# Patient Record
Sex: FEMALE | Race: WHITE | NOT HISPANIC OR LATINO | Employment: OTHER | URBAN - METROPOLITAN AREA
[De-identification: names, ages, dates, MRNs, and addresses within clinical notes are randomized per-mention and may not be internally consistent; named-entity substitution may affect disease eponyms.]

---

## 2017-10-05 ENCOUNTER — COMMUNICATION - HEALTHEAST (OUTPATIENT)
Dept: FAMILY MEDICINE | Facility: CLINIC | Age: 62
End: 2017-10-05

## 2017-10-23 ENCOUNTER — RECORDS - HEALTHEAST (OUTPATIENT)
Dept: ADMINISTRATIVE | Facility: OTHER | Age: 62
End: 2017-10-23

## 2017-11-07 ENCOUNTER — AMBULATORY - HEALTHEAST (OUTPATIENT)
Dept: FAMILY MEDICINE | Facility: CLINIC | Age: 62
End: 2017-11-07

## 2017-11-07 ENCOUNTER — COMMUNICATION - HEALTHEAST (OUTPATIENT)
Dept: FAMILY MEDICINE | Facility: CLINIC | Age: 62
End: 2017-11-07

## 2017-11-07 DIAGNOSIS — Z12.11 COLON CANCER SCREENING: ICD-10-CM

## 2017-11-11 ENCOUNTER — HOSPITAL ENCOUNTER (OUTPATIENT)
Dept: MAMMOGRAPHY | Facility: HOSPITAL | Age: 62
Discharge: HOME | End: 2017-11-11
Attending: FAMILY MEDICINE

## 2017-11-11 DIAGNOSIS — Z12.31 VISIT FOR SCREENING MAMMOGRAM: ICD-10-CM

## 2018-03-21 ENCOUNTER — COMMUNICATION - HEALTHEAST (OUTPATIENT)
Dept: FAMILY MEDICINE | Facility: CLINIC | Age: 63
End: 2018-03-21

## 2018-05-04 ENCOUNTER — OFFICE VISIT - HEALTHEAST (OUTPATIENT)
Dept: FAMILY MEDICINE | Facility: CLINIC | Age: 63
End: 2018-05-04

## 2018-05-04 DIAGNOSIS — E66.9 OBESITY (BMI 30-39.9): ICD-10-CM

## 2018-05-04 DIAGNOSIS — K21.9 GASTROESOPHAGEAL REFLUX DISEASE WITHOUT ESOPHAGITIS: ICD-10-CM

## 2018-05-04 DIAGNOSIS — Z00.00 ROUTINE GENERAL MEDICAL EXAMINATION AT A HEALTH CARE FACILITY: ICD-10-CM

## 2018-05-04 DIAGNOSIS — I10 BENIGN ESSENTIAL HYPERTENSION: ICD-10-CM

## 2018-05-04 DIAGNOSIS — E78.5 HYPERLIPIDEMIA, UNSPECIFIED HYPERLIPIDEMIA TYPE: ICD-10-CM

## 2018-05-04 DIAGNOSIS — E11.29 TYPE 2 DIABETES MELLITUS WITH MICROALBUMINURIA, WITHOUT LONG-TERM CURRENT USE OF INSULIN (H): ICD-10-CM

## 2018-05-04 DIAGNOSIS — R80.9 TYPE 2 DIABETES MELLITUS WITH MICROALBUMINURIA, WITHOUT LONG-TERM CURRENT USE OF INSULIN (H): ICD-10-CM

## 2018-05-04 DIAGNOSIS — Z12.11 COLON CANCER SCREENING: ICD-10-CM

## 2018-05-04 LAB
ALBUMIN SERPL-MCNC: 3.7 G/DL (ref 3.5–5)
ALP SERPL-CCNC: 114 U/L (ref 45–120)
ALT SERPL W P-5'-P-CCNC: 36 U/L (ref 0–45)
ANION GAP SERPL CALCULATED.3IONS-SCNC: 13 MMOL/L (ref 5–18)
AST SERPL W P-5'-P-CCNC: 30 U/L (ref 0–40)
BILIRUB SERPL-MCNC: 0.6 MG/DL (ref 0–1)
BUN SERPL-MCNC: 12 MG/DL (ref 8–22)
CALCIUM SERPL-MCNC: 9.9 MG/DL (ref 8.5–10.5)
CHLORIDE BLD-SCNC: 99 MMOL/L (ref 98–107)
CHOLEST SERPL-MCNC: 294 MG/DL
CO2 SERPL-SCNC: 27 MMOL/L (ref 22–31)
CREAT SERPL-MCNC: 0.81 MG/DL (ref 0.6–1.1)
ERYTHROCYTE [DISTWIDTH] IN BLOOD BY AUTOMATED COUNT: 10.9 % (ref 11–14.5)
FASTING STATUS PATIENT QL REPORTED: YES
GFR SERPL CREATININE-BSD FRML MDRD: >60 ML/MIN/1.73M2
GLUCOSE BLD-MCNC: 262 MG/DL (ref 70–125)
HBA1C MFR BLD: 10.8 % (ref 3.5–6)
HCT VFR BLD AUTO: 44.8 % (ref 35–47)
HDLC SERPL-MCNC: 50 MG/DL
HGB BLD-MCNC: 15.3 G/DL (ref 12–16)
LDLC SERPL CALC-MCNC: 207 MG/DL
MCH RBC QN AUTO: 32.9 PG (ref 27–34)
MCHC RBC AUTO-ENTMCNC: 34.2 G/DL (ref 32–36)
MCV RBC AUTO: 96 FL (ref 80–100)
PLATELET # BLD AUTO: 205 THOU/UL (ref 140–440)
PMV BLD AUTO: 7.6 FL (ref 7–10)
POTASSIUM BLD-SCNC: 4.5 MMOL/L (ref 3.5–5)
PROT SERPL-MCNC: 7.1 G/DL (ref 6–8)
RBC # BLD AUTO: 4.65 MILL/UL (ref 3.8–5.4)
SODIUM SERPL-SCNC: 139 MMOL/L (ref 136–145)
TRIGL SERPL-MCNC: 187 MG/DL
WBC: 7.9 THOU/UL (ref 4–11)

## 2018-05-04 ASSESSMENT — MIFFLIN-ST. JEOR: SCORE: 1454.86

## 2018-06-19 ENCOUNTER — OFFICE VISIT - HEALTHEAST (OUTPATIENT)
Dept: FAMILY MEDICINE | Facility: CLINIC | Age: 63
End: 2018-06-19

## 2018-06-19 DIAGNOSIS — H81.02 MENIERE'S DISEASE, LEFT: ICD-10-CM

## 2018-06-19 DIAGNOSIS — H66.002 ACUTE SUPPURATIVE OTITIS MEDIA OF LEFT EAR WITHOUT SPONTANEOUS RUPTURE OF TYMPANIC MEMBRANE, RECURRENCE NOT SPECIFIED: ICD-10-CM

## 2018-06-19 DIAGNOSIS — H91.92 HEARING LOSS OF LEFT EAR, UNSPECIFIED HEARING LOSS TYPE: ICD-10-CM

## 2018-06-19 ASSESSMENT — MIFFLIN-ST. JEOR: SCORE: 1430.82

## 2018-06-25 ENCOUNTER — OFFICE VISIT - HEALTHEAST (OUTPATIENT)
Dept: FAMILY MEDICINE | Facility: CLINIC | Age: 63
End: 2018-06-25

## 2018-06-25 DIAGNOSIS — I10 BENIGN ESSENTIAL HYPERTENSION: ICD-10-CM

## 2018-06-25 DIAGNOSIS — H81.09 MENIERE'S DISEASE, UNSPECIFIED LATERALITY: ICD-10-CM

## 2018-06-25 RX ORDER — MECLIZINE HYDROCHLORIDE 25 MG/1
25 TABLET ORAL 3 TIMES DAILY PRN
Qty: 30 TABLET | Refills: 1 | Status: SHIPPED | OUTPATIENT
Start: 2018-06-25 | End: 2022-01-27

## 2018-06-25 ASSESSMENT — MIFFLIN-ST. JEOR: SCORE: 1427.19

## 2018-07-09 ENCOUNTER — AMBULATORY - HEALTHEAST (OUTPATIENT)
Dept: LAB | Facility: CLINIC | Age: 63
End: 2018-07-09

## 2018-07-09 DIAGNOSIS — H81.09 MENIERE'S DISEASE, UNSPECIFIED LATERALITY: ICD-10-CM

## 2018-07-09 DIAGNOSIS — I10 BENIGN ESSENTIAL HYPERTENSION: ICD-10-CM

## 2018-07-09 LAB
ANION GAP SERPL CALCULATED.3IONS-SCNC: 14 MMOL/L (ref 5–18)
CHLORIDE BLD-SCNC: 98 MMOL/L (ref 98–107)
CO2 SERPL-SCNC: 26 MMOL/L (ref 22–31)
CREAT SERPL-MCNC: 0.92 MG/DL (ref 0.6–1.1)
GFR SERPL CREATININE-BSD FRML MDRD: >60 ML/MIN/1.73M2
POTASSIUM BLD-SCNC: 4.9 MMOL/L (ref 3.5–5)
SODIUM SERPL-SCNC: 138 MMOL/L (ref 136–145)

## 2018-07-10 ENCOUNTER — OFFICE VISIT - HEALTHEAST (OUTPATIENT)
Dept: FAMILY MEDICINE | Facility: CLINIC | Age: 63
End: 2018-07-10

## 2018-07-10 DIAGNOSIS — Z23 NEED FOR SHINGLES VACCINE: ICD-10-CM

## 2018-07-10 DIAGNOSIS — R42 VERTIGO: ICD-10-CM

## 2018-07-10 DIAGNOSIS — H81.09 MENIERE'S DISEASE, UNSPECIFIED LATERALITY: ICD-10-CM

## 2018-07-10 ASSESSMENT — MIFFLIN-ST. JEOR: SCORE: 1428.55

## 2018-07-25 ENCOUNTER — OFFICE VISIT - HEALTHEAST (OUTPATIENT)
Dept: AUDIOLOGY | Facility: CLINIC | Age: 63
End: 2018-07-25

## 2018-07-25 ENCOUNTER — OFFICE VISIT - HEALTHEAST (OUTPATIENT)
Dept: OTOLARYNGOLOGY | Facility: CLINIC | Age: 63
End: 2018-07-25

## 2018-07-25 DIAGNOSIS — R42 VERTIGO: ICD-10-CM

## 2018-07-25 DIAGNOSIS — H93.12 TINNITUS OF LEFT EAR: ICD-10-CM

## 2018-07-25 DIAGNOSIS — H91.22 SUDDEN LEFT HEARING LOSS: ICD-10-CM

## 2018-07-25 DIAGNOSIS — R42 DIZZINESS: ICD-10-CM

## 2018-07-25 DIAGNOSIS — H90.3 SENSORINEURAL HEARING LOSS, BILATERAL: ICD-10-CM

## 2018-07-25 RX ORDER — TIMOLOL MALEATE 5 MG/ML
SOLUTION/ DROPS OPHTHALMIC
Refills: 3 | Status: SHIPPED | COMMUNITY
Start: 2018-05-07

## 2018-07-31 ENCOUNTER — OFFICE VISIT - HEALTHEAST (OUTPATIENT)
Dept: AUDIOLOGY | Facility: CLINIC | Age: 63
End: 2018-07-31

## 2018-07-31 DIAGNOSIS — H90.3 SENSORINEURAL HEARING LOSS, BILATERAL: ICD-10-CM

## 2018-07-31 DIAGNOSIS — H93.12 TINNITUS OF LEFT EAR: ICD-10-CM

## 2018-08-01 ENCOUNTER — OFFICE VISIT - HEALTHEAST (OUTPATIENT)
Dept: OTOLARYNGOLOGY | Facility: CLINIC | Age: 63
End: 2018-08-01

## 2018-08-01 DIAGNOSIS — H91.22 SUDDEN HEARING LOSS, LEFT: ICD-10-CM

## 2018-08-09 ENCOUNTER — HOSPITAL ENCOUNTER (OUTPATIENT)
Dept: MRI IMAGING | Facility: HOSPITAL | Age: 63
Discharge: HOME | End: 2018-08-09
Attending: OTOLARYNGOLOGY

## 2018-08-09 DIAGNOSIS — H91.22 SUDDEN HEARING LOSS, LEFT: ICD-10-CM

## 2018-08-15 ENCOUNTER — OFFICE VISIT - HEALTHEAST (OUTPATIENT)
Dept: OTOLARYNGOLOGY | Facility: CLINIC | Age: 63
End: 2018-08-15

## 2018-08-15 DIAGNOSIS — H91.22 SUDDEN HEARING LOSS, LEFT: ICD-10-CM

## 2018-08-30 ENCOUNTER — RECORDS - HEALTHEAST (OUTPATIENT)
Dept: ADMINISTRATIVE | Facility: OTHER | Age: 63
End: 2018-08-30

## 2018-09-05 ENCOUNTER — OFFICE VISIT - HEALTHEAST (OUTPATIENT)
Dept: AUDIOLOGY | Facility: CLINIC | Age: 63
End: 2018-09-05

## 2018-09-05 ENCOUNTER — OFFICE VISIT - HEALTHEAST (OUTPATIENT)
Dept: OTOLARYNGOLOGY | Facility: CLINIC | Age: 63
End: 2018-09-05

## 2018-09-05 DIAGNOSIS — C41.0 CHORDOMA OF CLIVUS (H): ICD-10-CM

## 2018-09-05 DIAGNOSIS — H91.22 SUDDEN HEARING LOSS, LEFT: ICD-10-CM

## 2018-09-05 DIAGNOSIS — H91.22 SUDDEN LEFT HEARING LOSS: ICD-10-CM

## 2018-09-11 ENCOUNTER — COMMUNICATION - HEALTHEAST (OUTPATIENT)
Dept: FAMILY MEDICINE | Facility: CLINIC | Age: 63
End: 2018-09-11

## 2018-09-11 DIAGNOSIS — I10 BENIGN ESSENTIAL HYPERTENSION: ICD-10-CM

## 2018-09-14 ENCOUNTER — COMMUNICATION - HEALTHEAST (OUTPATIENT)
Dept: FAMILY MEDICINE | Facility: CLINIC | Age: 63
End: 2018-09-14

## 2018-09-14 DIAGNOSIS — Z12.11 SCREEN FOR COLON CANCER: ICD-10-CM

## 2018-10-04 ENCOUNTER — COMMUNICATION - HEALTHEAST (OUTPATIENT)
Dept: ADMINISTRATIVE | Facility: CLINIC | Age: 63
End: 2018-10-04

## 2018-10-09 ENCOUNTER — COMMUNICATION - HEALTHEAST (OUTPATIENT)
Dept: FAMILY MEDICINE | Facility: CLINIC | Age: 63
End: 2018-10-09

## 2018-11-02 ENCOUNTER — RECORDS - HEALTHEAST (OUTPATIENT)
Dept: ADMINISTRATIVE | Facility: OTHER | Age: 63
End: 2018-11-02

## 2018-11-06 ENCOUNTER — RECORDS - HEALTHEAST (OUTPATIENT)
Dept: ADMINISTRATIVE | Facility: OTHER | Age: 63
End: 2018-11-06

## 2018-11-16 ENCOUNTER — COMMUNICATION - HEALTHEAST (OUTPATIENT)
Dept: NURSING | Facility: CLINIC | Age: 63
End: 2018-11-16

## 2018-11-20 ENCOUNTER — AMBULATORY - HEALTHEAST (OUTPATIENT)
Dept: NURSING | Facility: CLINIC | Age: 63
End: 2018-11-20

## 2019-04-02 ENCOUNTER — RECORDS - HEALTHEAST (OUTPATIENT)
Dept: HEALTH INFORMATION MANAGEMENT | Facility: CLINIC | Age: 64
End: 2019-04-02

## 2019-05-21 ENCOUNTER — COMMUNICATION - HEALTHEAST (OUTPATIENT)
Dept: FAMILY MEDICINE | Facility: CLINIC | Age: 64
End: 2019-05-21

## 2019-05-21 DIAGNOSIS — I10 BENIGN ESSENTIAL HYPERTENSION: ICD-10-CM

## 2019-05-21 DIAGNOSIS — R80.9 TYPE 2 DIABETES MELLITUS WITH MICROALBUMINURIA, WITHOUT LONG-TERM CURRENT USE OF INSULIN (H): ICD-10-CM

## 2019-05-21 DIAGNOSIS — E11.29 TYPE 2 DIABETES MELLITUS WITH MICROALBUMINURIA, WITHOUT LONG-TERM CURRENT USE OF INSULIN (H): ICD-10-CM

## 2019-05-21 DIAGNOSIS — K21.9 GASTROESOPHAGEAL REFLUX DISEASE WITHOUT ESOPHAGITIS: ICD-10-CM

## 2019-05-23 ENCOUNTER — COMMUNICATION - HEALTHEAST (OUTPATIENT)
Dept: FAMILY MEDICINE | Facility: CLINIC | Age: 64
End: 2019-05-23

## 2019-05-23 DIAGNOSIS — I10 BENIGN ESSENTIAL HYPERTENSION: ICD-10-CM

## 2019-08-13 ENCOUNTER — COMMUNICATION - HEALTHEAST (OUTPATIENT)
Dept: NURSING | Facility: CLINIC | Age: 64
End: 2019-08-13

## 2019-08-13 DIAGNOSIS — K21.9 GASTROESOPHAGEAL REFLUX DISEASE WITHOUT ESOPHAGITIS: ICD-10-CM

## 2019-08-13 DIAGNOSIS — I10 BENIGN ESSENTIAL HYPERTENSION: ICD-10-CM

## 2019-09-09 ENCOUNTER — COMMUNICATION - HEALTHEAST (OUTPATIENT)
Dept: FAMILY MEDICINE | Facility: CLINIC | Age: 64
End: 2019-09-09

## 2019-09-09 DIAGNOSIS — I10 BENIGN ESSENTIAL HYPERTENSION: ICD-10-CM

## 2019-12-06 ENCOUNTER — RECORDS - HEALTHEAST (OUTPATIENT)
Dept: ADMINISTRATIVE | Facility: OTHER | Age: 64
End: 2019-12-06

## 2020-04-22 ENCOUNTER — COMMUNICATION - HEALTHEAST (OUTPATIENT)
Dept: FAMILY MEDICINE | Facility: CLINIC | Age: 65
End: 2020-04-22

## 2020-04-22 ENCOUNTER — COMMUNICATION - HEALTHEAST (OUTPATIENT)
Dept: NURSING | Facility: CLINIC | Age: 65
End: 2020-04-22

## 2020-04-22 DIAGNOSIS — K21.9 GASTROESOPHAGEAL REFLUX DISEASE WITHOUT ESOPHAGITIS: ICD-10-CM

## 2020-04-22 DIAGNOSIS — I10 BENIGN ESSENTIAL HYPERTENSION: ICD-10-CM

## 2020-04-24 ENCOUNTER — COMMUNICATION - HEALTHEAST (OUTPATIENT)
Dept: FAMILY MEDICINE | Facility: CLINIC | Age: 65
End: 2020-04-24

## 2020-04-27 ENCOUNTER — OFFICE VISIT - HEALTHEAST (OUTPATIENT)
Dept: FAMILY MEDICINE | Facility: CLINIC | Age: 65
End: 2020-04-27

## 2020-04-27 DIAGNOSIS — I10 BENIGN ESSENTIAL HYPERTENSION: ICD-10-CM

## 2020-04-27 DIAGNOSIS — R30.0 DYSURIA: ICD-10-CM

## 2020-04-27 DIAGNOSIS — E11.29 TYPE 2 DIABETES MELLITUS WITH MICROALBUMINURIA, WITHOUT LONG-TERM CURRENT USE OF INSULIN (H): ICD-10-CM

## 2020-04-27 DIAGNOSIS — E78.5 HYPERLIPIDEMIA, UNSPECIFIED HYPERLIPIDEMIA TYPE: ICD-10-CM

## 2020-04-27 DIAGNOSIS — R80.9 TYPE 2 DIABETES MELLITUS WITH MICROALBUMINURIA, WITHOUT LONG-TERM CURRENT USE OF INSULIN (H): ICD-10-CM

## 2020-04-27 ASSESSMENT — PATIENT HEALTH QUESTIONNAIRE - PHQ9: SUM OF ALL RESPONSES TO PHQ QUESTIONS 1-9: 0

## 2020-06-09 ENCOUNTER — AMBULATORY - HEALTHEAST (OUTPATIENT)
Dept: LAB | Facility: CLINIC | Age: 65
End: 2020-06-09

## 2020-06-09 ENCOUNTER — COMMUNICATION - HEALTHEAST (OUTPATIENT)
Dept: FAMILY MEDICINE | Facility: CLINIC | Age: 65
End: 2020-06-09

## 2020-06-09 DIAGNOSIS — R80.9 TYPE 2 DIABETES MELLITUS WITH MICROALBUMINURIA, WITHOUT LONG-TERM CURRENT USE OF INSULIN (H): ICD-10-CM

## 2020-06-09 DIAGNOSIS — E11.29 TYPE 2 DIABETES MELLITUS WITH MICROALBUMINURIA, WITHOUT LONG-TERM CURRENT USE OF INSULIN (H): ICD-10-CM

## 2020-06-09 DIAGNOSIS — I10 BENIGN ESSENTIAL HYPERTENSION: ICD-10-CM

## 2020-06-09 DIAGNOSIS — E78.5 HYPERLIPIDEMIA, UNSPECIFIED HYPERLIPIDEMIA TYPE: ICD-10-CM

## 2020-06-09 LAB
ALBUMIN SERPL-MCNC: 3.5 G/DL (ref 3.5–5)
ALP SERPL-CCNC: 91 U/L (ref 45–120)
ALT SERPL W P-5'-P-CCNC: 22 U/L (ref 0–45)
ANION GAP SERPL CALCULATED.3IONS-SCNC: 10 MMOL/L (ref 5–18)
AST SERPL W P-5'-P-CCNC: 20 U/L (ref 0–40)
BILIRUB SERPL-MCNC: 0.6 MG/DL (ref 0–1)
BUN SERPL-MCNC: 11 MG/DL (ref 8–22)
CALCIUM SERPL-MCNC: 9.4 MG/DL (ref 8.5–10.5)
CHLORIDE BLD-SCNC: 104 MMOL/L (ref 98–107)
CHOLEST SERPL-MCNC: 241 MG/DL
CO2 SERPL-SCNC: 27 MMOL/L (ref 22–31)
CREAT SERPL-MCNC: 0.84 MG/DL (ref 0.6–1.1)
CREAT UR-MCNC: 45.7 MG/DL
ERYTHROCYTE [DISTWIDTH] IN BLOOD BY AUTOMATED COUNT: 12.1 % (ref 11–14.5)
FASTING STATUS PATIENT QL REPORTED: YES
GFR SERPL CREATININE-BSD FRML MDRD: >60 ML/MIN/1.73M2
GLUCOSE BLD-MCNC: 182 MG/DL (ref 70–125)
HBA1C MFR BLD: 7 % (ref 3.5–6)
HCT VFR BLD AUTO: 47.3 % (ref 35–47)
HDLC SERPL-MCNC: 46 MG/DL
HGB BLD-MCNC: 16.4 G/DL (ref 12–16)
LDLC SERPL CALC-MCNC: 164 MG/DL
MCH RBC QN AUTO: 33.5 PG (ref 27–34)
MCHC RBC AUTO-ENTMCNC: 34.7 G/DL (ref 32–36)
MCV RBC AUTO: 96 FL (ref 80–100)
MICROALBUMIN UR-MCNC: 2.4 MG/DL (ref 0–1.99)
MICROALBUMIN/CREAT UR: 52.5 MG/G
PLATELET # BLD AUTO: 172 THOU/UL (ref 140–440)
PMV BLD AUTO: 8.7 FL (ref 7–10)
POTASSIUM BLD-SCNC: 4.2 MMOL/L (ref 3.5–5)
PROT SERPL-MCNC: 6.7 G/DL (ref 6–8)
RBC # BLD AUTO: 4.9 MILL/UL (ref 3.8–5.4)
SODIUM SERPL-SCNC: 141 MMOL/L (ref 136–145)
TRIGL SERPL-MCNC: 154 MG/DL
WBC: 6.6 THOU/UL (ref 4–11)

## 2020-06-10 ENCOUNTER — COMMUNICATION - HEALTHEAST (OUTPATIENT)
Dept: FAMILY MEDICINE | Facility: CLINIC | Age: 65
End: 2020-06-10

## 2020-06-10 DIAGNOSIS — K21.9 GASTROESOPHAGEAL REFLUX DISEASE WITHOUT ESOPHAGITIS: ICD-10-CM

## 2020-06-10 DIAGNOSIS — R80.9 TYPE 2 DIABETES MELLITUS WITH MICROALBUMINURIA, WITHOUT LONG-TERM CURRENT USE OF INSULIN (H): ICD-10-CM

## 2020-06-10 DIAGNOSIS — I10 BENIGN ESSENTIAL HYPERTENSION: ICD-10-CM

## 2020-06-10 DIAGNOSIS — E78.5 HYPERLIPIDEMIA, UNSPECIFIED HYPERLIPIDEMIA TYPE: ICD-10-CM

## 2020-06-10 DIAGNOSIS — E11.29 TYPE 2 DIABETES MELLITUS WITH MICROALBUMINURIA, WITHOUT LONG-TERM CURRENT USE OF INSULIN (H): ICD-10-CM

## 2020-08-21 ENCOUNTER — RECORDS - HEALTHEAST (OUTPATIENT)
Dept: ADMINISTRATIVE | Facility: OTHER | Age: 65
End: 2020-08-21

## 2020-08-21 LAB — RETINOPATHY: NEGATIVE

## 2020-09-01 ENCOUNTER — RECORDS - HEALTHEAST (OUTPATIENT)
Dept: HEALTH INFORMATION MANAGEMENT | Facility: CLINIC | Age: 65
End: 2020-09-01

## 2021-03-05 ENCOUNTER — RECORDS - HEALTHEAST (OUTPATIENT)
Dept: ADMINISTRATIVE | Facility: OTHER | Age: 66
End: 2021-03-05

## 2021-03-05 LAB — RETINOPATHY: NEGATIVE

## 2021-03-09 ENCOUNTER — RECORDS - HEALTHEAST (OUTPATIENT)
Dept: HEALTH INFORMATION MANAGEMENT | Facility: CLINIC | Age: 66
End: 2021-03-09

## 2021-03-26 ENCOUNTER — AMBULATORY - HEALTHEAST (OUTPATIENT)
Dept: NURSING | Facility: CLINIC | Age: 66
End: 2021-03-26

## 2021-04-16 ENCOUNTER — AMBULATORY - HEALTHEAST (OUTPATIENT)
Dept: NURSING | Facility: CLINIC | Age: 66
End: 2021-04-16

## 2021-05-24 ENCOUNTER — RECORDS - HEALTHEAST (OUTPATIENT)
Dept: ADMINISTRATIVE | Facility: CLINIC | Age: 66
End: 2021-05-24

## 2021-05-25 ENCOUNTER — RECORDS - HEALTHEAST (OUTPATIENT)
Dept: ADMINISTRATIVE | Facility: CLINIC | Age: 66
End: 2021-05-25

## 2021-05-26 ENCOUNTER — RECORDS - HEALTHEAST (OUTPATIENT)
Dept: ADMINISTRATIVE | Facility: CLINIC | Age: 66
End: 2021-05-26

## 2021-05-27 ENCOUNTER — RECORDS - HEALTHEAST (OUTPATIENT)
Dept: ADMINISTRATIVE | Facility: CLINIC | Age: 66
End: 2021-05-27

## 2021-05-27 ASSESSMENT — PATIENT HEALTH QUESTIONNAIRE - PHQ9: SUM OF ALL RESPONSES TO PHQ QUESTIONS 1-9: 0

## 2021-05-28 ENCOUNTER — RECORDS - HEALTHEAST (OUTPATIENT)
Dept: ADMINISTRATIVE | Facility: CLINIC | Age: 66
End: 2021-05-28

## 2021-05-29 NOTE — TELEPHONE ENCOUNTER
Refill Approved    Rx renewed per Medication Renewal Policy. Medication was last renewed on 5/4/18.    Sheila Young, [facility] Triage/Med Refill 5/22/2019     Requested Prescriptions   Pending Prescriptions Disp Refills     metFORMIN (GLUCOPHAGE) 500 MG tablet [Pharmacy Med Name: METFORMIN HCL TABS 500MG] 180 tablet 3     Sig: TAKE 1 TABLET TWICE A DAY WITH MEALS       Metformin Refill Protocol Failed - 5/21/2019  8:55 AM        Failed - LFT or AST or ALT in last 12 months     Albumin   Date Value Ref Range Status   05/04/2018 3.7 3.5 - 5.0 g/dL Final     Bilirubin, Total   Date Value Ref Range Status   05/04/2018 0.6 0.0 - 1.0 mg/dL Final     Alkaline Phosphatase   Date Value Ref Range Status   05/04/2018 114 45 - 120 U/L Final     AST   Date Value Ref Range Status   05/04/2018 30 0 - 40 U/L Final     ALT   Date Value Ref Range Status   05/04/2018 36 0 - 45 U/L Final     Protein, Total   Date Value Ref Range Status   05/04/2018 7.1 6.0 - 8.0 g/dL Final                Failed - Visit with PCP or prescribing provider visit in last 6 months or next 3 months     Last office visit with prescriber/PCP: Visit date not found OR same dept: 7/10/2018 Yumi Montilla MD OR same specialty: 7/10/2018 Yumi Montilla MD Last physical: Visit date not found Last MTM visit: Visit date not found         Next appt within 3 mo: Visit date not found  Next physical within 3 mo: Visit date not found  Prescriber OR PCP: Cora Cat MD  Last diagnosis associated with med order: 1. Benign essential hypertension  - lisinopril (PRINIVIL,ZESTRIL) 20 MG tablet [Pharmacy Med Name: LISINOPRIL TABS 20MG]; TAKE 1 TABLET DAILY  Dispense: 90 tablet; Refill: 3     If protocol passes may refill for 12 months if within 3 months of last provider visit (or a total of 15 months).           Failed - A1C in last 6 months     Hemoglobin A1c   Date Value Ref Range Status   05/04/2018 10.8 (H) 3.5 - 6.0 % Final               Failed -  Microalbumin in last year      Microalbumin, Random Urine   Date Value Ref Range Status   11/11/2016 22.05 (H) 0.00 - 1.99 mg/dL Final                  Passed - Blood pressure in last 12 months     BP Readings from Last 1 Encounters:   07/10/18 128/68             Passed - GFR or Serum Creatinine in last 6 months     GFR MDRD Non Af Amer   Date Value Ref Range Status   07/09/2018 >60 >60 mL/min/1.73m2 Final     GFR MDRD Af Amer   Date Value Ref Range Status   07/09/2018 >60 >60 mL/min/1.73m2 Final             lisinopril (PRINIVIL,ZESTRIL) 20 MG tablet [Pharmacy Med Name: LISINOPRIL TABS 20MG] 90 tablet 3     Sig: TAKE 1 TABLET DAILY       Ace Inhibitors Refill Protocol Passed - 5/21/2019  8:55 AM        Passed - PCP or prescribing provider visit in past 12 months       Last office visit with prescriber/PCP: 9/25/2015 Cora Cat MD OR same dept: 7/10/2018 Yumi Montilla MD OR same specialty: 7/10/2018 Yumi Montilla MD  Last physical: 5/4/2018 Last MTM visit: Visit date not found   Next visit within 3 mo: Visit date not found  Next physical within 3 mo: Visit date not found  Prescriber OR PCP: Cora Cat MD  Last diagnosis associated with med order: 1. Benign essential hypertension  - lisinopril (PRINIVIL,ZESTRIL) 20 MG tablet [Pharmacy Med Name: LISINOPRIL TABS 20MG]; TAKE 1 TABLET DAILY  Dispense: 90 tablet; Refill: 3    If protocol passes may refill for 12 months if within 3 months of last provider visit (or a total of 15 months).             Passed - Serum Potassium in past 12 months     Lab Results   Component Value Date    Potassium 4.9 07/09/2018             Passed - Blood pressure filed in past 12 months     BP Readings from Last 1 Encounters:   07/10/18 128/68             Passed - Serum Creatinine in past 12 months     Creatinine   Date Value Ref Range Status   07/09/2018 0.92 0.60 - 1.10 mg/dL Final             omeprazole (PRILOSEC) 20 MG capsule [Pharmacy Med Name: OMEPRAZOLE DR  CAPS 20MG] 90 capsule 3     Sig: TAKE 1 CAPSULE DAILY BEFORE BREAKFAST       GI Medications Refill Protocol Passed - 5/21/2019  8:55 AM        Passed - PCP or prescribing provider visit in last 12 or next 3 months.     Last office visit with prescriber/PCP: 9/25/2015 Cora Cat MD OR same dept: 7/10/2018 Yumi Montilla MD OR same specialty: 7/10/2018 Yumi Montilla MD  Last physical: 5/4/2018 Last MTM visit: Visit date not found   Next visit within 3 mo: Visit date not found  Next physical within 3 mo: Visit date not found  Prescriber OR PCP: Cora Cat MD  Last diagnosis associated with med order: 1. Benign essential hypertension  - lisinopril (PRINIVIL,ZESTRIL) 20 MG tablet [Pharmacy Med Name: LISINOPRIL TABS 20MG]; TAKE 1 TABLET DAILY  Dispense: 90 tablet; Refill: 3    If protocol passes may refill for 12 months if within 3 months of last provider visit (or a total of 15 months).

## 2021-05-29 NOTE — TELEPHONE ENCOUNTER
Patient has switched to [facility]         Refill Request  Did you contact pharmacy: Yes  Medication name:   Requested Prescriptions     Pending Prescriptions Disp Refills     hydroCHLOROthiazide (HYDRODIURIL) 25 MG tablet 90 tablet 3     Sig: Take 1 tablet (25 mg total) by mouth daily.     Who prescribed the medication: Cora Cat MD  Pharmacy Name and Location: [facility]   Is patient out of medication: unknown  Patient notified refills processed in 72 hours:  no  Okay to leave a detailed message: no

## 2021-05-29 NOTE — TELEPHONE ENCOUNTER
RN cannot approve Refill Request    RN can NOT refill this medication Protocol failed and NO refill given.         Sheila Young, [facility] Triage/Med Refill 5/22/2019    Requested Prescriptions   Pending Prescriptions Disp Refills     metFORMIN (GLUCOPHAGE) 500 MG tablet [Pharmacy Med Name: METFORMIN HCL TABS 500MG] 180 tablet 3     Sig: TAKE 1 TABLET TWICE A DAY WITH MEALS       Metformin Refill Protocol Failed - 5/21/2019  8:55 AM        Failed - LFT or AST or ALT in last 12 months     Albumin   Date Value Ref Range Status   05/04/2018 3.7 3.5 - 5.0 g/dL Final     Bilirubin, Total   Date Value Ref Range Status   05/04/2018 0.6 0.0 - 1.0 mg/dL Final     Alkaline Phosphatase   Date Value Ref Range Status   05/04/2018 114 45 - 120 U/L Final     AST   Date Value Ref Range Status   05/04/2018 30 0 - 40 U/L Final     ALT   Date Value Ref Range Status   05/04/2018 36 0 - 45 U/L Final     Protein, Total   Date Value Ref Range Status   05/04/2018 7.1 6.0 - 8.0 g/dL Final                Failed - Visit with PCP or prescribing provider visit in last 6 months or next 3 months     Last office visit with prescriber/PCP: Visit date not found OR same dept: 7/10/2018 Yumi Montilla MD OR same specialty: 7/10/2018 Yumi Montilla MD Last physical: Visit date not found Last MTM visit: Visit date not found         Next appt within 3 mo: Visit date not found  Next physical within 3 mo: Visit date not found  Prescriber OR PCP: Cora Cat MD  Last diagnosis associated with med order: 1. Benign essential hypertension  - lisinopril (PRINIVIL,ZESTRIL) 20 MG tablet; TAKE 1 TABLET DAILY  Dispense: 90 tablet; Refill: 0    2. Gastroesophageal reflux disease without esophagitis  - omeprazole (PRILOSEC) 20 MG capsule; TAKE 1 CAPSULE DAILY BEFORE BREAKFAST  Dispense: 90 capsule; Refill: 0     If protocol passes may refill for 12 months if within 3 months of last provider visit (or a total of 15 months).           Failed - A1C  in last 6 months     Hemoglobin A1c   Date Value Ref Range Status   05/04/2018 10.8 (H) 3.5 - 6.0 % Final               Failed - Microalbumin in last year      Microalbumin, Random Urine   Date Value Ref Range Status   11/11/2016 22.05 (H) 0.00 - 1.99 mg/dL Final                  Passed - Blood pressure in last 12 months     BP Readings from Last 1 Encounters:   07/10/18 128/68             Passed - GFR or Serum Creatinine in last 6 months     GFR MDRD Non Af Amer   Date Value Ref Range Status   07/09/2018 >60 >60 mL/min/1.73m2 Final     GFR MDRD Af Amer   Date Value Ref Range Status   07/09/2018 >60 >60 mL/min/1.73m2 Final           Signed Prescriptions Disp Refills    lisinopril (PRINIVIL,ZESTRIL) 20 MG tablet 90 tablet 0     Sig: TAKE 1 TABLET DAILY       Ace Inhibitors Refill Protocol Passed - 5/21/2019  8:55 AM        Passed - PCP or prescribing provider visit in past 12 months       Last office visit with prescriber/PCP: 9/25/2015 Cora Cat MD OR same dept: 7/10/2018 Yumi Montilla MD OR same specialty: 7/10/2018 Yumi Montilla MD  Last physical: 5/4/2018 Last MTM visit: Visit date not found   Next visit within 3 mo: Visit date not found  Next physical within 3 mo: Visit date not found  Prescriber OR PCP: Cora Cat MD  Last diagnosis associated with med order: 1. Benign essential hypertension  - lisinopril (PRINIVIL,ZESTRIL) 20 MG tablet; TAKE 1 TABLET DAILY  Dispense: 90 tablet; Refill: 0    2. Gastroesophageal reflux disease without esophagitis  - omeprazole (PRILOSEC) 20 MG capsule; TAKE 1 CAPSULE DAILY BEFORE BREAKFAST  Dispense: 90 capsule; Refill: 0    If protocol passes may refill for 12 months if within 3 months of last provider visit (or a total of 15 months).             Passed - Serum Potassium in past 12 months     Lab Results   Component Value Date    Potassium 4.9 07/09/2018             Passed - Blood pressure filed in past 12 months     BP Readings from Last 1  Encounters:   07/10/18 128/68             Passed - Serum Creatinine in past 12 months     Creatinine   Date Value Ref Range Status   07/09/2018 0.92 0.60 - 1.10 mg/dL Final            omeprazole (PRILOSEC) 20 MG capsule 90 capsule 0     Sig: TAKE 1 CAPSULE DAILY BEFORE BREAKFAST       GI Medications Refill Protocol Passed - 5/21/2019  8:55 AM        Passed - PCP or prescribing provider visit in last 12 or next 3 months.     Last office visit with prescriber/PCP: 9/25/2015 Cora Cat MD OR same dept: 7/10/2018 Yumi Montilla MD OR same specialty: 7/10/2018 Yumi Montilla MD  Last physical: 5/4/2018 Last MTM visit: Visit date not found   Next visit within 3 mo: Visit date not found  Next physical within 3 mo: Visit date not found  Prescriber OR PCP: Cora Cat MD  Last diagnosis associated with med order: 1. Benign essential hypertension  - lisinopril (PRINIVIL,ZESTRIL) 20 MG tablet; TAKE 1 TABLET DAILY  Dispense: 90 tablet; Refill: 0    2. Gastroesophageal reflux disease without esophagitis  - omeprazole (PRILOSEC) 20 MG capsule; TAKE 1 CAPSULE DAILY BEFORE BREAKFAST  Dispense: 90 capsule; Refill: 0    If protocol passes may refill for 12 months if within 3 months of last provider visit (or a total of 15 months).

## 2021-05-31 NOTE — TELEPHONE ENCOUNTER
RN cannot approve Refill Request    RN can NOT refill this medication Protocol failed and NO refill given.       Sheila Young, [facility] Triage/Med Refill 8/14/2019    Requested Prescriptions   Pending Prescriptions Disp Refills     omeprazole (PRILOSEC) 20 MG capsule [Pharmacy Med Name: OMEPRAZOLE DR CAPS 20MG] 90 capsule 0     Sig: TAKE 1 CAPSULE DAILY BEFORE BREAKFAST       GI Medications Refill Protocol Failed - 8/13/2019 11:29 AM        Failed - PCP or prescribing provider visit in last 12 or next 3 months.     Last office visit with prescriber/PCP: 9/25/2015 Cora Cat MD OR same dept: Visit date not found OR same specialty: Visit date not found  Last physical: 5/4/2018 Last MTM visit: Visit date not found   Next visit within 3 mo: Visit date not found  Next physical within 3 mo: Visit date not found  Prescriber OR PCP: Cora Cat MD  Last diagnosis associated with med order: 1. Benign essential hypertension  - lisinopril (PRINIVIL,ZESTRIL) 20 MG tablet [Pharmacy Med Name: LISINOPRIL TABS 20MG]; TAKE 1 TABLET DAILY  Dispense: 90 tablet; Refill: 0    If protocol passes may refill for 12 months if within 3 months of last provider visit (or a total of 15 months).             lisinopril (PRINIVIL,ZESTRIL) 20 MG tablet [Pharmacy Med Name: LISINOPRIL TABS 20MG] 90 tablet 0     Sig: TAKE 1 TABLET DAILY       Ace Inhibitors Refill Protocol Failed - 8/13/2019 11:29 AM        Failed - PCP or prescribing provider visit in past 12 months       Last office visit with prescriber/PCP: 9/25/2015 Cora Cat MD OR [facility] dept: Visit date not found OR same specialty: Visit date not found  Last physical: 5/4/2018 Last MTM visit: Visit date not found   Next visit within 3 mo: Visit date not found  Next physical within 3 mo: Visit date not found  Prescriber OR PCP: Cora Cat MD  Last diagnosis associated with med order: 1. Benign essential hypertension  - lisinopril (PRINIVIL,ZESTRIL)  20 MG tablet [Pharmacy Med Name: LISINOPRIL TABS 20MG]; TAKE 1 TABLET DAILY  Dispense: 90 tablet; Refill: 0    If protocol passes may refill for 12 months if within 3 months of last provider visit (or a total of 15 months).             Failed - Serum Potassium in past 12 months     No results found for: LN-POTASSIUM          Failed - Blood pressure filed in past 12 months     BP Readings from Last 1 Encounters:   07/10/18 128/68             Failed - Serum Creatinine in past 12 months     Creatinine   Date Value Ref Range Status   07/09/2018 0.92 0.60 - 1.10 mg/dL Final

## 2021-06-01 VITALS — WEIGHT: 206.1 LBS | HEIGHT: 64 IN | BODY MASS INDEX: 35.19 KG/M2

## 2021-06-01 VITALS — HEIGHT: 64 IN | WEIGHT: 200.8 LBS | BODY MASS INDEX: 34.28 KG/M2

## 2021-06-01 VITALS — HEIGHT: 64 IN | WEIGHT: 200.3 LBS | BODY MASS INDEX: 34.19 KG/M2

## 2021-06-01 VITALS — WEIGHT: 200 LBS | HEIGHT: 64 IN | BODY MASS INDEX: 34.15 KG/M2

## 2021-06-01 NOTE — TELEPHONE ENCOUNTER
Please contact this patient and help her schedule a follow-up visit. I sent a refill when for the blood pressure medication, but she is overdue to be seen.  Thank you, DE

## 2021-06-01 NOTE — TELEPHONE ENCOUNTER
Left message to call back for: Pt to call back on mainline to schedule an appointment  Information to relay to patient:  LVM for Pt stating that Rx was refilled for 90 tablets but also she is overdue for a Follow-up visit with PCP.  Please help the Pt schedule an appointment when she calls back.

## 2021-06-01 NOTE — TELEPHONE ENCOUNTER
RN cannot approve Refill Request    RN can NOT refill this medication Protocol failed and NO refill given.     Sheila Young, [facility] Triage/Med Refill 9/10/2019    Requested Prescriptions   Pending Prescriptions Disp Refills     hydroCHLOROthiazide (HYDRODIURIL) 25 MG tablet [Pharmacy Med Name: HYDROCHLOROTHIAZIDE TAB 25MG] 90 tablet 4     Sig: TAKE 1 TABLET DAILY       Diuretics/Combination Diuretics Refill Protocol  Failed - 9/9/2019  2:42 PM        Failed - Visit with PCP or prescribing provider visit in past 12 months     Last office visit with prescriber/PCP: 9/25/2015 Cora Cat MD OR same dept: Visit date not found OR same specialty: 7/10/2018 Yumi Montilla MD  Last physical: 5/4/2018 Last MTM visit: Visit date not found   Next visit within 3 mo: Visit date not found  Next physical within 3 mo: Visit date not found  Prescriber OR PCP: Cora Cat MD  Last diagnosis associated with med order: 1. Benign essential hypertension  - hydroCHLOROthiazide (HYDRODIURIL) 25 MG tablet [Pharmacy Med Name: HYDROCHLOROTHIAZIDE TAB 25MG]; TAKE 1 TABLET DAILY  Dispense: 90 tablet; Refill: 4    If protocol passes may refill for 12 months if within 3 months of last provider visit (or a total of 15 months).             Failed - Serum Potassium in past 12 months      No results found for: LN-POTASSIUM          Failed - Serum Sodium in past 12 months      No results found for: LN-SODIUM          Failed - Blood pressure on file in past 12 months     BP Readings from Last 1 Encounters:   07/10/18 128/68             Failed - Serum Creatinine in past 12 months      Creatinine   Date Value Ref Range Status   07/09/2018 0.92 0.60 - 1.10 mg/dL Final

## 2021-06-07 NOTE — TELEPHONE ENCOUNTER
Left message to call back for: Gabi  Information to relay to patient:  [id number]. Please see note below:    Per Dr. Cat: patient has not had a physical for 2 years, she should have a virtual visit for a med check. Please assist patient in scheduling a telephone visit for a medication check

## 2021-06-07 NOTE — TELEPHONE ENCOUNTER
There is no pharmacy chosen.  Also, since patient has not had a physical for 2 years, she should have a virtual visit for a med check.

## 2021-06-07 NOTE — TELEPHONE ENCOUNTER
Who is calling:  patient  Reason for Call:  Scheduled telephone appointment  Date of last appointment with primary care: n/a  Okay to leave a detailed message: No call back needed

## 2021-06-07 NOTE — PROGRESS NOTES
"Gabi Sandhu is a 65 y.o. female who is being evaluated via a billable telephone visit.      The patient has been notified of following:     \"This telephone visit will be conducted via a call between you and your physician/provider. We have found that certain health care needs can be provided without the need for a physical exam.  This service lets us provide the care you need with a short phone conversation.  If a prescription is necessary we can send it directly to your pharmacy.  If lab work is needed we can place an order for that and you can then stop by our lab to have the test done at a later time.    Telephone visits are billed at different rates depending on your insurance coverage. During this emergency period, for some insurers they may be billed the same as an in-person visit.  Please reach out to your insurance provider with any questions.    If during the course of the call the physician/provider feels a telephone visit is not appropriate, you will not be charged for this service.\"    Patient has given verbal consent to a Telephone visit? Yes    Patient would like to receive their AVS by AVS Preference: Cm.    Additional provider notes:   Called patient to conduct a virtual visit for primarily follow-up of type 2 diabetes mellitus and hypertension.  This is being conducted via telephone due to coronavirus pandemic.  Patient has not had labs done for the past 2 years.  She states that this was related to an insurance issue.  She has insurance again now.  She has been working from home.  Patient's only current complaint is that 4 days ago she started to notice some burning with urination and a pink tinge on a pad that she wears.  She has a slight urinary incontinence, hence the reason for the pad.  She denies frequency or urgency of urination.  Approximately 1 week ago, she had a temp of 100.1  F.  She had no other symptoms at that time, and is unsure of the cause of the elevated temp.  These " have since resolved and her temps have been normal.  She has been on a computer much of the time, sitting most of the time, and not drinking as much as usual.  She does not have a history of recurrent urinary tract infections.  She denies any back pain or history of kidney stone.  In terms of her blood pressure, she has no ability to check this outside the clinic.  She is no longer taking hydrochlorothiazide, only lisinopril.  She denies chest pain or headaches.  She has no other questions or concerns today.    Labs Reviewed:  Lab Results   Component Value Date    WBC 7.9 05/04/2018    HGB 15.3 05/04/2018    HCT 44.8 05/04/2018     05/04/2018    CHOL 294 (H) 05/04/2018    TRIG 187 (H) 05/04/2018    HDL 50 05/04/2018    ALT 36 05/04/2018    AST 30 05/04/2018     07/09/2018    K 4.9 07/09/2018    CL 98 07/09/2018    CREATININE 0.92 07/09/2018    BUN 12 05/04/2018    CO2 26 07/09/2018    TSH 2.31 11/11/2016    HGBA1C 10.8 (H) 05/04/2018    MICROALBUR 22.05 (H) 11/11/2016       Objective:  Psychiatric: Speech is fluent and thought process is linear, good historian    Assessment/Plan:  1. Type 2 diabetes mellitus with microalbuminuria, without long-term current use of insulin (H)  Labs will be updated as below.  Patient has not been checking her blood sugars.  She continues on metformin as prescribed.  - Microalbumin, Random Urine; Future  - Comprehensive Metabolic Panel; Future  - Glycosylated Hemoglobin A1c; Future    2. Benign essential hypertension  Patient does not have the ability to check her blood pressure outside the clinic.  Will check labs as below.  She feels good in this regard.  - Comprehensive Metabolic Panel; Future  - HM2(CBC w/o Differential); Future    3. Hyperlipidemia, unspecified hyperlipidemia type  Patient has been tolerating simvastatin.  Lipid profile will be updated.  - Comprehensive Metabolic Panel; Future  - Lipid Cascade RANDOM; Future    4. Dysuria  Recommended UA.  Based upon  this result, we can start empiric antibiotic therapy if needed.  She has no history of recurrent UTIs.  - Urinalysis-UC if Indicated; Future        Phone call duration:  14 minutes    Cora Cat MD

## 2021-06-08 NOTE — TELEPHONE ENCOUNTER
Left message to call back for: Gabi  Information to relay to patient:  Please see Dr. Cat's message below. Thank you.

## 2021-06-08 NOTE — TELEPHONE ENCOUNTER
I had initially ordered the labs on  and they actually  yesterday before she came in to have them done.  We tried to reach her yesterday to see if I should add the urine test again, but she did not answer.  She will need to have a separate sample done if she feels that she still has UTI symptoms, or she can be seen if she feels the symptoms could be vaginal.

## 2021-06-08 NOTE — TELEPHONE ENCOUNTER
RN cannot approve Refill Request    RN can NOT refill this medication Protocol failed and NO refill given      Sheila Young, [facility] Connection Triage/Med Refill 6/11/2020    Requested Prescriptions   Pending Prescriptions Disp Refills     lisinopriL (PRINIVIL,ZESTRIL) 20 MG tablet 90 tablet 3     Sig: Take 1 tablet (20 mg total) by mouth daily.       Ace Inhibitors Refill Protocol Failed - 6/10/2020  8:55 AM        Failed - PCP or prescribing provider visit in past 12 months       Last office visit with prescriber/PCP: Visit date not found OR same dept: Visit date not found OR same specialty: 7/10/2018 Yumi Montilla MD  Last physical: 5/4/2018 Last MTM visit: Visit date not found   Next visit within 3 mo: Visit date not found  Next physical within 3 mo: Visit date not found  Prescriber OR PCP: Cora Cat MD  Last diagnosis associated with med order: 1. Benign essential hypertension  - lisinopriL (PRINIVIL,ZESTRIL) 20 MG tablet; Take 1 tablet (20 mg total) by mouth daily.  Dispense: 90 tablet; Refill: 0    2. Type 2 diabetes mellitus with microalbuminuria, without long-term current use of insulin (H)  - metFORMIN (GLUCOPHAGE) 500 MG tablet; Take 1 tablet (500 mg total) by mouth 2 (two) times a day with meals.  Dispense: 180 tablet; Refill: 0    3. Gastroesophageal reflux disease without esophagitis  - omeprazole (PRILOSEC) 20 MG capsule; Take 1 capsule (20 mg total) by mouth daily before breakfast.  Dispense: 90 capsule; Refill: 0    If protocol passes may refill for 12 months if within 3 months of last provider visit (or a total of 15 months).             Failed - Blood pressure filed in past 12 months     BP Readings from Last 1 Encounters:   07/10/18 128/68             Passed - Serum Potassium in past 12 months     Lab Results   Component Value Date    Potassium 4.2 06/09/2020             Passed - Serum Creatinine in past 12 months     Creatinine   Date Value Ref Range Status   06/09/2020 0.84 0.60 -  1.10 mg/dL Final                metFORMIN (GLUCOPHAGE) 500 MG tablet 180 tablet 3     Sig: Take 1 tablet (500 mg total) by mouth 2 (two) times a day with meals.       Metformin Refill Protocol Failed - 6/10/2020  8:55 AM        Failed - Blood pressure in last 12 months     BP Readings from Last 1 Encounters:   07/10/18 128/68             Failed - Visit with PCP or prescribing provider visit in last 6 months or next 3 months     Last office visit with prescriber/PCP: Visit date not found OR same dept: Visit date not found OR same specialty: 7/10/2018 Yumi Montilla MD Last physical: Visit date not found Last MTM visit: Visit date not found         Next appt within 3 mo: Visit date not found  Next physical within 3 mo: Visit date not found  Prescriber OR PCP: Cora Cat MD  Last diagnosis associated with med order: 1. Benign essential hypertension  - lisinopriL (PRINIVIL,ZESTRIL) 20 MG tablet; Take 1 tablet (20 mg total) by mouth daily.  Dispense: 90 tablet; Refill: 0    2. Type 2 diabetes mellitus with microalbuminuria, without long-term current use of insulin (H)  - metFORMIN (GLUCOPHAGE) 500 MG tablet; Take 1 tablet (500 mg total) by mouth 2 (two) times a day with meals.  Dispense: 180 tablet; Refill: 0    3. Gastroesophageal reflux disease without esophagitis  - omeprazole (PRILOSEC) 20 MG capsule; Take 1 capsule (20 mg total) by mouth daily before breakfast.  Dispense: 90 capsule; Refill: 0     If protocol passes may refill for 12 months if within 3 months of last provider visit (or a total of 15 months).           Passed - LFT or AST or ALT in last 12 months     Albumin   Date Value Ref Range Status   06/09/2020 3.5 3.5 - 5.0 g/dL Final     Bilirubin, Total   Date Value Ref Range Status   06/09/2020 0.6 0.0 - 1.0 mg/dL Final     Alkaline Phosphatase   Date Value Ref Range Status   06/09/2020 91 45 - 120 U/L Final     AST   Date Value Ref Range Status   06/09/2020 20 0 - 40 U/L Final     ALT   Date  Value Ref Range Status   06/09/2020 22 0 - 45 U/L Final     Protein, Total   Date Value Ref Range Status   06/09/2020 6.7 6.0 - 8.0 g/dL Final                Passed - GFR or Serum Creatinine in last 6 months     GFR MDRD Non Af Amer   Date Value Ref Range Status   06/09/2020 >60 >60 mL/min/1.73m2 Final     GFR MDRD Af Amer   Date Value Ref Range Status   06/09/2020 >60 >60 mL/min/1.73m2 Final             Passed - A1C in last 6 months     Hemoglobin A1c   Date Value Ref Range Status   06/09/2020 7.0 (H) 3.5 - 6.0 % Final               Passed - Microalbumin in last year      Microalbumin, Random Urine   Date Value Ref Range Status   06/09/2020 2.40 (H) 0.00 - 1.99 mg/dL Final                     omeprazole (PRILOSEC) 20 MG capsule 90 capsule 3     Sig: Take 1 capsule (20 mg total) by mouth daily before breakfast.       GI Medications Refill Protocol Failed - 6/10/2020  8:55 AM        Failed - PCP or prescribing provider visit in last 12 or next 3 months.     Last office visit with prescriber/PCP: Visit date not found OR same dept: Visit date not found OR same specialty: 7/10/2018 Yumi Montilla MD  Last physical: 5/4/2018 Last MTM visit: Visit date not found   Next visit within 3 mo: Visit date not found  Next physical within 3 mo: Visit date not found  Prescriber OR PCP: Cora Cat MD  Last diagnosis associated with med order: 1. Benign essential hypertension  - lisinopriL (PRINIVIL,ZESTRIL) 20 MG tablet; Take 1 tablet (20 mg total) by mouth daily.  Dispense: 90 tablet; Refill: 0    2. Type 2 diabetes mellitus with microalbuminuria, without long-term current use of insulin (H)  - metFORMIN (GLUCOPHAGE) 500 MG tablet; Take 1 tablet (500 mg total) by mouth 2 (two) times a day with meals.  Dispense: 180 tablet; Refill: 0    3. Gastroesophageal reflux disease without esophagitis  - omeprazole (PRILOSEC) 20 MG capsule; Take 1 capsule (20 mg total) by mouth daily before breakfast.  Dispense: 90 capsule; Refill:  0    If protocol passes may refill for 12 months if within 3 months of last provider visit (or a total of 15 months).                simvastatin (ZOCOR) 40 MG tablet 90 tablet 3     Sig: Take 1 tablet (40 mg total) by mouth at bedtime.       Statins Refill Protocol (Hmg CoA Reductase Inhibitors) Failed - 6/10/2020  8:55 AM        Failed - PCP or prescribing provider visit in past 12 months      Last office visit with prescriber/PCP: Visit date not found OR same dept: Visit date not found OR same specialty: 7/10/2018 Yumi Montilla MD  Last physical: 5/4/2018 Last MTM visit: Visit date not found   Next visit within 3 mo: Visit date not found  Next physical within 3 mo: Visit date not found  Prescriber OR PCP: Cora Cat MD  Last diagnosis associated with med order: 1. Benign essential hypertension  - lisinopriL (PRINIVIL,ZESTRIL) 20 MG tablet; Take 1 tablet (20 mg total) by mouth daily.  Dispense: 90 tablet; Refill: 0    2. Type 2 diabetes mellitus with microalbuminuria, without long-term current use of insulin (H)  - metFORMIN (GLUCOPHAGE) 500 MG tablet; Take 1 tablet (500 mg total) by mouth 2 (two) times a day with meals.  Dispense: 180 tablet; Refill: 0    3. Gastroesophageal reflux disease without esophagitis  - omeprazole (PRILOSEC) 20 MG capsule; Take 1 capsule (20 mg total) by mouth daily before breakfast.  Dispense: 90 capsule; Refill: 0    If protocol passes may refill for 12 months if within 3 months of last provider visit (or a total of 15 months).

## 2021-06-08 NOTE — TELEPHONE ENCOUNTER
Spoke with patient and she thought she did the urine yesterday for UTI sx. Patient states she is still having some burning sensation with urination, some pressure. But sx are not as bad. What to do? Sensation has weakened but has not gone away.

## 2021-06-08 NOTE — TELEPHONE ENCOUNTER
Patient Returning Call  Reason for call:  Return call   Information relayed to patient:  Caller was informed of message below.   Patient has additional questions:  Yes  If YES, what are your questions/concerns:  Caller stated yes she is still having symptoms but she already had the test done yesterday. Caller is just now waiting to hear back from the doctor on regards to medication.   Okay to leave a detailed message?: Yes

## 2021-06-08 NOTE — TELEPHONE ENCOUNTER
Left message to call back for: Gabi  Information to relay to patient:  LMTCB. Please see message below from Dr. Cat

## 2021-06-08 NOTE — TELEPHONE ENCOUNTER
Left message to call back for: Gabi  Information to relay to patient:  LMTCB. Dr. Cat received a call today from lab. UA order was placed over a month ago. Is pt still having symptoms?

## 2021-06-09 ENCOUNTER — COMMUNICATION - HEALTHEAST (OUTPATIENT)
Dept: FAMILY MEDICINE | Facility: CLINIC | Age: 66
End: 2021-06-09

## 2021-06-09 DIAGNOSIS — E11.29 TYPE 2 DIABETES MELLITUS WITH MICROALBUMINURIA, WITHOUT LONG-TERM CURRENT USE OF INSULIN (H): ICD-10-CM

## 2021-06-09 DIAGNOSIS — R80.9 TYPE 2 DIABETES MELLITUS WITH MICROALBUMINURIA, WITHOUT LONG-TERM CURRENT USE OF INSULIN (H): ICD-10-CM

## 2021-06-12 ENCOUNTER — RECORDS - HEALTHEAST (OUTPATIENT)
Dept: ADMINISTRATIVE | Facility: OTHER | Age: 66
End: 2021-06-12

## 2021-06-12 DIAGNOSIS — K21.9 GASTROESOPHAGEAL REFLUX DISEASE WITHOUT ESOPHAGITIS: ICD-10-CM

## 2021-06-12 DIAGNOSIS — E78.5 HYPERLIPIDEMIA, UNSPECIFIED HYPERLIPIDEMIA TYPE: ICD-10-CM

## 2021-06-12 DIAGNOSIS — I10 BENIGN ESSENTIAL HYPERTENSION: ICD-10-CM

## 2021-06-12 RX ORDER — LISINOPRIL 20 MG/1
TABLET ORAL
Qty: 90 TABLET | Refills: 0 | Status: SHIPPED | OUTPATIENT
Start: 2021-06-12 | End: 2022-01-27

## 2021-06-12 RX ORDER — SIMVASTATIN 40 MG
TABLET ORAL
Qty: 90 TABLET | Refills: 0 | Status: SHIPPED | OUTPATIENT
Start: 2021-06-12 | End: 2021-09-24

## 2021-06-16 PROBLEM — H91.22 SUDDEN HEARING LOSS, LEFT: Status: ACTIVE | Noted: 2018-08-01

## 2021-06-17 NOTE — TELEPHONE ENCOUNTER
Telephone Encounter by Coco Melgoza LPN at 6/10/2020  1:45 PM     Author: Coco Melgoza LPN Service: -- Author Type: Licensed Nurse    Filed: 6/10/2020  1:45 PM Encounter Date: 2020 Status: Signed    : Coco Melgoza LPN (Licensed Nurse)       Patient Returning Call  Reason for call:  Patient returning call   Information relayed to patient:  Cora Cat MD   to Elizabeth Ronquillo CMA         9:21 AM   Note      I had initially ordered the labs on  and they actually  yesterday before she came in to have them done.  We tried to reach her yesterday to see if I should add the urine test again, but she did not answer.  She will need to have a separate sample done if she feels that she still has UTI symptoms, or she can be seen if she feels the symptoms could be vaginal.      Patient has additional questions:  No  If YES, what are your questions/concerns:  Patient verbalized understanding above message .  Okay to leave a detailed message?: No

## 2021-06-17 NOTE — PROGRESS NOTES
Assessment/Plan:     1. Routine general medical examination at a health care facility  Routine history and physical, updated in EMR.  Fasting labs updated as below.  Patient wishes to try Cologuard for colon cancer screening.  She will check with her insurance about shingles vaccine.  Immunizations are otherwise up-to-date.  Offered vaginal exam, patient declines.  Plan repeat physical in 1 year.    2. Type 2 diabetes mellitus with microalbuminuria, without long-term current use of insulin  Very poorly controlled, patient states she is now interested in treating this.  Recommended Metformin and she will start this daily for 1 week, then increase to twice daily.  She will start checking her blood sugars again and trying to pursue a low carb diet along with some regular exercise.  Advised follow-up in 1 month, patient agrees to follow-up in 3 months.  She also agrees to take her simvastatin more regularly along with a baby aspirin.  - Glycosylated Hemoglobin A1c  - Lipid [place] FASTING  - Comprehensive Metabolic Panel    3. Benign essential hypertension  Not ideally controlled on current dose of lisinopril.  Patient is resistant to any changes in this medication.  When I see her back, we will discuss possibly adding some HCTZ to this.  - lisinopril (PRINIVIL,ZESTRIL) 20 MG tablet; TAKE 1 TABLET DAILY  Dispense: 90 tablet; Refill: 3  - Lipid [place] FASTING  - Comprehensive Metabolic Panel  - HM2(CBC w/o Differential)    4. Hyperlipidemia, unspecified hyperlipidemia type  Not well controlled on current dose of simvastatin, but patient admits she does not take this every day.  Advised better compliance with this medication with recheck in 3 months when I see her back.  - Lipid Cascade FASTING    5. Gastroesophageal reflux disease without esophagitis  Symptoms well controlled on omeprazole daily.    6. Obesity (BMI 30-39.9)  Discussed a low carb, low sugar diet and regular cardiovascular exercise.  Patient declines a  visit with dietitian or diabetic educator to discuss nutrition.  The following high BMI interventions were performed this visit: encouragement to exercise, weight monitoring, special diet education and lifestyle education regarding diet  - Lipid [place] FASTING  - Comprehensive Metabolic Panel    7. Colon cancer screening  - Cologuard      Subjective:      Gabi Sandhu is a 63 y.o. female who presents for an annual exam. The patient is not sexually active. The patient participates in regular exercise: no. The patient reports that there is not domestic violence in her life.  Patient reports she is now feeling motivated to better control her type 2 diabetes mellitus.  She has been very busy with some social responsibilities, and has not been taking good care of herself.  She now wishes to get back into a regular exercise routine, start eating healthy, and take medication in relation to her diabetes.  She denies any excessive thirst or frequent urination, has been feeling well.    Healthy Habits:   Regular Exercise: No  Sunscreen Use: Yes  Healthy Diet: No  Dental Visits Regularly: Yes  Seat Belt: Yes  Sexually active: No  Self Breast Exam Monthly:Yes  Colonoscopy: Yes and thinks she had this in the past, not on file, unsure where done  Lipid Profile: Yes  Glucose Screen: Yes  Prevention of Osteoporosis: Yes  Last Dexa: No      Immunization History   Administered Date(s) Administered     Influenza, seasonal,quad inj 36+ mos 2015, 2016     Td,adult,historic,unspecified 2004     Tdap 2016     Immunization status: missing doses of Shingrix, patient to check with insurance.    No exam data present    Gynecologic History  No LMP recorded. Patient has had a hysterectomy.  Contraception: status post hysterectomy  Last Pap: unknown. Results were: normal  Last mammogram: 17. Results were: normal      OB History    Para Term  AB Living     0      SAB TAB Ectopic Multiple Live  Births                    Current Outpatient Prescriptions   Medication Sig Dispense Refill     aspirin 81 MG EC tablet Take 81 mg by mouth once daily.       blood sugar diagnostic (ONETOUCH VERIO) Strp Apply 1 each topically 2 (two) times a day.       cholecalciferol, vitamin D3, 2,000 unit Tab Take 1 tablet by mouth once daily.       LATANOPROST OPHT Apply to eye.       lisinopril (PRINIVIL,ZESTRIL) 20 MG tablet TAKE 1 TABLET DAILY 90 tablet 3     omega-3 fatty acids-vitamin E (FISH OIL) 1,000 mg cap Take 1 tablet by mouth once daily.       omeprazole 20 mg TbEC Take 20 mg by mouth once daily.       simvastatin (ZOCOR) 40 MG tablet Take 1 tablet (40 mg total) by mouth bedtime. 90 tablet 3     TIMOLOL MALEATE, PF, OPHT Apply to eye.       No current facility-administered medications for this visit.      No past medical history on file.  Past Surgical History:   Procedure Laterality Date     CHOLECYSTECTOMY       HYSTERECTOMY  1998    for dysfunctional uterine bleeding, no cancer     OOPHORECTOMY Bilateral 1998     OVARIAN CYST SURGERY       TONSILLECTOMY       Erythromycin  Family History   Problem Relation Age of Onset     Diabetes Mother      Heart disease Father      CHF     Diabetes Brother      Breast cancer Paternal Aunt      early 40s     Prostate cancer Neg Hx      Cancer Neg Hx      Colon cancer Neg Hx      Social History     Social History     Marital status: Single     Spouse name: N/A     Number of children: N/A     Years of education: N/A     Occupational History     Not on file.     Social History Main Topics     Smoking status: Never Smoker     Smokeless tobacco: Never Used     Alcohol use 6.0 oz/week     10 Glasses of wine per week     Drug use: Not on file     Sexual activity: Not Currently     Other Topics Concern     Not on file     Social History Narrative       Review of Systems  General:  Denies problem  Eyes: Denies problem  Ears/Nose/Throat: Denies problem  Cardiovascular: Denies  "problem  Respiratory:  Denies problem  Gastrointestinal:  Denies problem  Genitourinary: Denies problem  Musculoskeletal:  Denies problem  Skin: Denies problem  Neurologic: Denies problem  Psychiatric: Denies problem  Endocrine: Denies problem  Heme/Lymphatic: Denies problem   Allergic/Immunologic: Denies problem        Objective:         Vitals:    05/04/18 0732   BP: 148/90   Pulse: 96   Resp: 20   Weight: 206 lb 1.6 oz (93.5 kg)   Height: 5' 4\" (1.626 m)     Body mass index is 35.38 kg/(m^2).    Physical Exam:  General Appearance: Alert, cooperative, no distress, appears stated age  Head: Normocephalic, without obvious abnormality, atraumatic  Eyes: PERRL, conjunctiva/corneas clear, EOM's intact  Ears: Normal TM's and external ear canals, both ears  Nose: Nares normal, septum midline, mucosa normal, no drainage  Throat: Lips, mucosa, and tongue normal; teeth and gums normal  Neck: Supple, symmetrical, trachea midline, no adenopathy; thyroid: not enlarged, symmetric, no tenderness/mass/nodules; no carotid bruit or JVD  Back: Symmetric, no curvature, ROM normal, no CVA tenderness  Lungs: Clear to auscultation bilaterally, respirations unlabored  Breasts: No breast masses, tenderness, asymmetry, or nipple discharge  Heart: Regular rate and rhythm, S1 and S2 normal, no murmur, rub, or gallop  Abdomen: Soft, non-tender, bowel sounds active all four quadrants, no masses, no organomegaly  Pelvic:Not examined  Extremities: Extremities normal, atraumatic, no cyanosis or edema  Skin: Skin color, texture, turgor normal, no rashes or lesions  Lymph nodes: Cervical, supraclavicular, and axillary nodes normal  Neurologic: Normal        "

## 2021-06-18 NOTE — PROGRESS NOTES
Assessment / Impression     1. Meniere's disease, left  Ambulatory referral to ENT   2. Acute suppurative otitis media of left ear without spontaneous rupture of tympanic membrane, recurrence not specified     3. Hearing loss of left ear, unspecified hearing loss type  Ambulatory referral to Audiology    Ambulatory referral to ENT           Plan:     Discussed with patient findings on exam today, and also with her triad of hearing loss, tinnitus, vertigo, it is likely patient also has Ménière's disease.  Given abnormal findings on left ear exam, discussed treatment with antibiotics for now to see if this improves her symptoms.  She was also given a handout regarding Ménière's disease, discussed lifestyle changes, including limiting sodium and MSG intake, as well as her alcohol intake.  If, after antibiotic treatment her symptoms persist, I did give her referral to audiology, would recommend she schedule appointment, consider referral to ENT as well if needed and symptoms persist.  She may benefit from diuretic treatment, or other treatment options.  Patient understands, agrees the plan.    Subjective:      HPI: Gabi Sandhu is a 63 y.o. female, new to me, who presents for left hearing loss concerns.  Patient states she woke up on Dilia 15, and noted sudden left ear hearing loss.  Since then it may be be only slightly improved.  She went to see the nurse at her place of employment, who did use otoscope to look in patient's ear, did not note any wax or other physical obstruction within her ear canal, advised patient to follow-up with her PCP.  Patient states she has noted a slight popping sensation.  She has also noted occasional tinnitus and vertigo symptoms intermittently that can last up to a few minutes.  She denies any ear pain.  She did have a fever approximately 2 weeks ago, though no fever since then.  Patient states that she has had tinnitus intermittently for several years.  Little caffeine use, though  will drink about 7-8 glasses of wine per week.      Medical History:     Patient Active Problem List   Diagnosis     Acid reflux     Benign essential hypertension     Hyperlipidemia, unspecified hyperlipidemia type     Type II diabetes mellitus (H)     Obesity (BMI 30-39.9)       No past medical history on file.    Past Surgical History:   Procedure Laterality Date     CHOLECYSTECTOMY       HYSTERECTOMY  1998    for dysfunctional uterine bleeding, no cancer     OOPHORECTOMY Bilateral 1998     OVARIAN CYST SURGERY       TONSILLECTOMY         Current Medications:     Current Outpatient Prescriptions   Medication Sig Note     aspirin 81 MG EC tablet Take 81 mg by mouth once daily. 9/25/2015: Received from: [facility] Received Sig:      blood sugar diagnostic (ONETOUCH VERIO) Strp Apply 1 each topically 2 (two) times a day. 9/25/2015: Received from: [facility] Received Sig:      cholecalciferol, vitamin D3, 2,000 unit Tab Take 1 tablet by mouth once daily. 9/25/2015: Received from: [facility] Received Sig:      LATANOPROST OPHT Apply to eye.      lisinopril (PRINIVIL,ZESTRIL) 20 MG tablet TAKE 1 TABLET DAILY      metFORMIN (GLUCOPHAGE) 500 MG tablet Take 1 tablet (500 mg total) by mouth 2 (two) times a day with meals.      omega-3 fatty acids-vitamin E (FISH OIL) 1,000 mg cap Take 1 tablet by mouth once daily. 9/25/2015: Received from: [facility] Received Sig:      omeprazole 20 mg TbEC Take 20 mg by mouth daily before breakfast.      simvastatin (ZOCOR) 40 MG tablet Take 1 tablet (40 mg total) by mouth at bedtime.      TIMOLOL MALEATE, PF, OPHT Apply to eye.      amoxicillin (AMOXIL) 875 MG tablet Take 1 tablet (875 mg total) by mouth 2 (two) times a day for 10 days.        Family History:     Family History   Problem Relation Age of Onset     Diabetes Mother      Heart disease Father      CHF     Diabetes Brother      Breast cancer Paternal Aunt      early 40s     Prostate cancer Neg Hx      Cancer Neg  "Hx      Colon cancer Neg Hx        Review of Systems  All other systems reviewed and are negative.         Social History:     History   Smoking Status     Never Smoker   Smokeless Tobacco     Never Used     Social History     Social History Narrative   Works at [employer].      Objective:     /74 (Patient Site: Left Arm, Patient Position: Sitting, Cuff Size: Adult Regular)  Pulse 74  Resp 18  Ht 5' 4\" (1.626 m)  Wt 200 lb 12.8 oz (91.1 kg)  BMI 34.47 kg/m2  Physical Examination: General appearance - alert, well appearing, and in no distress  Eyes: pupils equal and reactive, extraocular eye movements intact.  Burton-Hallpike maneuver did not produce any symptoms of horizontal nystagmus bilaterally.  Ears: normal external ears, clear canals, right TM appears normal, with no redness or bulging noted.  Left TM without erythema, though purulent fluid and bulging noted.  Nose: no hypertrophy  Mouth: mucous membranes moist, pharynx normal without lesions  Neck: supple, no significant adenopathy or thyromegaly  Lungs: clear to auscultation, no wheezes, rales or rhonchi, symmetric air entry  Heart: normal rate, regular rhythm, normal S1, S2, no murmurs.  Psychiatric: Normal affect. Does not appear anxious or depressed.    No results found for this or any previous visit (from the past 168 hour(s)).      Yumi Montilla MD  6/19/2018  8:25 AM        "

## 2021-06-18 NOTE — PROGRESS NOTES
Assessment / Impression     1. Meniere's disease, unspecified laterality  Electrolyte Profile    Creatinine   2. Benign essential hypertension  Electrolyte Profile    Creatinine           Plan:     Given patient has a negative Ze-Hallpike maneuver, appears to be less likely positional vertigo, and again, with her hearing loss, ear fullness, tinnitus, may be related to Ménière's disease.  Discussed with patient that we could try adding a diuretic, especially given her blood pressure is elevated today, to see if this helps her symptoms.  Will begin hydrochlorothiazide 25 mg daily, and I would like her to follow-up in 2 weeks.  Plan to recheck labs 1-2 days prior to her next office visit as well.  I did give her a mild prescription for meclizine, which she may use as needed, however I would like her to use that judiciously, given her age and possible lightheadedness.  Discussed possible side effects of hydrochlorothiazide as well.  F/u in 2 weeks.    Subjective:      HPI: Gabi Sandhu is a 63 y.o. female who presents for follow-up of ear symptoms and dizziness.  Please see my note from June 19 for more details.  Briefly, when patient presented to me on the 19th, she states she noted sudden hearing loss, tinnitus, and vertigo symptoms.  Her exam was significant with purulent fluid noted in her left TM, and for this reason, we did discuss treatment with amoxicillin.  Given her symptoms, it was thought patient may also possibly have Ménière's disease, and we discussed lifestyle changes including adequate hydration and reducing sodium intake.  I did also give her a referral to ENT, however since she tried to schedule an appointment today and states she is unable to get in until the end of July.    She is here today because she started her amoxicillin on Wednesday, June 20, was doing well, however Thursday, she had lowered her head as she tried to put her pants on, and felt significant vertigo.  Since then, when she  is lying in bed, particularly when rolling to the right she was experiencing vertigo, however last night when she was lying in bed even without movement she experienced some vertigo that lasted about 1 minute.  She continues to note some hearing loss, and tinnitus that started again on Saturday.  She has tried to eat a low-salt diet.      Medical History:     Patient Active Problem List   Diagnosis     Acid reflux     Benign essential hypertension     Hyperlipidemia, unspecified hyperlipidemia type     Type II diabetes mellitus (H)     Obesity (BMI 30-39.9)       No past medical history on file.    Past Surgical History:   Procedure Laterality Date     CHOLECYSTECTOMY       HYSTERECTOMY  1998    for dysfunctional uterine bleeding, no cancer     OOPHORECTOMY Bilateral 1998     OVARIAN CYST SURGERY       TONSILLECTOMY         Current Medications:     Current Outpatient Prescriptions   Medication Sig Note     amoxicillin (AMOXIL) 875 MG tablet Take 1 tablet (875 mg total) by mouth 2 (two) times a day for 10 days.      aspirin 81 MG EC tablet Take 81 mg by mouth once daily. 9/25/2015: Received from: [facility] Received Sig:      blood sugar diagnostic (ONETOUCH VERIO) Strp Apply 1 each topically 2 (two) times a day. 9/25/2015: Received from: [facility] Received Sig:      cholecalciferol, vitamin D3, 2,000 unit Tab Take 1 tablet by mouth once daily. 9/25/2015: Received from: [facility] Received Sig:      LATANOPROST OPHT Apply to eye.      lisinopril (PRINIVIL,ZESTRIL) 20 MG tablet TAKE 1 TABLET DAILY      metFORMIN (GLUCOPHAGE) 500 MG tablet Take 1 tablet (500 mg total) by mouth 2 (two) times a day with meals.      omega-3 fatty acids-vitamin E (FISH OIL) 1,000 mg cap Take 1 tablet by mouth once daily. 9/25/2015: Received from: [facility] Received Sig:      omeprazole 20 mg TbEC Take 20 mg by mouth daily before breakfast.      simvastatin (ZOCOR) 40 MG tablet Take 1 tablet (40 mg total) by mouth at  "bedtime.      TIMOLOL MALEATE, PF, OPHT Apply to eye.      hydroCHLOROthiazide (HYDRODIURIL) 25 MG tablet Take 1 tablet (25 mg total) by mouth daily.      meclizine (ANTIVERT) 25 mg tablet Take 1 tablet (25 mg total) by mouth 3 (three) times a day as needed for dizziness.        Family History:     Family History   Problem Relation Age of Onset     Diabetes Mother      Heart disease Father      CHF     Diabetes Brother      Breast cancer Paternal Aunt      early 40s     Prostate cancer Neg Hx      Cancer Neg Hx      Colon cancer Neg Hx        Review of Systems  All other systems reviewed and are negative.         Social History:     History   Smoking Status     Never Smoker   Smokeless Tobacco     Never Used     Social History     Social History Narrative         Objective:     /84 (Patient Site: Left Arm, Patient Position: Sitting, Cuff Size: Adult Large)  Pulse 78  Resp 18  Ht 5' 4\" (1.626 m)  Wt 200 lb (90.7 kg)  BMI 34.33 kg/m2  Physical Examination: General appearance - alert, well appearing, and in no distress  Eyes: pupils equal and reactive, extraocular eye movements intact.  [place]-Hallpike maneuver did not produce any vertigo symptoms or horizontal nystagmus bilaterally.  Ears: normal external ears, clear canals,  TMs appear normal, with no redness or bulging noted.  Left TM without erythema, though purulent fluid and bulging noted.   Mouth: mucous membranes moist, pharynx normal without lesions  Neck: supple, no significant adenopathy or thyromegaly  Lungs: clear to auscultation, no wheezes, rales or rhonchi, symmetric air entry  Heart: normal rate, regular rhythm, normal S1, S2, no murmurs.  Neurological: alert, oriented, normal speech, no focal findings or movement disorder noted.    Extremities: No edema, no clubbing or cyanosis  Psychiatric: Normal affect. Does not appear anxious or depressed.    No results found for this or any previous visit (from the past 168 hour(s)).      Yumi Montilla, " MD  6/25/2018  4:17 PM

## 2021-06-19 NOTE — PROGRESS NOTES
Audiology Report    Referring Provider:  Dr. Bowers    History:  Gabi Sandhu is seen in conjunction with ENT appointment today. She is scheduled to see Dr. Bowers tomorrow. Gabi has a history of sudden sensorineural hearing loss in her left ear which occurred on 6/15/18. She received a steroid injection in her left ear on 7/25/18. Gabi states she now hears a crackling sound in her left ear when she does the valsalva maneuver. She reports she hears a low static or ocean sound in her left ear which is new for her. Gabi states she continues to hear a radio show in her left ear which sounds more clear. She reports that she has noticed a slight improvement in hearing in her left ear but she still is not able to hear things clearly in that ear.     Results:     Left Ear Right Ear   Otoscopy clear canals clear canals   Pure Tone Audiometry normal hearing at 250 Hz sloping to profound sensorineural hearing loss  mild sensorineural hearing loss sloping to normal hearing   Word Recognition very poor excellent   Tympanometry flat (Type B)  with large ear canal volume indicating possible perforation  normal (Type A)     Transducer: Insert earphones and Circumaural headphones    Reliability was good  and there was good  SRT to PTA agreement. These results are consistent with results from 7/25/18.    Plan:  The patient is returned to ENT for follow up.  She should return for retesting with ENT recommendation.      Please see audiogram under  media  and  audiogram  in the patient s chart.     Markell Ford, CCC-A  [place] Licensed Audiologist #[id number]

## 2021-06-19 NOTE — PROGRESS NOTES
HPI: SHe notices a slight increase in hearing on the left side since the last injection.    Past medical history, surgical history, social history, family history, medications, and allergies have been reviewed with the patient and are documented above.    Review of Systems: a 10-system review was performed. Pertinent positives are noted in the HPI and on a separate scanned document in the chart.    PHYSICAL EXAMINATION:  GEN: no acute distress, normocephalic  EYES: extraocular movements are intact, pupils are equal and round. Sclera clear.   EARS:     PROCEDURE:  Transtympanic steroid injection  Left ear is inspected and cleaned under the microscope.  Phenol is applied to the right ear and 1 ml of 10mg/ml Decadron is injected into the middle ear space.  Patient tolerated the procedure well.  Patient was discharged after 20 minutes of laying with the affected ear up.     NEURO: CN II-XII are intact bilaterally. alert and oriented. No nystagmus. Gait is normal.  PULM: breathing comfortably on room air, normal chest expansion with respiration  HEART: regular rate and rhythm, no peripheral edema      MEDICAL DECISION-MAKING: Follow up in 2 weeks with audiogram.

## 2021-06-19 NOTE — PROGRESS NOTES
Audiology Report    Referring Provider:   Service    History:  Gabi Sandhu is seen in conjunction with ENT appointment today. She reports she woke up on 6/15/18 unable to hear from her left ear. Gabi states she hears an intermittent radio sound in her left ear since 6/15/18. She reports that initially she was unable to hear a phone dial tone in her left ear but she is able to hear it now at a muffled volume. Gabi reports she has experienced more frequent vertigo since 6/15/18. She states she has been taking medication and sleeping on her back which has helped her vertigo symptoms. She denies a history of aural fullness, ear surgery, noise exposure, and family history of hearing loss.     Results:     Left Ear Right Ear   Otoscopy clear canals clear canals   Pure Tone Audiometry normal hearing at 250 Hz sloping to profound sensorineural hearing loss  Mild sensorineural hearing loss sloping to normal hearing   Word Recognition very poor excellent   Tympanometry normal (Type A)  normal (Type A)     Transducer: Circumaural headphones    Reliability was good  and there was good  SRT to PTA agreement.       Plan:  The patient is returned to ENT for follow up.  She should return for retesting with ENT recommendation.  The patient is a candidate for hearing aids, and should consider pending medical clearance.    Please see audiogram under  media  and  audiogram  in the patient s chart.     Britta Ford., CCC-A  [place] Licensed Audiologist #[id number]

## 2021-06-19 NOTE — PROGRESS NOTES
HPI:  She notes no big change in her hearing since the first injection.  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  She describes frequent headaches.      Past medical history, surgical history, social history, family history, medications, and allergies have been reviewed with the patient and are documented above.    Review of Systems: a 10-system review was performed. Pertinent positives are noted in the HPI and on a separate scanned document in the chart.    PHYSICAL EXAMINATION:  GEN: no acute distress, normocephalic  EYES: extraocular movements are intact, pupils are equal and round. Sclera clear.   EARS:    PROCEDURE:  Transtympanic steroid injection  Right ear is inspected and cleaned under the microscope.  Phenol is applied to the right ear and 1 ml of 10mg/ml Decadron is injected into the middle ear space.  Patient tolerated the procedure well.  Patient was discharged after 20 minutes of laying with the affected ear up.   PULM: breathing comfortably on room air, normal chest expansion with respiration  HEART: regular rate and rhythm, no peripheral edema    AUDIOGRAM:  No change in normal to severe hearing loss on the left    MEDICAL DECISION-MAKING: Recommend second injection.  Given asymmetry, and history of chordoma, recommend MRI.  Followup in 1 to 2 weeks for consideration of third injection.

## 2021-06-19 NOTE — PROGRESS NOTES
Gabi Sandhu is a 63 y.o. female seen in consultation at the request of Dr. Cat for hearing loss on the left and vertigo.  Patient has noticed acute hearing loss in the left ear 6/15/2018.  A Week later she would notice positional vertigo.  Denies otologic history of infections or surgeries.  Intermittent tinnitus on the left.       ALLERGY:    Allergies   Allergen Reactions     Erythromycin Rash       MEDICATIONS:     Current Outpatient Prescriptions on File Prior to Visit   Medication Sig Dispense Refill     aspirin 81 MG EC tablet Take 81 mg by mouth once daily.       blood sugar diagnostic (ONETOUCH VERIO) Strp Apply 1 each topically 2 (two) times a day.       cholecalciferol, vitamin D3, 2,000 unit Tab Take 1 tablet by mouth once daily.       hydroCHLOROthiazide (HYDRODIURIL) 25 MG tablet Take 1 tablet (25 mg total) by mouth daily. (Patient not taking: Reported on 7/10/2018) 30 tablet 1     LATANOPROST OPHT Apply to eye.       lisinopril (PRINIVIL,ZESTRIL) 20 MG tablet TAKE 1 TABLET DAILY 90 tablet 3     meclizine (ANTIVERT) 25 mg tablet Take 1 tablet (25 mg total) by mouth 3 (three) times a day as needed for dizziness. (Patient not taking: Reported on 7/10/2018) 30 tablet 1     metFORMIN (GLUCOPHAGE) 500 MG tablet Take 1 tablet (500 mg total) by mouth 2 (two) times a day with meals. (Patient not taking: Reported on 7/10/2018) 180 tablet 3     omega-3 fatty acids-vitamin E (FISH OIL) 1,000 mg cap Take 1 tablet by mouth once daily.       omeprazole 20 mg TbEC Take 20 mg by mouth daily before breakfast. 90 each 3     simvastatin (ZOCOR) 40 MG tablet Take 1 tablet (40 mg total) by mouth at bedtime. 90 tablet 3     TIMOLOL MALEATE, PF, OPHT Apply to eye.       No current facility-administered medications on file prior to visit.        Past Medical/Surgical History, Family History and Social History reviewed in detail and documented separately in the medical record.    Complete Review of Systems:  A  10-point review was performed.  Pertinent positives are noted in the HPI and on a separate scanned document in the chart.    EXAM:  There were no vitals filed for this visit.    Nurse documentation reviewed  and documented separately.    General Appearance: Pleasant, alert, appropriate appearance for age. No acute distress    Head Exam: Normal. Normocephalic, atraumatic.    Eye Exam: Normal external eye, conjunctiva, lids, cornea. Extra-ocular movements are intact.    Left external ear: normal  Left otoscopic exam: Normal EAC. Normal TM     Right external ear: normal  Right otoscopic exam: Normal EAC. Normal TM    PROCEDURE:  Transtympanic steroid injection  Leftt ear is inspected and cleaned under the microscope.  Phenol is applied to the left posterior tympanic membrane and 1 ml of 10mg/ml Decadron is injected into the middle ear space.  Patient tolerated the procedure well.  Patient was discharged after 20 minutes of laying with the affected ear up.       Nose Exam: Normal external nose. Septum midline. Nasal mucosa normal.  Inferior turbinates normal.    OroPharynx Exam: Dental hygiene adequate. Normal tongue. Normal buccal mucosa. Normal palate.  Normal pharynx. Normal tonsils.    Neck Exam: Supple, no masses or nodes. Trachea and larynx midline.    Thyroid Exam: No tenderness, nodules or enlargement.    Salivary Glands: nontender without masses    Neuro: Alert and oriented times 3, CN 2-12 grossly intact, no nystagmus, PERRL, EOMI, normal speech and gait    Chest/Respiratory Exam: Normal chest wall motion and respiratory effort. No audible stridor or wheezing.    Cardiovascular Exam: Regular rate and rhythm.  No cyanosis, clubbing or edema.    Pulses: carotid pulses normal    ASSESSMENT:  1. Sudden left hearing loss    2. Vertigo        PLAN: Findings, assessment, and management options were discussed.   I think this is less likely Meniere's disease as her hearing change did not correlate with her vertigo  tightly.  Her symptoms ranged from episodes lasting seconds to minutes and were quite positional.  I think a scenario of a viral labyrinthitis or SSNHL with secondary BPPV is more likely.  It is at the tail end of the treatment window for her hearing loss and discussed oral versus injectable steroids.  She is diabetic thus we elected to try a 3 injection course of dexamethasone.

## 2021-06-19 NOTE — PROGRESS NOTES
Assessment / Impression     1. Meniere's disease, unspecified laterality  Electrolyte Profile    Creatinine   2. Vertigo     3. Need for shingles vaccine  Varicella Zoster, Recombinant Vaccine IM           Plan:     Discussed with patient findings on physical exam today.  I did offer her the option to try vestibular rehab through physical therapy to see if this improves her symptoms, however, patient stated that due to work schedule she does not have much time, and she continues to note ongoing hearing loss.  Given hypertension, did not want to start patient on nasal decongestion at this time.  I did suggest she try hydrochlorothiazide again, and she can monitor her blood pressure and symptoms of lightheadedness closely, discontinue medication if she is unable to tolerate it.  Patient agrees to do so.  I did recommend that she return to clinic in 2 weeks to recheck labs again, given even though she checked labs yesterday, she had not started hydrochlorothiazide yet.  -Plan to follow-up with audiology and ENT as scheduled.  -Follow-up with me in 2 weeks if needed.      Subjective:      HPI: Gabi Sandhu is a 63 y.o. female who presents for follow-up.  Please see my previous notes for more details.  Briefly, patient has had hearing loss, vertigo, and ear pain.  At her last visit with me, she mentioned she did not have appointment with ENT until the end of July, therefore I did suggest, in addition to lifestyle changes such as decreasing sodium intake as well as alcohol intake, she could start a diuretic such as hydrochlorothiazide.  Patient was concerned about side effects, so she never started the medication.  At this point, she feels that her symptoms are still present, particularly if she turns to the left, however, also feels that she is not getting used to some of her symptoms.  Continues to note hearing loss, particularly on the left more than the right.  No fevers.  She completed her course of  antibiotics.      Medical History:     Patient Active Problem List   Diagnosis     Acid reflux     Benign essential hypertension     Hyperlipidemia, unspecified hyperlipidemia type     Type II diabetes mellitus (H)     Obesity (BMI 30-39.9)       No past medical history on file.    Past Surgical History:   Procedure Laterality Date     CHOLECYSTECTOMY       HYSTERECTOMY  1998    for dysfunctional uterine bleeding, no cancer     OOPHORECTOMY Bilateral 1998     OVARIAN CYST SURGERY       TONSILLECTOMY         Current Medications:     Current Outpatient Prescriptions   Medication Sig Note     aspirin 81 MG EC tablet Take 81 mg by mouth once daily. 9[facility]25/2015: [facility] Received Sig:      blood sugar diagnostic (ONETOUCH VERIO) Strp Apply 1 each topically 2 (two) times a day. 9/25/2015: [facility] Received Sig:      cholecalciferol, vitamin D3, 2,000 unit Tab Take 1 tablet by mouth once daily. 9/25/2015: [facility] Received Sig:      LATANOPROST OPHT Apply to eye.      lisinopril (PRINIVIL,ZESTRIL) 20 MG tablet TAKE 1 TABLET DAILY      omega-3 fatty acids-vitamin E (FISH OIL) 1,000 mg cap Take 1 tablet by mouth once daily. 9/25/2015: Received from: [facility] Received Sig:      omeprazole 20 mg TbEC Take 20 mg by mouth daily before breakfast.      simvastatin (ZOCOR) 40 MG tablet Take 1 tablet (40 mg total) by mouth at bedtime.      TIMOLOL MALEATE, PF, OPHT Apply to eye.      hydroCHLOROthiazide (HYDRODIURIL) 25 MG tablet Take 1 tablet (25 mg total) by mouth daily. (Patient not taking: Reported on 7/10/2018)      meclizine (ANTIVERT) 25 mg tablet Take 1 tablet (25 mg total) by mouth 3 (three) times a day as needed for dizziness. (Patient not taking: Reported on 7/10/2018)      metFORMIN (GLUCOPHAGE) 500 MG tablet Take 1 tablet (500 mg total) by mouth 2 (two) times a day with meals. (Patient not taking: Reported on 7/10/2018)        Family History:     Family  "History   Problem Relation Age of Onset     Diabetes Mother      Heart disease Father      CHF     Diabetes Brother      Breast cancer Paternal Aunt      early 40s     Prostate cancer Neg Hx      Cancer Neg Hx      Colon cancer Neg Hx        Review of Systems  All other systems reviewed and are negative.         Social History:     History   Smoking Status     Never Smoker   Smokeless Tobacco     Never Used     Social History     Social History Narrative         Objective:     /68 (Patient Site: Right Arm, Patient Position: Sitting, Cuff Size: Adult Regular)  Pulse 62  Resp 16  Ht 5' 4\" (1.626 m)  Wt 200 lb 4.8 oz (90.9 kg)  BMI 34.38 kg/m2  Physical Examination: General appearance - alert, well appearing, and in no distress  Eyes: pupils equal and reactive, extraocular eye movements intact  Ears: normal external ears, clear canals,  TMs appear normal, though fluid noted behind both TMs.  Mouth: mucous membranes moist, pharynx normal without lesions  Neck: supple, no significant adenopathy or thyromegaly  Lungs: clear to auscultation, no wheezes, rales or rhonchi, symmetric air entry  Heart: normal rate, regular rhythm, normal S1, S2, no murmurs.  Neurological: alert, oriented, normal speech, no focal findings or movement disorder noted.    Psychiatric: Normal affect. Does not appear anxious or depressed.    Recent Results (from the past 168 hour(s))   Electrolyte Profile   Result Value Ref Range    Sodium 138 136 - 145 mmol/L    Potassium 4.9 3.5 - 5.0 mmol/L    CO2 26 22 - 31 mmol/L    Chloride 98 98 - 107 mmol/L    Anion Gap, Calculation 14 5 - 18 mmol/L   Creatinine   Result Value Ref Range    Creatinine 0.92 0.60 - 1.10 mg/dL    GFR MDRD Af Amer >60 >60 mL/min/1.73m2    GFR MDRD Non Af Amer >60 >60 mL/min/1.73m2         Yumi Montilla MD  7/10/2018  7:47 AM        "

## 2021-06-20 NOTE — PROGRESS NOTES
HPI:  Gabi returns after thre transtympanic steroid injections.  In the interim, she notes perhaps some modest improvement to her hearing.    Past medical history, surgical history, social history, family history, medications, and allergies have been reviewed with the patient and are documented above.    Review of Systems: a 10-system review was performed. Pertinent positives are noted in the HPI and on a separate scanned document in the chart.    PHYSICAL EXAMINATION:  GEN: no acute distress, normocephalic  EYES: extraocular movements are intact, pupils are equal and round. Sclera clear.   EARS: auricles are normally formed. The external auditory canals are clear with minimal to no cerumen. Tympanic membranes are intact bilaterally with no signs of infection, effusion, retractions, or perforations.  NEURO: CN II-XII are intact bilaterally. alert and oriented. No nystagmus. Gait is normal.  PULM: breathing comfortably on room air, normal chest expansion with respiration  HEART: regular rate and rhythm, no peripheral edema    AUDIOGRAM: SLight improvement in high tones on the left with an increase in WRS from  12-24%.      MEDICAL DECISION-MAKING: Discussed her hearing loss and options including observation, CROS or BAHA.  Discussed someday she may be a candidate for CI if the indications change.  We discussed her chordoma and that it had increased in size 1 mm over almost 10 years.  We discussed another MRI in 3 years to monitor growth.  She wishes to observe for now with her hearing and will recheck in 6 months.

## 2021-06-20 NOTE — PROGRESS NOTES
"Hearing evaluation in conjunction with ENT exam (Dr. Bowers)    History:  Please see chart notes/results dated 7-25-18 and 7-31-18 for additional background information; patient experienced sudden sensorineural hearing loss in her left ear 6-15-18, and has had three transtympanic steroid injections to date, per patient report. She indicated she still hears a \"radio show\" in her left ear, with indistinct, mostly male voices and bell-like sounds present most of the time. She feels there is a slight improvement in hearing on the left, but speech clarity is still lacking.    Results:  Otoscopy was unremarkable in either ear. Hearing sensitivity was assessed with good reliability using insert phones.      Right ear:   Mild sensorineural hearing loss (by history) for 250-3000 Hz, rising to borderline normal/normal hearing sensitivity for 7891-6315 Hz.    Left ear:  Normal 250 Hz, sloping to moderate to profound sensorineural hearing loss (by history) for 500-8000 Hz.  Hearing asymmetry was noted between ears; Sahil was previously negative and not repeated today.    Air conduction thresholds were essentially stable in both ears today, per 7-31-18 audiogram.    Speech reception thresholds showed agreement with pure tone findings in each ear (speech detection threshold was present in the left ear at 55 dBHL). Word recognition ability was excellent for the right ear; continued poor for the left ear, with presentation levels significantly above typical conversational volume in both ears. There was a slight improvement noted in word recognition scores in the left ear (12% correct 7-31-18; 24% correct today).    Tympanometry was not repeated today.    Recommendations:  Follow-up with ENT; retest hearing annually per medical management or patient concern.  Wear hearing protection consistently in noise to preserve residual hearing sensitivity.  Gabi Sandhu is a potential monaural amplification candidate (right ear only) " or CROS candidate, if patient motivation exists and medical clearance is granted.    Markell Rodriguez, [facility]A  [place] Licensed Audiologist [id number]

## 2021-06-25 NOTE — TELEPHONE ENCOUNTER
Patient is way overdue for diabetes follow-up or annual wellness visit.  Please assist her with scheduling.

## 2021-06-25 NOTE — TELEPHONE ENCOUNTER
Patient is due for an AWV with labs -- please assist her with scheduling this if she doesn't already have this scheduled.

## 2021-06-25 NOTE — TELEPHONE ENCOUNTER
RN cannot approve Refill Request    RN can NOT refill this medication PCP messaged that patient is overdue for Office Visit. Last office visit: Visit date not found Last Physical: 5/4/2018 Last MTM visit: Visit date not found Last visit same specialty: 7/10/2018 Yumi Montilla MD.  Next visit within 3 mo: Visit date not found  Next physical within 3 mo: Visit date not found      Janki Carcamo, [facility] Triage/Med Refill 6/9/2021    Requested Prescriptions   Pending Prescriptions Disp Refills     metFORMIN (GLUCOPHAGE) 500 MG tablet 180 tablet 3     Sig: Take 1 tablet (500 mg total) by mouth 2 (two) times a day with meals.       Metformin Refill Protocol Failed - 6/8/2021  8:36 AM        Failed - Blood pressure in last 12 months     BP Readings from Last 1 Encounters:   07/10/18 128/68             Failed - Visit with PCP or prescribing provider visit in last 6 months or next 3 months     Last office visit with prescriber/PCP: Visit date not found OR same dept: Visit date not found OR same specialty: 7/10/2018 Yumi Montilla MD Last physical: Visit date not found Last MTM visit: Visit date not found         Next appt within 3 mo: Visit date not found  Next physical within 3 mo: Visit date not found  Prescriber OR PCP: Cora Cat MD  Last diagnosis associated with med order: 1. Type 2 diabetes mellitus with microalbuminuria, without long-term current use of insulin (H)  - metFORMIN (GLUCOPHAGE) 500 MG tablet; Take 1 tablet (500 mg total) by mouth 2 (two) times a day with meals.  Dispense: 180 tablet; Refill: 3     If protocol passes may refill for 12 months if within 3 months of last provider visit (or a total of 15 months).           Failed - A1C in last 6 months     Hemoglobin A1c   Date Value Ref Range Status   06/09/2020 7.0 (H) 3.5 - 6.0 % Final               Passed - LFT or AST or ALT in last 12 months     Albumin   Date Value Ref Range Status   06/09/2020 3.5 3.5 - 5.0 g/dL Final      Bilirubin, Total   Date Value Ref Range Status   06/09/2020 0.6 0.0 - 1.0 mg/dL Final     Alkaline Phosphatase   Date Value Ref Range Status   06/09/2020 91 45 - 120 U/L Final     AST   Date Value Ref Range Status   06/09/2020 20 0 - 40 U/L Final     ALT   Date Value Ref Range Status   06/09/2020 22 0 - 45 U/L Final     Protein, Total   Date Value Ref Range Status   06/09/2020 6.7 6.0 - 8.0 g/dL Final                Passed - GFR or Serum Creatinine in last 6 months     GFR MDRD Non Af Amer   Date Value Ref Range Status   06/09/2020 >60 >60 mL/min/1.73m2 Final     GFR MDRD Af Amer   Date Value Ref Range Status   06/09/2020 >60 >60 mL/min/1.73m2 Final             Passed - Microalbumin in last year      Microalbumin, Random Urine   Date Value Ref Range Status   06/09/2020 2.40 (H) 0.00 - 1.99 mg/dL Final

## 2021-06-25 NOTE — TELEPHONE ENCOUNTER
Left message to call back for: Gabi    Information to relay to patient:  LMTCB    Please assist patient in scheduling annual wellness visit, patient needs to be seen before further refills will be sent    Marti Fisher CMA

## 2021-06-25 NOTE — TELEPHONE ENCOUNTER
1st attempt: LVM for patient to call us back to get scheduled for a diabetic check with Dr. Cat or for a physical

## 2021-06-25 NOTE — TELEPHONE ENCOUNTER
RN cannot approve Refill Request    RN can NOT refill this medication PCP messaged that patient is overdue for Office Visit. Last office visit: Visit date not found Last Physical: 5/4/2018 Last MTM visit: Visit date not found Last visit same specialty: 7/10/2018 Yumi Montilla MD.  Next visit within 3 mo: Visit date not found  Next physical within 3 mo: Visit date not found      Ann-Marie Blankenship, [facility] Triage/Med Refill 6/11/2021    Requested Prescriptions   Pending Prescriptions Disp Refills     lisinopriL (PRINIVIL,ZESTRIL) 20 MG tablet [Pharmacy Med Name: LISINOPRIL 20 MG TABLET] 90 tablet 3     Sig: TAKE 1 TABLET BY MOUTH EVERY DAY       Ace Inhibitors Refill Protocol Failed - 6/11/2021 10:15 AM        Failed - PCP or prescribing provider visit in past 12 months       Last office visit with prescriber/PCP: Visit date not found OR same dept: Visit date not found OR same specialty: 7/10/2018 Yumi Montilla MD  Last physical: 5/4/2018 Last MTM visit: Visit date not found   Next visit within 3 mo: Visit date not found  Next physical within 3 mo: Visit date not found  Prescriber OR PCP: Cora Cat MD  Last diagnosis associated with med order: 1. Benign essential hypertension  - lisinopriL (PRINIVIL,ZESTRIL) 20 MG tablet [Pharmacy Med Name: LISINOPRIL 20 MG TABLET]; TAKE 1 TABLET BY MOUTH EVERY DAY  Dispense: 90 tablet; Refill: 3    2. Hyperlipidemia, unspecified hyperlipidemia type  - simvastatin (ZOCOR) 40 MG tablet [Pharmacy Med Name: SIMVASTATIN 40 MG TABLET]; TAKE 1 TABLET BY MOUTH EVERYDAY AT BEDTIME  Dispense: 90 tablet; Refill: 3    3. Gastroesophageal reflux disease without esophagitis  - omeprazole (PRILOSEC) 20 MG capsule [Pharmacy Med Name: OMEPRAZOLE DR 20 MG CAPSULE]; Take 1 capsule (20 mg total) by mouth daily before breakfast.  Dispense: 90 capsule; Refill: 3    If protocol passes may refill for 12 months if within 3 months of last provider visit (or a total of 15 months).              Failed - Serum Potassium in past 12 months     No results found for: LN-POTASSIUM          Failed - Blood pressure filed in past 12 months     BP Readings from Last 1 Encounters:   07/10/18 128/68             Failed - Serum Creatinine in past 12 months     Creatinine   Date Value Ref Range Status   06/09/2020 0.84 0.60 - 1.10 mg/dL Final                simvastatin (ZOCOR) 40 MG tablet [Pharmacy Med Name: SIMVASTATIN 40 MG TABLET] 90 tablet 3     Sig: TAKE 1 TABLET BY MOUTH EVERYDAY AT BEDTIME       Statins Refill Protocol (Hmg CoA Reductase Inhibitors) Failed - 6/11/2021 10:15 AM        Failed - PCP or prescribing provider visit in past 12 months      Last office visit with prescriber/PCP: Visit date not found OR same dept: Visit date not found OR same specialty: 7/10/2018 Yumi Montilla MD  Last physical: 5/4/2018 Last MTM visit: Visit date not found   Next visit within 3 mo: Visit date not found  Next physical within 3 mo: Visit date not found  Prescriber OR PCP: Cora Cat MD  Last diagnosis associated with med order: 1. Benign essential hypertension  - lisinopriL (PRINIVIL,ZESTRIL) 20 MG tablet [Pharmacy Med Name: LISINOPRIL 20 MG TABLET]; TAKE 1 TABLET BY MOUTH EVERY DAY  Dispense: 90 tablet; Refill: 3    2. Hyperlipidemia, unspecified hyperlipidemia type  - simvastatin (ZOCOR) 40 MG tablet [Pharmacy Med Name: SIMVASTATIN 40 MG TABLET]; TAKE 1 TABLET BY MOUTH EVERYDAY AT BEDTIME  Dispense: 90 tablet; Refill: 3    3. Gastroesophageal reflux disease without esophagitis  - omeprazole (PRILOSEC) 20 MG capsule [Pharmacy Med Name: OMEPRAZOLE DR 20 MG CAPSULE]; Take 1 capsule (20 mg total) by mouth daily before breakfast.  Dispense: 90 capsule; Refill: 3    If protocol passes may refill for 12 months if within 3 months of last provider visit (or a total of 15 months).                omeprazole (PRILOSEC) 20 MG capsule [Pharmacy Med Name: OMEPRAZOLE DR 20 MG CAPSULE] 90 capsule 3     Sig: TAKE 1  CAPSULE (20 MG TOTAL) BY MOUTH DAILY BEFORE BREAKFAST.       GI Medications Refill Protocol Failed - 6/11/2021 10:15 AM        Failed - PCP or prescribing provider visit in last 12 or next 3 months.     Last office visit with prescriber/PCP: Visit date not found OR same dept: Visit date not found OR same specialty: 7/10/2018 Yumi Montilla MD  Last physical: 5/4/2018 Last MTM visit: Visit date not found   Next visit within 3 mo: Visit date not found  Next physical within 3 mo: Visit date not found  Prescriber OR PCP: Cora Cat MD  Last diagnosis associated with med order: 1. Benign essential hypertension  - lisinopriL (PRINIVIL,ZESTRIL) 20 MG tablet [Pharmacy Med Name: LISINOPRIL 20 MG TABLET]; TAKE 1 TABLET BY MOUTH EVERY DAY  Dispense: 90 tablet; Refill: 3    2. Hyperlipidemia, unspecified hyperlipidemia type  - simvastatin (ZOCOR) 40 MG tablet [Pharmacy Med Name: SIMVASTATIN 40 MG TABLET]; TAKE 1 TABLET BY MOUTH EVERYDAY AT BEDTIME  Dispense: 90 tablet; Refill: 3    3. Gastroesophageal reflux disease without esophagitis  - omeprazole (PRILOSEC) 20 MG capsule [Pharmacy Med Name: OMEPRAZOLE DR 20 MG CAPSULE]; Take 1 capsule (20 mg total) by mouth daily before breakfast.  Dispense: 90 capsule; Refill: 3    If protocol passes may refill for 12 months if within 3 months of last provider visit (or a total of 15 months).

## 2021-06-26 ENCOUNTER — HEALTH MAINTENANCE LETTER (OUTPATIENT)
Age: 66
End: 2021-06-26

## 2021-07-13 ENCOUNTER — RECORDS - HEALTHEAST (OUTPATIENT)
Dept: ADMINISTRATIVE | Facility: CLINIC | Age: 66
End: 2021-07-13

## 2021-07-21 ENCOUNTER — RECORDS - HEALTHEAST (OUTPATIENT)
Dept: ADMINISTRATIVE | Facility: CLINIC | Age: 66
End: 2021-07-21

## 2021-09-03 ENCOUNTER — TRANSFERRED RECORDS (OUTPATIENT)
Dept: HEALTH INFORMATION MANAGEMENT | Facility: CLINIC | Age: 66
End: 2021-09-03

## 2021-09-03 LAB — RETINOPATHY: NEGATIVE

## 2021-09-22 ENCOUNTER — TELEPHONE (OUTPATIENT)
Dept: LAB | Facility: CLINIC | Age: 66
End: 2021-09-22

## 2021-09-22 ENCOUNTER — MYC MEDICAL ADVICE (OUTPATIENT)
Dept: GENERAL RADIOLOGY | Facility: CLINIC | Age: 66
End: 2021-09-22

## 2021-09-22 NOTE — PROGRESS NOTES
Pt has appointment 9/23 for labs for prescription renewal.  No orders are in the chart.    Please place orders or advise pt.    Thanks

## 2021-09-22 NOTE — TELEPHONE ENCOUNTER
Attempted call. No voicemail. Canceled lab appt at [place]. Placed notes in visit that lab was being canceled. Will send [employer] message.

## 2021-10-14 DIAGNOSIS — E78.5 HYPERLIPIDEMIA, UNSPECIFIED HYPERLIPIDEMIA TYPE: ICD-10-CM

## 2021-10-14 DIAGNOSIS — R80.9 TYPE 2 DIABETES MELLITUS WITH MICROALBUMINURIA, WITHOUT LONG-TERM CURRENT USE OF INSULIN (H): ICD-10-CM

## 2021-10-14 DIAGNOSIS — I10 BENIGN ESSENTIAL HYPERTENSION: ICD-10-CM

## 2021-10-14 DIAGNOSIS — E11.29 TYPE 2 DIABETES MELLITUS WITH MICROALBUMINURIA, WITHOUT LONG-TERM CURRENT USE OF INSULIN (H): ICD-10-CM

## 2021-10-14 RX ORDER — SIMVASTATIN 40 MG
TABLET ORAL
Qty: 30 TABLET | Refills: 0 | OUTPATIENT
Start: 2021-10-14

## 2021-10-14 RX ORDER — LISINOPRIL 20 MG/1
TABLET ORAL
Qty: 90 TABLET | Refills: 0 | OUTPATIENT
Start: 2021-10-14

## 2021-10-14 NOTE — TELEPHONE ENCOUNTER
Patient apologizes she has not been able to come in.  When she signed up for [employer], she did not realize that [place] was not accepting it any more.  She did look into other clinics in the area in her plan and they had terrible reviews.  She really does not want to go anywhere else.  She is kind of waiting for open enrollment so she can change insurances to get back and see you.      She is going to work on changing her insurance, but it would not be effective until the first of the year.  I advised her to make appt now for early jan so she is on the books when her new insurance kicks in.      Would you be willing to refill til then?

## 2021-10-14 NOTE — TELEPHONE ENCOUNTER
"Routing refill request to provider for review/approval because:  Labs not current:  Multiple   Patient needs to be seen because it has been more than 1 year since last office visit.    Last Written Prescription Date:  6/9/21  Last Fill Quantity: 180,  # refills: 0   Last office visit provider:  4/27/20    Last Written Prescription Date:  6/12/21  Last Fill Quantity: 90,  # refills: 0   Last office visit provider:  4/27/20    Last Written Prescription Date:  9/24/21  Last Fill Quantity: 30,  # refills: 0   Last office visit provider:  4/27/20     Requested Prescriptions   Pending Prescriptions Disp Refills     metFORMIN (GLUCOPHAGE) 500 MG tablet [Pharmacy Med Name: METFORMIN  MG TABLET] 180 tablet 0     Sig: TAKE 1 TABLET (500 MG TOTAL) BY MOUTH 2 (TWO) TIMES A DAY WITH MEALS.-PT OVERDUE FOR MD VISIT       Biguanide Agents Failed - 10/14/2021  1:32 AM        Failed - Patient has documented A1c within the specified period of time.     If HgbA1C is 8 or greater, it needs to be on file within the past 3 months.  If less than 8, must be on file within the past 6 months.     Recent Labs   Lab Test 06/09/20  0920   A1C 7.0*             Failed - Patient's CR is NOT>1.4 OR Patient's EGFR is NOT<45 within past 12 mos.     Recent Labs   Lab Test 06/09/20  0920   GFRESTIMATED >60   GFRESTBLACK >60       Recent Labs   Lab Test 06/09/20  0920   CR 0.84             Failed - Recent (6 mo) or future (30 days) visit within the authorizing provider's specialty     Patient had office visit in the last 6 months or has a visit in the next 30 days with authorizing provider or within the authorizing provider's specialty.  See \"Patient Info\" tab in inbasket, or \"Choose Columns\" in Meds & Orders section of the refill encounter.            Passed - Patient is age 10 or older        Passed - Patient does NOT have a diagnosis of CHF.        Passed - Medication is active on med list        Passed - Patient is not pregnant        Passed - " "Patient has not had a positive pregnancy test within the past 12 mos.            lisinopril (ZESTRIL) 20 MG tablet [Pharmacy Med Name: LISINOPRIL 20 MG TABLET] 90 tablet 0     Sig: TAKE 1 TABLET BY MOUTH EVERY DAY       ACE Inhibitors (Including Combos) Protocol Failed - 10/14/2021  1:32 AM        Failed - Blood pressure under 140/90 in past 12 months     BP Readings from Last 3 Encounters:   No data found for BP                 Failed - Recent (12 mo) or future (30 days) visit within the authorizing provider's specialty     Patient has had an office visit with the authorizing provider or a provider within the authorizing providers department within the previous 12 mos or has a future within next 30 days. See \"Patient Info\" tab in inbasket, or \"Choose Columns\" in Meds & Orders section of the refill encounter.              Failed - Normal serum creatinine on file in past 12 months     Recent Labs   Lab Test 06/09/20  0920   CR 0.84       Ok to refill medication if creatinine is low          Failed - Normal serum potassium on file in past 12 months     Recent Labs   Lab Test 06/09/20  0920   POTASSIUM 4.2             Passed - Medication is active on med list        Passed - Patient is age 18 or older        Passed - No active pregnancy on record        Passed - No positive pregnancy test within past 12 months           simvastatin (ZOCOR) 40 MG tablet [Pharmacy Med Name: SIMVASTATIN 40 MG TABLET] 30 tablet 0     Sig: TAKE 1 TABLET BY MOUTH EVERYDAY AT BEDTIME       Statins Protocol Failed - 10/14/2021  1:32 AM        Failed - LDL on file in past 12 months     Recent Labs   Lab Test 06/09/20  0920   *             Failed - Recent (12 mo) or future (30 days) visit within the authorizing provider's specialty     Patient has had an office visit with the authorizing provider or a provider within the authorizing providers department within the previous 12 mos or has a future within next 30 days. See \"Patient Info\" tab " "in inbasket, or \"Choose Columns\" in Meds & Orders section of the refill encounter.              Passed - No abnormal creatine kinase in past 12 months     No lab results found.             Passed - Medication is active on med list        Passed - Patient is age 18 or older        Passed - No active pregnancy on record        Passed - No positive pregnancy test in past 12 months             Colton Mcnamara RN 10/14/21 1:12 PM  "

## 2021-10-14 NOTE — TELEPHONE ENCOUNTER
Patient is way overdue for a visit and labs.  I had noted previously that she needed to be seen prior to further refills and sent 30 days.  Does she have an appointment scheduled elsewhere?  It appears her insurance no longer covers our system

## 2021-10-16 ENCOUNTER — HEALTH MAINTENANCE LETTER (OUTPATIENT)
Age: 66
End: 2021-10-16

## 2021-10-30 DIAGNOSIS — E11.29 TYPE 2 DIABETES MELLITUS WITH MICROALBUMINURIA, WITHOUT LONG-TERM CURRENT USE OF INSULIN (H): ICD-10-CM

## 2021-10-30 DIAGNOSIS — I10 BENIGN ESSENTIAL HYPERTENSION: ICD-10-CM

## 2021-10-30 DIAGNOSIS — E78.5 HYPERLIPIDEMIA, UNSPECIFIED HYPERLIPIDEMIA TYPE: ICD-10-CM

## 2021-10-30 DIAGNOSIS — R80.9 TYPE 2 DIABETES MELLITUS WITH MICROALBUMINURIA, WITHOUT LONG-TERM CURRENT USE OF INSULIN (H): ICD-10-CM

## 2021-10-31 NOTE — TELEPHONE ENCOUNTER
"Routing refill request to provider for review/approval because:  Labs not current:  LDL, creatinine, potassium  Patient needs to be seen because it has been more than 1 year since last office visit.    Simvastatin Last Written Prescription Date:  9/24/2021  Last Fill Quantity: 30,  # refills: 0   Last office visit provider:  4/27/2020 Karmen Cat MD     Lisinopril Last Written Prescription Date:  6/12/2021  Last Fill Quantity: 90,  # refills: 0   Last office visit provider:  4/27/2020 Karmen Cat MD     Metformin Last Written Prescription Date:  6/9/2021  Last Fill Quantity: 180,  # refills: 0   Last office visit provider:  4/27/2020 Karmen Cat MD     lisinopriL (PRINIVIL,ZESTRIL) 20 MG tablet 90 tablet 0 6/12/2021  No   Sig: TAKE 1 TABLET BY MOUTH EVERY DAY   Sent to pharmacy as: lisinopriL 20 mg tablet (PRINIVIL,ZESTRIL)   E-Prescribing Status: Receipt confirmed by pharmacy (6/12/2021  9:05 AM CDT     metFORMIN (GLUCOPHAGE) 500 MG tablet 180 tablet 0 6/9/2021  No   Sig - Route: Take 1 tablet (500 mg total) by mouth 2 (two) times a day with meals. - Oral   Sent to pharmacy as: metFORMIN 500 mg tablet (GLUCOPHAGE)   Notes to Pharmacy: Patient overdue for MD visit   E-Prescribing Status: Receipt confirmed by pharmacy (6/9/2021 12:19 PM CDT         Requested Prescriptions   Pending Prescriptions Disp Refills     simvastatin (ZOCOR) 40 MG tablet [Pharmacy Med Name: SIMVASTATIN 40 MG TABLET] 30 tablet 0     Sig: TAKE 1 TABLET BY MOUTH EVERYDAY AT BEDTIME       Statins Protocol Failed - 10/30/2021  6:52 AM        Failed - LDL on file in past 12 months     Recent Labs   Lab Test 06/09/20  0920   *             Failed - Recent (12 mo) or future (30 days) visit within the authorizing provider's specialty     Patient has had an office visit with the authorizing provider or a provider within the authorizing providers department within the previous 12 mos or has a future within next 30 days. See \"Patient " "Info\" tab in inbasket, or \"Choose Columns\" in Meds & Orders section of the refill encounter.              Passed - No abnormal creatine kinase in past 12 months     No lab results found.             Passed - Medication is active on med list        Passed - Patient is age 18 or older        Passed - No active pregnancy on record        Passed - No positive pregnancy test in past 12 months           lisinopril (ZESTRIL) 20 MG tablet [Pharmacy Med Name: LISINOPRIL 20 MG TABLET] 90 tablet 0     Sig: TAKE 1 TABLET BY MOUTH EVERY DAY       ACE Inhibitors (Including Combos) Protocol Failed - 10/30/2021  6:52 AM        Failed - Blood pressure under 140/90 in past 12 months     BP Readings from Last 3 Encounters:   No data found for BP                 Failed - Recent (12 mo) or future (30 days) visit within the authorizing provider's specialty     Patient has had an office visit with the authorizing provider or a provider within the authorizing providers department within the previous 12 mos or has a future within next 30 days. See \"Patient Info\" tab in [employer], or \"Choose Columns\" in Meds & Orders section of the refill encounter.              Failed - Normal serum creatinine on file in past 12 months     Recent Labs   Lab Test 06/09/20  0920   CR 0.84       Ok to refill medication if creatinine is low          Failed - Normal serum potassium on file in past 12 months     Recent Labs   Lab Test 06/09/20  0920   POTASSIUM 4.2             Passed - Medication is active on med list        Passed - Patient is age 18 or older        Passed - No active pregnancy on record        Passed - No positive pregnancy test within past 12 months           metFORMIN (GLUCOPHAGE) 500 MG tablet [Pharmacy Med Name: METFORMIN  MG TABLET] 180 tablet 0     Sig: TAKE 1 TABLET (500 MG TOTAL) BY MOUTH 2 (TWO) TIMES A DAY WITH MEALS.-PT OVERDUE FOR MD VISIT       Biguanide Agents Failed - 10/30/2021  6:52 AM        Failed - Patient has " "documented A1c within the specified period of time.     If HgbA1C is 8 or greater, it needs to be on file within the past 3 months.  If less than 8, must be on file within the past 6 months.     Recent Labs   Lab Test 06/09/20 0920   A1C 7.0*             Failed - Patient's CR is NOT>1.4 OR Patient's EGFR is NOT<45 within past 12 mos.     Recent Labs   Lab Test 06/09/20 0920   GFRESTIMATED >60   GFRESTBLACK >60       Recent Labs   Lab Test 06/09/20 0920   CR 0.84             Failed - Recent (6 mo) or future (30 days) visit within the authorizing provider's specialty     Patient had office visit in the last 6 months or has a visit in the next 30 days with authorizing provider or within the authorizing provider's specialty.  See \"Patient Info\" tab in inbasket, or \"Choose Columns\" in Meds & Orders section of the refill encounter.            Passed - Patient is age 10 or older        Passed - Patient does NOT have a diagnosis of CHF.        Passed - Medication is active on med list        Passed - Patient is not pregnant        Passed - Patient has not had a positive pregnancy test within the past 12 mos.              Sheba Alcantar RN 10/31/21 4:43 PM  "

## 2021-11-01 RX ORDER — SIMVASTATIN 40 MG
TABLET ORAL
Qty: 30 TABLET | Refills: 0 | OUTPATIENT
Start: 2021-11-01

## 2021-11-01 RX ORDER — LISINOPRIL 20 MG/1
TABLET ORAL
Qty: 90 TABLET | Refills: 0 | OUTPATIENT
Start: 2021-11-01

## 2021-11-01 NOTE — TELEPHONE ENCOUNTER
Incoming call from patient stating that we can disregard refill requests. Pt spoke to [facility] about not sending it to us anymore and she is not using [facility] anymore. Pt stated she knows we are not in her insurance network and she is getting a new insurance next year. Pt gets the refills notifications as well. Pt would like us to just disregard cancel request.

## 2021-11-01 NOTE — TELEPHONE ENCOUNTER
See previous messages, we are not covered by patient's insurance and she refuses to see a provider who is.  She needs an appt, I cannot keep refilling her meds without labs -- I recommend she see a covered provider once until she can change insurance.  She really needs labs to maintain her meds.

## 2022-01-20 ASSESSMENT — ACTIVITIES OF DAILY LIVING (ADL): CURRENT_FUNCTION: NO ASSISTANCE NEEDED

## 2022-01-27 ENCOUNTER — OFFICE VISIT (OUTPATIENT)
Dept: INTERNAL MEDICINE | Facility: CLINIC | Age: 67
End: 2022-01-27
Payer: COMMERCIAL

## 2022-01-27 VITALS
WEIGHT: 201.6 LBS | HEIGHT: 63 IN | TEMPERATURE: 98.9 F | BODY MASS INDEX: 35.72 KG/M2 | DIASTOLIC BLOOD PRESSURE: 78 MMHG | SYSTOLIC BLOOD PRESSURE: 122 MMHG | OXYGEN SATURATION: 97 % | HEART RATE: 74 BPM | RESPIRATION RATE: 18 BRPM

## 2022-01-27 DIAGNOSIS — K21.9 GASTROESOPHAGEAL REFLUX DISEASE WITHOUT ESOPHAGITIS: ICD-10-CM

## 2022-01-27 DIAGNOSIS — Z00.00 ENCOUNTER FOR MEDICARE ANNUAL WELLNESS EXAM: Primary | ICD-10-CM

## 2022-01-27 DIAGNOSIS — E66.9 OBESITY (BMI 30-39.9): ICD-10-CM

## 2022-01-27 DIAGNOSIS — Z12.31 ENCOUNTER FOR SCREENING MAMMOGRAM FOR BREAST CANCER: ICD-10-CM

## 2022-01-27 DIAGNOSIS — I10 BENIGN ESSENTIAL HYPERTENSION: ICD-10-CM

## 2022-01-27 DIAGNOSIS — E66.01 MORBID OBESITY (H): ICD-10-CM

## 2022-01-27 DIAGNOSIS — E78.5 HYPERLIPIDEMIA, UNSPECIFIED HYPERLIPIDEMIA TYPE: ICD-10-CM

## 2022-01-27 DIAGNOSIS — R39.9 URINARY SYMPTOM OR SIGN: ICD-10-CM

## 2022-01-27 DIAGNOSIS — E11.9 TYPE 2 DIABETES MELLITUS WITHOUT COMPLICATION, WITHOUT LONG-TERM CURRENT USE OF INSULIN (H): ICD-10-CM

## 2022-01-27 DIAGNOSIS — Z12.11 SPECIAL SCREENING FOR MALIGNANT NEOPLASMS, COLON: ICD-10-CM

## 2022-01-27 DIAGNOSIS — Z78.0 POSTMENOPAUSAL STATUS: ICD-10-CM

## 2022-01-27 LAB
ALBUMIN SERPL-MCNC: 3.6 G/DL (ref 3.5–5)
ALBUMIN UR-MCNC: 100 MG/DL
ALP SERPL-CCNC: 95 U/L (ref 45–120)
ALT SERPL W P-5'-P-CCNC: 57 U/L (ref 0–45)
ANION GAP SERPL CALCULATED.3IONS-SCNC: 12 MMOL/L (ref 5–18)
APPEARANCE UR: CLEAR
AST SERPL W P-5'-P-CCNC: 119 U/L (ref 0–40)
BACTERIA #/AREA URNS HPF: ABNORMAL /HPF
BASOPHILS # BLD AUTO: 0 10E3/UL (ref 0–0.2)
BASOPHILS NFR BLD AUTO: 0 %
BILIRUB SERPL-MCNC: 0.9 MG/DL (ref 0–1)
BILIRUB UR QL STRIP: ABNORMAL
BUN SERPL-MCNC: 12 MG/DL (ref 8–22)
CALCIUM SERPL-MCNC: 9.7 MG/DL (ref 8.5–10.5)
CHLORIDE BLD-SCNC: 99 MMOL/L (ref 98–107)
CHOLEST SERPL-MCNC: 243 MG/DL
CO2 SERPL-SCNC: 26 MMOL/L (ref 22–31)
COLOR UR AUTO: YELLOW
CREAT SERPL-MCNC: 1.09 MG/DL (ref 0.6–1.1)
EOSINOPHIL # BLD AUTO: 0.1 10E3/UL (ref 0–0.7)
EOSINOPHIL NFR BLD AUTO: 2 %
ERYTHROCYTE [DISTWIDTH] IN BLOOD BY AUTOMATED COUNT: 12.7 % (ref 10–15)
FASTING STATUS PATIENT QL REPORTED: ABNORMAL
GFR SERPL CREATININE-BSD FRML MDRD: 55 ML/MIN/1.73M2
GLUCOSE BLD-MCNC: 231 MG/DL (ref 70–125)
GLUCOSE UR STRIP-MCNC: NEGATIVE MG/DL
HBA1C MFR BLD: 8.9 % (ref 0–5.6)
HCT VFR BLD AUTO: 45 % (ref 35–47)
HDLC SERPL-MCNC: 40 MG/DL
HGB BLD-MCNC: 15.6 G/DL (ref 11.7–15.7)
HGB UR QL STRIP: ABNORMAL
IMM GRANULOCYTES # BLD: 0 10E3/UL
IMM GRANULOCYTES NFR BLD: 0 %
KETONES UR STRIP-MCNC: 15 MG/DL
LDLC SERPL CALC-MCNC: 157 MG/DL
LEUKOCYTE ESTERASE UR QL STRIP: ABNORMAL
LYMPHOCYTES # BLD AUTO: 2.5 10E3/UL (ref 0.8–5.3)
LYMPHOCYTES NFR BLD AUTO: 35 %
MCH RBC QN AUTO: 33.3 PG (ref 26.5–33)
MCHC RBC AUTO-ENTMCNC: 34.7 G/DL (ref 31.5–36.5)
MCV RBC AUTO: 96 FL (ref 78–100)
MONOCYTES # BLD AUTO: 0.3 10E3/UL (ref 0–1.3)
MONOCYTES NFR BLD AUTO: 4 %
NEUTROPHILS # BLD AUTO: 4.2 10E3/UL (ref 1.6–8.3)
NEUTROPHILS NFR BLD AUTO: 59 %
NITRATE UR QL: NEGATIVE
PH UR STRIP: 5 [PH] (ref 5–8)
PLATELET # BLD AUTO: 214 10E3/UL (ref 150–450)
POTASSIUM BLD-SCNC: 4.1 MMOL/L (ref 3.5–5)
PROT SERPL-MCNC: 6.8 G/DL (ref 6–8)
RBC # BLD AUTO: 4.69 10E6/UL (ref 3.8–5.2)
RBC #/AREA URNS AUTO: ABNORMAL /HPF
SODIUM SERPL-SCNC: 137 MMOL/L (ref 136–145)
SP GR UR STRIP: 1.02 (ref 1–1.03)
SQUAMOUS #/AREA URNS AUTO: ABNORMAL /LPF
TRIGL SERPL-MCNC: 232 MG/DL
UROBILINOGEN UR STRIP-ACNC: 1 E.U./DL
WBC # BLD AUTO: 7.1 10E3/UL (ref 4–11)
WBC #/AREA URNS AUTO: ABNORMAL /HPF

## 2022-01-27 PROCEDURE — 87186 SC STD MICRODIL/AGAR DIL: CPT | Performed by: NURSE PRACTITIONER

## 2022-01-27 PROCEDURE — 80061 LIPID PANEL: CPT | Performed by: NURSE PRACTITIONER

## 2022-01-27 PROCEDURE — 81001 URINALYSIS AUTO W/SCOPE: CPT | Performed by: NURSE PRACTITIONER

## 2022-01-27 PROCEDURE — 87086 URINE CULTURE/COLONY COUNT: CPT | Performed by: NURSE PRACTITIONER

## 2022-01-27 PROCEDURE — 83036 HEMOGLOBIN GLYCOSYLATED A1C: CPT | Performed by: NURSE PRACTITIONER

## 2022-01-27 PROCEDURE — 99397 PER PM REEVAL EST PAT 65+ YR: CPT | Performed by: NURSE PRACTITIONER

## 2022-01-27 PROCEDURE — 85025 COMPLETE CBC W/AUTO DIFF WBC: CPT | Performed by: NURSE PRACTITIONER

## 2022-01-27 PROCEDURE — 36415 COLL VENOUS BLD VENIPUNCTURE: CPT | Performed by: NURSE PRACTITIONER

## 2022-01-27 PROCEDURE — 80053 COMPREHEN METABOLIC PANEL: CPT | Performed by: NURSE PRACTITIONER

## 2022-01-27 RX ORDER — LISINOPRIL 20 MG/1
TABLET ORAL
Qty: 90 TABLET | Refills: 1 | Status: SHIPPED | OUTPATIENT
Start: 2022-01-27 | End: 2022-08-03

## 2022-01-27 ASSESSMENT — ACTIVITIES OF DAILY LIVING (ADL): CURRENT_FUNCTION: NO ASSISTANCE NEEDED

## 2022-01-27 ASSESSMENT — MIFFLIN-ST. JEOR: SCORE: 1419.53

## 2022-01-27 NOTE — PATIENT INSTRUCTIONS
Patient Education   Personalized Prevention Plan  You are due for the preventive services outlined below.  Your care team is available to assist you in scheduling these services.  If you have already completed any of these items, please share that information with your care team to update in your medical record.  Health Maintenance Due   Topic Date Due     Osteoporosis Screening  Never done     Diabetic Foot Exam  Never done     ANNUAL REVIEW OF HM ORDERS  Never done     Discuss Advance Care Planning  Never done     Colorectal Cancer Screening  Never done     Hepatitis C Screening  Never done     FALL RISK ASSESSMENT  Never done     Pneumococcal Vaccine (1 of 1 - PPSV23) Never done     Annual Wellness Visit  05/04/2019     Mammogram  11/11/2019     A1C Lab  12/09/2020     Basic Metabolic Panel  06/09/2021     Cholesterol Lab  06/09/2021     Kidney Microalbumin Urine Test  06/09/2021

## 2022-01-27 NOTE — PROGRESS NOTES
"SUBJECTIVE:   Gabi Sandhu is a 67 year old female who presents for Preventive Visit.      Patient has been advised of split billing requirements and indicates understanding: Yes  Are you in the first 12 months of your Medicare coverage?  Yes, but has recent eye exam on file with us from November.      Healthy Habits:     In general, how would you rate your overall health?  Good    Frequency of exercise:  None    Do you usually eat at least 4 servings of fruit and vegetables a day, include whole grains    & fiber and avoid regularly eating high fat or \"junk\" foods?  No    Taking medications regularly:  No    Barriers to taking medications:  Other    Medication side effects:  Other    Ability to successfully perform activities of daily living:  No assistance needed    Home Safety:  No safety concerns identified    Hearing Impairment:  No hearing concerns    In the past 6 months, have you been bothered by leaking of urine?  No    In general, how would you rate your overall mental or emotional health?  Good      PHQ-2 Total Score: 0    Additional concerns today:  No    Do you feel safe in your environment? Yes    Have you ever done Advance Care Planning? (For example, a Health Directive, POLST, or a discussion with a medical provider or your loved ones about your wishes): Yes, patient states has an Advance Care Planning document and will bring a copy to the clinic.       Fall risk   Fallen 2 or more times in the past year?: No  Any fall with injury in the past year?: No    Cognitive Screening   1) Repeat 3 items (Leader, Season, Table)    2) Clock draw: ABNORMAL   3) 3 item recall: Recalls 3 objects  Results: 3 items recalled: COGNITIVE IMPAIRMENT LESS LIKELY    Mini-CogTM Copyright ERINN Ball. Licensed by the author for use in [facility]; reprinted with permission (torres@.[email or web address]). All rights reserved.      Do you have sleep apnea, excessive snoring or daytime drowsiness?: no    Reviewed and updated as " needed this visit by clinical staff  Tobacco  Allergies  Meds             Reviewed and updated as needed this visit by Provider               Social History     Tobacco Use     Smoking status: Never Smoker     Smokeless tobacco: Never Used   Substance Use Topics     Alcohol use: Yes     Alcohol/week: 12.0 - 15.0 standard drinks     Would like to discuss colonoscopy.    Current providers sharing in care for this patient include:   Patient Care Team:  Cora Cat MD as PCP - General  Cora Cat MD as Assigned PCP    The following health maintenance items are reviewed in Epic and correct as of today:  Health Maintenance Due   Topic Date Due     DEXA  Never done     DIABETIC FOOT EXAM  Never done     ANNUAL REVIEW OF HM ORDERS  Never done     ADVANCE CARE PLANNING  Never done     COLORECTAL CANCER SCREENING  Never done     HEPATITIS C SCREENING  Never done     FALL RISK ASSESSMENT  Never done     Pneumococcal Vaccine: 65+ Years (1 of 1 - PPSV23) Never done     MEDICARE ANNUAL WELLNESS VISIT  05/04/2019     MAMMO SCREENING  11/11/2019     A1C  12/09/2020     BMP  06/09/2021     LIPID  06/09/2021     MICROALBUMIN  06/09/2021     BP Readings from Last 3 Encounters:   01/27/22 122/78    Wt Readings from Last 3 Encounters:   01/27/22 91.4 kg (201 lb 9.6 oz)   07/10/18 90.9 kg (200 lb 4.8 oz)   06/25/18 90.7 kg (200 lb)                  Patient Active Problem List   Diagnosis     Acid reflux     Benign essential hypertension     Hyperlipidemia, unspecified hyperlipidemia type     Type II diabetes mellitus (H)     Obesity (BMI 30-39.9)     Sudden hearing loss, left     Morbid obesity (H)     Past Surgical History:   Procedure Laterality Date     CHOLECYSTECTOMY       HYSTERECTOMY  1998    for dysfunctional uterine bleeding, no cancer     OOPHORECTOMY Bilateral 1998     OVARIAN CYST SURGERY       TONSILLECTOMY         Social History     Tobacco Use     Smoking status: Never Smoker     Smokeless tobacco:  Never Used   Substance Use Topics     Alcohol use: Yes     Alcohol/week: 12.0 - 15.0 standard drinks     Comment: 6-7 per week     Family History   Problem Relation Age of Onset     Diabetes Mother      Heart Disease Father         CHF     Diabetes Brother      Breast Cancer Paternal Aunt         early 40s     Prostate Cancer No family hx of      Cancer No family hx of      Colon Cancer No family hx of          Current Outpatient Medications   Medication Sig Dispense Refill     aspirin 81 MG EC tablet [ASPIRIN 81 MG EC TABLET] Take 81 mg by mouth once daily.       blood sugar diagnostic (ONETOUCH VERIO) Strp [BLOOD SUGAR DIAGNOSTIC (ONETOUCH VERIO) STRP] Apply 1 each topically 2 (two) times a day.       cholecalciferol, vitamin D3, 2,000 unit Tab [CHOLECALCIFEROL, VITAMIN D3, 2,000 UNIT TAB] Take 1 tablet by mouth once daily.       lisinopril (ZESTRIL) 20 MG tablet [LISINOPRIL (PRINIVIL,ZESTRIL) 20 MG TABLET] TAKE 1 TABLET BY MOUTH EVERY DAY 90 tablet 1     omeprazole (PRILOSEC) 20 MG DR capsule TAKE 1 CAPSULE (20 MG TOTAL) BY MOUTH DAILY BEFORE BREAKFAST. 90 capsule 1     timolol maleate (TIMOPTIC) 0.5 % ophthalmic solution [TIMOLOL MALEATE (TIMOPTIC) 0.5 % OPHTHALMIC SOLUTION] APPLY 1 DROP IN BOTH EYES ONCE IN THE MORNING  3     Allergies   Allergen Reactions     Erythromycin Rash     Recent Labs   Lab Test 01/27/22  1235 06/09/20  0920 07/09/18  0753 05/04/18  0805 11/11/16  0732 11/11/16  0732   A1C 8.9* 7.0*  --  10.8*  --  10.0*   LDL  --  164*  --  207*  --  139*   HDL  --  46*  --  50  --  37*   TRIG  --  154*  --  187*  --  169*   ALT  --  22  --  36  --   --    CR  --  0.84 0.92 0.81   < >  --    GFRESTIMATED  --  >60 >60 >60   < >  --    GFRESTBLACK  --  >60 >60 >60   < >  --    POTASSIUM  --  4.2 4.9 4.5   < >  --     < > = values in this interval not displayed.      Mammogram Screening: Mammogram Screening: Recommended mammography every 1-2 years with patient discussion and risk factor  "consideration  Last 3 Pap and HPV Results:    Any new diagnosis of family breast, ovarian, or bowel cancer? No    FHS-7: No flowsheet data found.      Pertinent mammograms are reviewed under the imaging tab.    Review of Systems  Unremarkable listed above below    OBJECTIVE:   /78 (BP Location: Left arm, Patient Position: Sitting, Cuff Size: Adult Small)   Pulse 74   Temp 98.9  F (37.2  C) (Temporal)   Resp 18   Ht 1.61 m (5' 3.38\")   Wt 91.4 kg (201 lb 9.6 oz)   SpO2 97%   BMI 35.29 kg/m   Estimated body mass index is 35.29 kg/m  as calculated from the following:    Height as of this encounter: 1.61 m (5' 3.38\").    Weight as of this encounter: 91.4 kg (201 lb 9.6 oz).  Physical Exam  GENERAL APPEARANCE: healthy, alert and no distress  EYES: Eyes grossly normal to inspection, PERRL and conjunctivae and sclerae normal  HENT: ear canals and TM's normal, nose and mouth without ulcers or lesions, oropharynx clear and oral mucous membranes moist  NECK: no adenopathy, no asymmetry, masses, or scars and thyroid normal to palpation  RESP: lungs clear to auscultation - no rales, rhonchi or wheezes  CV: regular rate and rhythm, normal S1 S2, n no peripheral edema and peripheral pulses strong  ABDOMEN: soft, nontender, no hepatosplenomegaly, no masses and bowel sounds normal  MS: no musculoskeletal defects are noted and gait is age appropriate without ataxia  SKIN: no suspicious lesions or rashes  NEURO: Normal strength and tone, sensory exam grossly normal, mentation intact and speech normal  PSYCH: mentation appears normal and affect normal/bright        ASSESSMENT / PLAN:       ICD-10-CM    1. Encounter for Medicare annual wellness exam  Z00.00    2. Benign essential hypertension  I10 lisinopril (ZESTRIL) 20 MG tablet   3. Gastroesophageal reflux disease without esophagitis  K21.9 omeprazole (PRILOSEC) 20 MG DR capsule   4. Type 2 diabetes mellitus without complication, without long-term current use of insulin " "(H)  E11.9 Comprehensive metabolic panel     CBC with platelets and differential     UA Macro with Reflex to Micro and Culture - lab collect     Hemoglobin A1c     Comprehensive metabolic panel     CBC with platelets and differential     Hemoglobin A1c     UA Macro with Reflex to Micro and Culture - lab collect     Urine Microscopic Exam     Urine Culture   5. Obesity (BMI 30-39.9)  E66.9    6. Hyperlipidemia, unspecified hyperlipidemia type  E78.5 Lipid Profile (Chol, Trig, HDL, LDL calc)     Lipid Profile (Chol, Trig, HDL, LDL calc)   7. Special screening for malignant neoplasms, colon  Z12.11 Adult Gastro Ref - Procedure Only   8. Encounter for screening mammogram for breast cancer  Z12.31 *MA Screening Digital Bilateral   9. Postmenopausal status  Z78.0 DX Hip/Pelvis/Spine     DX Hip/Pelvis/Spine   10. Morbid obesity (H)  E66.01      Patient indicated that she was having some side effects of constipation with utilization of the Metformin and a statin so she discontinued she is in agreement with restarting statin will await for hemoglobin A1c and make a determination whether to restart the med or alternative medication or just continue lifestyle changes.  Recommend 30 minutes of purposeful physical activity most days of the week weight loss is encouraged.  Patient will be establishing with a new provider in the upcoming months.      COUNSELING:  Reviewed preventive health counseling, as reflected in patient instructions       Regular exercise       Healthy diet/nutrition       Vision screening       Dental care       Osteoporosis prevention/bone health       Colon cancer screening    Estimated body mass index is 35.29 kg/m  as calculated from the following:    Height as of this encounter: 1.61 m (5' 3.38\").    Weight as of this encounter: 91.4 kg (201 lb 9.6 oz).    Weight management plan: Discussed healthy diet and exercise guidelines    She reports that she has never smoked. She has never used smokeless " tobacco.      Appropriate preventive services were discussed with this patient, including applicable screening as appropriate for cardiovascular disease, diabetes, osteopenia/osteoporosis, and glaucoma.  As appropriate for age/gender, discussed screening for colorectal cancer, prostate cancer, breast cancer, and cervical cancer. Checklist reviewing preventive services available has been given to the patient.    Reviewed patients plan of care and provided an AVS. The Basic Care Plan (routine screening as documented in Health Maintenance) for Gabi meets the Care Plan requirement. This Care Plan has been established and reviewed with the Patient.    Counseling Resources:  ATP IV Guidelines  Pooled Cohorts Equation Calculator  Breast Cancer Risk Calculator  Breast Cancer: Medication to Reduce Risk  FRAX Risk Assessment  ICSI Preventive Guidelines  Dietary Guidelines for Americans, 2010  USDA's MyPlate  ASA Prophylaxis  Lung CA Screening    Itzel Arauz NP  [facility]    Identified Health Risks:

## 2022-01-28 RX ORDER — NITROFURANTOIN 25; 75 MG/1; MG/1
100 CAPSULE ORAL 2 TIMES DAILY
Qty: 20 CAPSULE | Refills: 0 | Status: SHIPPED | OUTPATIENT
Start: 2022-01-28 | End: 2022-02-07

## 2022-01-30 LAB
BACTERIA UR CULT: ABNORMAL
BACTERIA UR CULT: ABNORMAL

## 2022-01-31 ENCOUNTER — MYC MEDICAL ADVICE (OUTPATIENT)
Dept: INTERNAL MEDICINE | Facility: CLINIC | Age: 67
End: 2022-01-31
Payer: COMMERCIAL

## 2022-01-31 DIAGNOSIS — E78.5 HYPERLIPIDEMIA, UNSPECIFIED HYPERLIPIDEMIA TYPE: ICD-10-CM

## 2022-01-31 DIAGNOSIS — E11.29 TYPE 2 DIABETES MELLITUS WITH MICROALBUMINURIA, WITHOUT LONG-TERM CURRENT USE OF INSULIN (H): ICD-10-CM

## 2022-01-31 DIAGNOSIS — R80.9 TYPE 2 DIABETES MELLITUS WITH MICROALBUMINURIA, WITHOUT LONG-TERM CURRENT USE OF INSULIN (H): ICD-10-CM

## 2022-01-31 RX ORDER — SIMVASTATIN 40 MG
TABLET ORAL
Qty: 30 TABLET | Refills: 0 | Status: SHIPPED | OUTPATIENT
Start: 2022-01-31 | End: 2023-05-10

## 2022-01-31 NOTE — TELEPHONE ENCOUNTER
Pt requesting refill of Simvastatin and Metformin. Meds are edwin'd up for verification and approval/denial.      Result note:  Please see recent lab results.  As we discussed I would recommend getting back on the statin as your cardiovascular risk score is 20%.  Your A1c has increased and I would recommend starting back on the Metformin with a goal of 1000 mg twice daily.  I am culturing out your urine.  I recommend following a low-cholesterol diet, 30 minutes of purposeful physical activity most days of the week outside of your regular schedule and weight loss is encouraged.  Would recommend recheck of labs when you establish care with Dr. Little.     Guilherme Rios Jr., CMA on 1/31/2022 at 11:37 AM

## 2022-02-08 ENCOUNTER — MYC MEDICAL ADVICE (OUTPATIENT)
Dept: INTERNAL MEDICINE | Facility: CLINIC | Age: 67
End: 2022-02-08
Payer: COMMERCIAL

## 2022-02-08 NOTE — TELEPHONE ENCOUNTER
Pt sending message through [employer]. She is concerned about what should be done next. She is drinking a lot of water, decreased urination, and back pain. Message is below. Please advise.    Pt Msg:  Dr Lui says my urine has E-Coli.  I am concerned I am not getting better.  I drink a lot of water and barely urinate and I have lower back pain on my left side.   I am on day 8 of taking the prescription.   Should I be taking a different drug? Thank you for your time.            Guilherme Rios Jr., BAKARI on 2/8/2022 at 3:46 PM

## 2022-02-08 NOTE — RESULT ENCOUNTER NOTE
Steve Ashley,    Your urine culture is growing E coli. Both strains are sensitive to the nitrofurantoin that Itzel prescribed on 1/28. If you continue to have symptoms, please follow up.    Please let me know if you have any questions or concerns,  Dr. Lui  (covering)

## 2022-02-09 NOTE — TELEPHONE ENCOUNTER
Contacted patient who explained she still has consistent lower left back pain as well as difficulty starting a stream and maintaining it.  No other symptoms at this time.  Explained that she may need abx/visit to discuss pyelo.  Patient states she is out of town and wont be back until Monday.  Advised she can be seen in an WIC or UC setting where she is.  Patient expressed doing so if she got worse, as of now plans to call clinic upon return to get appointment.

## 2022-02-09 NOTE — TELEPHONE ENCOUNTER
Tavon,    Please call patient and triage her symptoms. It sounds like she may have pyelonephritis, in which case I recommend she get in to be seen by a provider to see how sick she is. She got macrobid which would not be good for something like pyelonephritis, only good for cystitis. Depending on her exam, may need IM dose with switch to different antibiotic. But since I did not see patient on 1/27 and it has been 8 days on the antibiotic, it is best she get in with a provider.    This is something you can use any provider's same day slot. Not sure who is available. [facility] is other option.    Please let me know if you have any questions or concerns,  Dr. Lui

## 2022-02-15 ENCOUNTER — OFFICE VISIT (OUTPATIENT)
Dept: FAMILY MEDICINE | Facility: CLINIC | Age: 67
End: 2022-02-15
Payer: COMMERCIAL

## 2022-02-15 VITALS
BODY MASS INDEX: 35.91 KG/M2 | HEIGHT: 63 IN | RESPIRATION RATE: 18 BRPM | OXYGEN SATURATION: 98 % | DIASTOLIC BLOOD PRESSURE: 78 MMHG | SYSTOLIC BLOOD PRESSURE: 134 MMHG | HEART RATE: 62 BPM | WEIGHT: 202.7 LBS

## 2022-02-15 DIAGNOSIS — Z87.440 RECENT URINARY TRACT INFECTION: Primary | ICD-10-CM

## 2022-02-15 DIAGNOSIS — S76.312A STRAIN OF LEFT PIRIFORMIS MUSCLE, INITIAL ENCOUNTER: ICD-10-CM

## 2022-02-15 DIAGNOSIS — M54.50 ACUTE LEFT-SIDED LOW BACK PAIN WITHOUT SCIATICA: ICD-10-CM

## 2022-02-15 LAB
ALBUMIN UR-MCNC: 30 MG/DL
APPEARANCE UR: CLEAR
BACTERIA #/AREA URNS HPF: ABNORMAL /HPF
BILIRUB UR QL STRIP: ABNORMAL
COLOR UR AUTO: YELLOW
GLUCOSE UR STRIP-MCNC: 100 MG/DL
HGB UR QL STRIP: NEGATIVE
KETONES UR STRIP-MCNC: NEGATIVE MG/DL
LEUKOCYTE ESTERASE UR QL STRIP: ABNORMAL
NITRATE UR QL: NEGATIVE
PH UR STRIP: 6 [PH] (ref 5–8)
RBC #/AREA URNS AUTO: ABNORMAL /HPF
SP GR UR STRIP: 1.01 (ref 1–1.03)
SQUAMOUS #/AREA URNS AUTO: ABNORMAL /LPF
UROBILINOGEN UR STRIP-ACNC: 0.2 E.U./DL
WBC #/AREA URNS AUTO: ABNORMAL /HPF

## 2022-02-15 PROCEDURE — 81001 URINALYSIS AUTO W/SCOPE: CPT | Performed by: FAMILY MEDICINE

## 2022-02-15 PROCEDURE — 99213 OFFICE O/P EST LOW 20 MIN: CPT | Performed by: FAMILY MEDICINE

## 2022-02-15 ASSESSMENT — MIFFLIN-ST. JEOR: SCORE: 1424.69

## 2022-02-15 NOTE — PROGRESS NOTES
Assessment and Plan    Recent urinary tract infection  - UA Macro with Reflex to Micro and Culture - lab collect  - UA Macro with Reflex to Micro and Culture - lab collect  - Urine Microscopic - reassured normal    Left lower back pain /Strain of left piriformis muscle, initial encounter  Gabi related left low back/buttocks pain to UTI - discussed location of pain would not suggested relation to bladder, but more likely strain.  Discussed muscle strains are common and often people don't have symptoms until the next day, and do not recall a clear cause       Patient Instructions   I think you have had a strain of the muscle in your buttocks.     Stretching and doing the back exercises I gave you may help  You can alternate tylenol and ibuprofen up to very 3 hours for pain  Ice and heat can be helpful    Red flags   - numbness in your leg   - weakness in your foot or leg   - difficulty urinating or difficulty with control       Return if symptoms worsen or fail to improve.     Consider PHYSICAL THERAPY if symptoms do not improve    Jackie Handy MD     -------------------------------------------    Chief concern:     1/27/22 - Gabi was seen for an annual wellness visit - UA was collected as part if diabetes management and showed a UTI  For which nitrofurantoin was prescribed (culture showed sensitivity to this medication) . She did not note any specific symptoms and was surprised to find out about UTI    She started to notice back pain a few days after starting treatment. It has gradually gotten more present. It's all on the left side only. She gets up in the morning and has a hard time walking at first, and when she drives and hits a bump it also hurts. If she sits for a while then gets up she feels it . Pain has  gotten worse, but it stays in the same spot.    IF she puts a heating pad on it it gets better, and it also tends to improve over the day.  She has not had anything like this before    She has  "frequent urination, but that has been \"going on for a long time. \" Gabi drinks a lot of water and has diabetes.  She usually has to get up 2-3 times a night to urinate    ROS    - no weakness   - no numbness   - no shooting pains      Current Outpatient Medications   Medication     aspirin 81 MG EC tablet     blood sugar diagnostic (ONETOUCH VERIO) Strp     cholecalciferol, vitamin D3, 2,000 unit Tab     lisinopril (ZESTRIL) 20 MG tablet     metFORMIN (GLUCOPHAGE) 500 MG tablet     omeprazole (PRILOSEC) 20 MG DR capsule     simvastatin (ZOCOR) 40 MG tablet     timolol maleate (TIMOPTIC) 0.5 % ophthalmic solution     No current facility-administered medications for this visit.         The history section was last reviewed by Jackie Greco LPN on Feb 15, 2022.    History   Smoking Status     Never Smoker   Smokeless Tobacco     Never Used       Social History    Substance and Sexual Activity      Alcohol use: Yes        Alcohol/week: 12.0 - 15.0 standard drinks        Comment: 6-7 per week        /78 (BP Location: Left arm, Patient Position: Sitting, Cuff Size: Adult Large)   Pulse 62   Resp 18   Ht 1.61 m (5' 3.39\")   Wt 91.9 kg (202 lb 11.2 oz)   SpO2 98%   BMI 35.47 kg/m    Body mass index is 35.47 kg/m .     EXAM:    General appearance - alert, well appearing, and in no distress  Mental status - normal mood, behavior, speech, dress, motor activity, and thought processes  Back exam - tenderness noted center left buttocks muscle, negative straight-leg raise bilaterally  , normal reflexes and strength bilateral lower extremities      "

## 2022-02-15 NOTE — PATIENT INSTRUCTIONS
I think you have had a strain of the muscle in your buttocks.     Stretching and doing the back exercises I gave you may help  You can alternate tylenol and ibuprofen up to very 3 hours for pain  Ice and heat can be helpful    Red flags   - numbness in your leg   - weakness in your foot or leg   - difficulty urinating or difficulty with control

## 2022-05-04 ENCOUNTER — ANCILLARY PROCEDURE (OUTPATIENT)
Dept: MAMMOGRAPHY | Facility: CLINIC | Age: 67
End: 2022-05-04
Attending: NURSE PRACTITIONER
Payer: COMMERCIAL

## 2022-05-04 ENCOUNTER — ANCILLARY PROCEDURE (OUTPATIENT)
Dept: BONE DENSITY | Facility: CLINIC | Age: 67
End: 2022-05-04
Attending: NURSE PRACTITIONER
Payer: COMMERCIAL

## 2022-05-04 DIAGNOSIS — Z12.31 ENCOUNTER FOR SCREENING MAMMOGRAM FOR BREAST CANCER: ICD-10-CM

## 2022-05-04 PROCEDURE — 77063 BREAST TOMOSYNTHESIS BI: CPT | Mod: TC | Performed by: RADIOLOGY

## 2022-05-04 PROCEDURE — 77080 DXA BONE DENSITY AXIAL: CPT | Mod: TC | Performed by: INTERNAL MEDICINE

## 2022-05-04 PROCEDURE — 77067 SCR MAMMO BI INCL CAD: CPT | Mod: TC | Performed by: RADIOLOGY

## 2022-06-13 NOTE — PROGRESS NOTES
"  Assessment & Plan   Problem List Items Addressed This Visit        Endocrine    Type II diabetes mellitus (H) - Primary    Relevant Orders    COVID-19,PF,PFIZER (12+ YRS) (Completed)    Glucose (Completed)    Hemoglobin A1c (Completed)    FOOT EXAM (Completed)    PPSV23, IM/SUBQ (2+ YRS) - [id number] (Completed)    Albumin Random Urine Quantitative with Creat Ratio    Comprehensive metabolic panel (BMP + Alb, Alk Phos, ALT, AST, Total. Bili, TP)      Other Visit Diagnoses     Encounter for immunization        Relevant Orders    PPSV23, IM/SUBQ (2+ YRS) - [id number] (Completed)    Transaminitis        Relevant Orders    Comprehensive metabolic panel (BMP + Alb, Alk Phos, ALT, AST, Total. Bili, TP)          Type 2 diabetes:  Last HbA1c above goal at 8.9  Foot exam today is unremarkable with mildly decreased sensation with monofilament test   Has intermittent numbness in her feet that is transient.  Does not affect her ability to function.  No fasting sugars available as patient not checking her sugars at home  Due for repeat HbA1c, will check it today and base further medication titration on that.  Did discuss with patient that without additional data, specifically fasting sugars, it is hard for me to optimize her medication regimen.  Patient receptive to the information.  Discussed possibly getting a CGM with her.  She says she will \"think about it and let me know.  She may revisit doing fasting sugars once every few days just so I can have a few numbers and make sure she is not hypoglycemic at any point.  Patient will let me know via [employer] if she changes her mind about CGM.  Qualifies for vaccines - ordered     Chronic PPI usage:  Patient reports that she has been on it for about 40 years  Discussed possibly weaning herself off of it and seeing how she does. Patient will think about it     30 minutes spent on the date of the encounter doing chart review, history and exam, documentation and further activities " "per the note    Return in about 3 months (around 9/14/2022) for DM .    DO PETRA Flores [facility]    Gina Ashley is a 67 year old who presents for the following health issues: Establish care/    Patient recently saw my colleague, Itzel Willon physical    Is here to establish care and follow-up on her diabetes    Patient is not checking her sugars has poking her fingers it is painful for her.  She does not feel that she has had any hypoglycemic episodes.  Shaking/fatigue or syncope  Patient is currently on metformin  Reports intermittent numbness of her feet that goes away when she gets up around  Does nightly foot checks  Patient denies any polyuria polydipsia or polyuria        Feet but it goes away when she    HPI     Sugars at home   Can't do the bring   CGM and wants to think about   Metformin   ntermitten numbness       40 + years of prilosec         Diabetes Follow-up      How often are you checking your blood sugar? Not at all    What concerns do you have today about your diabetes? Blood sugar is often over 200     Do you have any of these symptoms? (Select all that apply)  Numbness in feet      BP Readings from Last 2 Encounters:   06/14/22 132/88   02/15/22 134/78     Hemoglobin A1C (%)   Date Value   06/14/2022 8.0 (H)   01/27/2022 8.9 (H)     LDL Cholesterol Calculated (mg/dL)   Date Value   01/27/2022 157 (H)   06/09/2020 164 (H)     Review of Systems   As per HPI       Objective    /88 (BP Location: Right arm, Patient Position: Sitting, Cuff Size: Adult Large)   Pulse 68   Temp 98.4  F (36.9  C) (Temporal)   Resp 16   Ht 5' 4\" (1.626 m)   Wt 207 lb 4.8 oz (94 kg)   BMI 35.58 kg/m    Body mass index is 35.58 kg/m .  Physical Exam   GENERAL: healthy, alert and no distress  NECK: no adenopathy, no asymmetry, masses, or scars and thyroid normal to palpation  RESP: lungs clear to auscultation - no rales, rhonchi or wheezes  CV: regular rate and rhythm, " normal S1 S2, no S3 or S4, no murmur, click or rub, no peripheral edema and peripheral pulses strong  ABDOMEN: soft, nontender, no hepatosplenomegaly, no masses and bowel sounds normal  MS: no gross musculoskeletal defects noted, no edema  Diabetic foot exam: normal DP and PT pulses, no trophic changes or ulcerative lesions, normal sensory exam and normal monofilament exam, except for findings noted on diabetic foot graphical image.    Decreased sensation: Left foot # 9, # 6, # 4   Right foot: #9, #8, #6

## 2022-06-14 ENCOUNTER — OFFICE VISIT (OUTPATIENT)
Dept: FAMILY MEDICINE | Facility: CLINIC | Age: 67
End: 2022-06-14
Payer: COMMERCIAL

## 2022-06-14 VITALS
SYSTOLIC BLOOD PRESSURE: 132 MMHG | BODY MASS INDEX: 35.39 KG/M2 | TEMPERATURE: 98.4 F | HEIGHT: 64 IN | HEART RATE: 68 BPM | RESPIRATION RATE: 16 BRPM | DIASTOLIC BLOOD PRESSURE: 88 MMHG | WEIGHT: 207.3 LBS

## 2022-06-14 DIAGNOSIS — E11.9 TYPE 2 DIABETES MELLITUS WITHOUT COMPLICATION, WITHOUT LONG-TERM CURRENT USE OF INSULIN (H): Primary | ICD-10-CM

## 2022-06-14 DIAGNOSIS — R74.01 TRANSAMINITIS: ICD-10-CM

## 2022-06-14 DIAGNOSIS — Z23 ENCOUNTER FOR IMMUNIZATION: ICD-10-CM

## 2022-06-14 LAB
FASTING STATUS PATIENT QL REPORTED: NO
GLUCOSE BLD-MCNC: 193 MG/DL (ref 70–125)
HBA1C MFR BLD: 8 % (ref 0–5.6)

## 2022-06-14 PROCEDURE — 99214 OFFICE O/P EST MOD 30 MIN: CPT | Mod: 25 | Performed by: STUDENT IN AN ORGANIZED HEALTH CARE EDUCATION/TRAINING PROGRAM

## 2022-06-14 PROCEDURE — 91305 COVID-19,PF,PFIZER (12+ YRS): CPT | Performed by: STUDENT IN AN ORGANIZED HEALTH CARE EDUCATION/TRAINING PROGRAM

## 2022-06-14 PROCEDURE — 36415 COLL VENOUS BLD VENIPUNCTURE: CPT | Performed by: STUDENT IN AN ORGANIZED HEALTH CARE EDUCATION/TRAINING PROGRAM

## 2022-06-14 PROCEDURE — 90732 PPSV23 VACC 2 YRS+ SUBQ/IM: CPT | Performed by: STUDENT IN AN ORGANIZED HEALTH CARE EDUCATION/TRAINING PROGRAM

## 2022-06-14 PROCEDURE — 82947 ASSAY GLUCOSE BLOOD QUANT: CPT | Mod: 59 | Performed by: STUDENT IN AN ORGANIZED HEALTH CARE EDUCATION/TRAINING PROGRAM

## 2022-06-14 PROCEDURE — 83036 HEMOGLOBIN GLYCOSYLATED A1C: CPT | Performed by: STUDENT IN AN ORGANIZED HEALTH CARE EDUCATION/TRAINING PROGRAM

## 2022-06-14 PROCEDURE — G0009 ADMIN PNEUMOCOCCAL VACCINE: HCPCS | Performed by: STUDENT IN AN ORGANIZED HEALTH CARE EDUCATION/TRAINING PROGRAM

## 2022-06-14 PROCEDURE — 0054A COVID-19,PF,PFIZER (12+ YRS): CPT | Performed by: STUDENT IN AN ORGANIZED HEALTH CARE EDUCATION/TRAINING PROGRAM

## 2022-06-14 PROCEDURE — 80053 COMPREHEN METABOLIC PANEL: CPT | Performed by: STUDENT IN AN ORGANIZED HEALTH CARE EDUCATION/TRAINING PROGRAM

## 2022-06-14 PROCEDURE — 99207 PR FOOT EXAM NO CHARGE: CPT | Performed by: STUDENT IN AN ORGANIZED HEALTH CARE EDUCATION/TRAINING PROGRAM

## 2022-06-14 RX ORDER — AMOXICILLIN 500 MG
1200 CAPSULE ORAL DAILY
COMMUNITY

## 2022-06-14 ASSESSMENT — PAIN SCALES - GENERAL: PAINLEVEL: NO PAIN (0)

## 2022-06-15 ENCOUNTER — TELEPHONE (OUTPATIENT)
Dept: FAMILY MEDICINE | Facility: CLINIC | Age: 67
End: 2022-06-15
Payer: COMMERCIAL

## 2022-06-15 DIAGNOSIS — E11.65 TYPE 2 DIABETES MELLITUS WITH HYPERGLYCEMIA, WITHOUT LONG-TERM CURRENT USE OF INSULIN (H): Primary | ICD-10-CM

## 2022-06-15 LAB
ALBUMIN SERPL-MCNC: 3.9 G/DL (ref 3.5–5)
ALP SERPL-CCNC: 97 U/L (ref 45–120)
ALT SERPL W P-5'-P-CCNC: 28 U/L (ref 0–45)
ANION GAP SERPL CALCULATED.3IONS-SCNC: 16 MMOL/L (ref 5–18)
AST SERPL W P-5'-P-CCNC: 38 U/L (ref 0–40)
BILIRUB SERPL-MCNC: 0.7 MG/DL (ref 0–1)
BUN SERPL-MCNC: 12 MG/DL (ref 8–22)
CALCIUM SERPL-MCNC: 10 MG/DL (ref 8.5–10.5)
CHLORIDE BLD-SCNC: 97 MMOL/L (ref 98–107)
CO2 SERPL-SCNC: 21 MMOL/L (ref 22–31)
CREAT SERPL-MCNC: 0.95 MG/DL (ref 0.6–1.1)
GFR SERPL CREATININE-BSD FRML MDRD: 65 ML/MIN/1.73M2
GLUCOSE BLD-MCNC: 205 MG/DL (ref 70–125)
POTASSIUM BLD-SCNC: 4.7 MMOL/L (ref 3.5–5)
PROT SERPL-MCNC: 7 G/DL (ref 6–8)
SODIUM SERPL-SCNC: 134 MMOL/L (ref 136–145)

## 2022-06-15 NOTE — TELEPHONE ENCOUNTER
Voicemail left for patient regarding following up with PCP in 3 months.  Included in voicemail that patient can call clinic back to schedule an appt or schedule through her Mychart.

## 2022-06-15 NOTE — TELEPHONE ENCOUNTER
----- Message from Shelbi Little DO sent at 6/15/2022  8:19 AM CDT -----  Call lab to make sure CMP has been added on     Please call patient to make 3 month follow up

## 2022-06-16 ENCOUNTER — LAB (OUTPATIENT)
Dept: LAB | Facility: CLINIC | Age: 67
End: 2022-06-16
Payer: COMMERCIAL

## 2022-06-16 DIAGNOSIS — E11.9 TYPE 2 DIABETES MELLITUS WITHOUT COMPLICATION, WITHOUT LONG-TERM CURRENT USE OF INSULIN (H): ICD-10-CM

## 2022-06-16 LAB
CREAT UR-MCNC: 31 MG/DL
MICROALBUMIN UR-MCNC: <0.5 MG/DL (ref 0–1.99)
MICROALBUMIN/CREAT UR: NORMAL MG/G{CREAT}

## 2022-06-16 PROCEDURE — 82043 UR ALBUMIN QUANTITATIVE: CPT

## 2022-08-02 ENCOUNTER — MYC MEDICAL ADVICE (OUTPATIENT)
Dept: FAMILY MEDICINE | Facility: CLINIC | Age: 67
End: 2022-08-02

## 2022-08-02 DIAGNOSIS — I10 BENIGN ESSENTIAL HYPERTENSION: ICD-10-CM

## 2022-08-02 DIAGNOSIS — K21.9 GASTROESOPHAGEAL REFLUX DISEASE WITHOUT ESOPHAGITIS: ICD-10-CM

## 2022-08-03 RX ORDER — LISINOPRIL 20 MG/1
TABLET ORAL
Qty: 90 TABLET | Refills: 2 | Status: SHIPPED | OUTPATIENT
Start: 2022-08-03 | End: 2023-05-04

## 2022-08-03 NOTE — TELEPHONE ENCOUNTER
Pt writing MyChart msg stating that the meds were refused twice... This is the only encounter regarding the refills in pt's chart. Routing to PCP to sign, if appropriate.       Guilherme Rios Jr., CMA on 8/3/2022 at 9:17 AM

## 2022-08-04 NOTE — TELEPHONE ENCOUNTER
"Last Written Prescription Date:  1/27/22  Last Fill Quantity: 90,  # refills: 1   Last office visit provider:  6/14/22     Requested Prescriptions   Pending Prescriptions Disp Refills     omeprazole (PRILOSEC) 20 MG DR capsule 90 capsule 1     Sig: TAKE 1 CAPSULE (20 MG TOTAL) BY MOUTH DAILY BEFORE BREAKFAST.       PPI Protocol Passed - 8/3/2022  9:18 AM        Passed - Not on Clopidogrel (unless Pantoprazole ordered)        Passed - No diagnosis of osteoporosis on record        Passed - Recent (12 mo) or future (30 days) visit within the authorizing provider's specialty     Patient has had an office visit with the authorizing provider or a provider within the authorizing providers department within the previous 12 mos or has a future within next 30 days. See \"Patient Info\" tab in inbasket, or \"Choose Columns\" in Meds & Orders section of the refill encounter.              Passed - Medication is active on med list        Passed - Patient is age 18 or older        Passed - No active pregnacy on record        Passed - No positive pregnancy test in past 12 months           lisinopril (ZESTRIL) 20 MG tablet 90 tablet 1     Sig: [LISINOPRIL (PRINIVIL,ZESTRIL) 20 MG TABLET] TAKE 1 TABLET BY MOUTH EVERY DAY       ACE Inhibitors (Including Combos) Protocol Passed - 8/3/2022  9:18 AM        Passed - Blood pressure under 140/90 in past 12 months     BP Readings from Last 3 Encounters:   06/14/22 132/88   02/15/22 134/78   01/27/22 122/78                 Passed - Recent (12 mo) or future (30 days) visit within the authorizing provider's specialty     Patient has had an office visit with the authorizing provider or a provider within the authorizing providers department within the previous 12 mos or has a future within next 30 days. See \"Patient Info\" tab in inbasket, or \"Choose Columns\" in Meds & Orders section of the refill encounter.              Passed - Medication is active on med list        Passed - Patient is age 18 or " older        Passed - No active pregnancy on record        Passed - Normal serum creatinine on file in past 12 months     Recent Labs   Lab Test 06/14/22  1243   CR 0.95       Ok to refill medication if creatinine is low          Passed - Normal serum potassium on file in past 12 months     Recent Labs   Lab Test 06/14/22  1243   POTASSIUM 4.7             Passed - No positive pregnancy test within past 12 months             Sheila Young, RN 08/03/22 9:13 PM

## 2022-10-01 ENCOUNTER — HEALTH MAINTENANCE LETTER (OUTPATIENT)
Age: 67
End: 2022-10-01

## 2023-02-04 ENCOUNTER — HEALTH MAINTENANCE LETTER (OUTPATIENT)
Age: 68
End: 2023-02-04

## 2023-04-21 ENCOUNTER — TRANSFERRED RECORDS (OUTPATIENT)
Dept: MULTI SPECIALTY CLINIC | Facility: CLINIC | Age: 68
End: 2023-04-21
Payer: COMMERCIAL

## 2023-04-21 LAB — RETINOPATHY: NORMAL

## 2023-05-02 ENCOUNTER — OFFICE VISIT (OUTPATIENT)
Dept: INTERNAL MEDICINE | Facility: CLINIC | Age: 68
End: 2023-05-02
Payer: COMMERCIAL

## 2023-05-02 ENCOUNTER — MEDICAL CORRESPONDENCE (OUTPATIENT)
Dept: HEALTH INFORMATION MANAGEMENT | Facility: CLINIC | Age: 68
End: 2023-05-02

## 2023-05-02 VITALS
HEIGHT: 64 IN | RESPIRATION RATE: 22 BRPM | WEIGHT: 208.7 LBS | OXYGEN SATURATION: 98 % | DIASTOLIC BLOOD PRESSURE: 84 MMHG | BODY MASS INDEX: 35.63 KG/M2 | HEART RATE: 68 BPM | SYSTOLIC BLOOD PRESSURE: 130 MMHG | TEMPERATURE: 98.1 F

## 2023-05-02 DIAGNOSIS — E78.5 HYPERLIPIDEMIA, UNSPECIFIED HYPERLIPIDEMIA TYPE: ICD-10-CM

## 2023-05-02 DIAGNOSIS — E66.01 MORBID OBESITY (H): ICD-10-CM

## 2023-05-02 DIAGNOSIS — K21.9 GASTROESOPHAGEAL REFLUX DISEASE, UNSPECIFIED WHETHER ESOPHAGITIS PRESENT: ICD-10-CM

## 2023-05-02 DIAGNOSIS — Z00.00 ENCOUNTER FOR MEDICARE ANNUAL WELLNESS EXAM: Primary | ICD-10-CM

## 2023-05-02 DIAGNOSIS — Z12.11 SCREEN FOR COLON CANCER: ICD-10-CM

## 2023-05-02 DIAGNOSIS — E11.65 TYPE 2 DIABETES MELLITUS WITH HYPERGLYCEMIA, WITHOUT LONG-TERM CURRENT USE OF INSULIN (H): ICD-10-CM

## 2023-05-02 DIAGNOSIS — R19.5 POSITIVE COLORECTAL CANCER SCREENING USING COLOGUARD TEST: ICD-10-CM

## 2023-05-02 DIAGNOSIS — E66.9 OBESITY (BMI 30-39.9): ICD-10-CM

## 2023-05-02 DIAGNOSIS — I10 BENIGN ESSENTIAL HYPERTENSION: ICD-10-CM

## 2023-05-02 LAB
CHOLEST SERPL-MCNC: 213 MG/DL
HBA1C MFR BLD: 7.4 % (ref 0–5.6)
HDLC SERPL-MCNC: 50 MG/DL
LDLC SERPL CALC-MCNC: 137 MG/DL
NONHDLC SERPL-MCNC: 163 MG/DL
TRIGL SERPL-MCNC: 132 MG/DL

## 2023-05-02 PROCEDURE — 99214 OFFICE O/P EST MOD 30 MIN: CPT | Mod: 25 | Performed by: NURSE PRACTITIONER

## 2023-05-02 PROCEDURE — 0124A COVID-19 BIVALENT 12+ (PFIZER): CPT | Performed by: NURSE PRACTITIONER

## 2023-05-02 PROCEDURE — 83036 HEMOGLOBIN GLYCOSYLATED A1C: CPT | Performed by: NURSE PRACTITIONER

## 2023-05-02 PROCEDURE — G0438 PPPS, INITIAL VISIT: HCPCS | Performed by: NURSE PRACTITIONER

## 2023-05-02 PROCEDURE — 91312 COVID-19 BIVALENT 12+ (PFIZER): CPT | Performed by: NURSE PRACTITIONER

## 2023-05-02 PROCEDURE — 80061 LIPID PANEL: CPT | Performed by: NURSE PRACTITIONER

## 2023-05-02 PROCEDURE — 36415 COLL VENOUS BLD VENIPUNCTURE: CPT | Performed by: NURSE PRACTITIONER

## 2023-05-02 RX ORDER — METFORMIN HCL 500 MG
2000 TABLET, EXTENDED RELEASE 24 HR ORAL DAILY
Qty: 360 TABLET | Refills: 1 | Status: SHIPPED | OUTPATIENT
Start: 2023-05-02 | End: 2023-10-26

## 2023-05-02 RX ORDER — FAMOTIDINE 20 MG/1
20 TABLET, FILM COATED ORAL DAILY
Qty: 30 TABLET | Refills: 0 | Status: SHIPPED | OUTPATIENT
Start: 2023-05-02 | End: 2023-06-09

## 2023-05-02 NOTE — PROGRESS NOTES
SUBJECTIVE:   Gabi is a 68 year old who presents for Preventive Visit.      6/14/2022    11:27 AM   Additional Questions   Roomed by Mitzi PLASENCIA CMA   Patient has been advised of split billing requirements and indicates understanding: Yes  Are you in the first 12 months of your Medicare coverage?  No    History of Present Illness       Reason for visit:  Prescription renewed    She eats 0-1 servings of fruits and vegetables daily.She consumes 0 sweetened beverage(s) daily.She exercises with enough effort to increase her heart rate 9 or less minutes per day.  She exercises with enough effort to increase her heart rate 3 or less days per week.   She is taking medications regularly.      Have you ever done Advance Care Planning? (For example, a Health Directive, POLST, or a discussion with a medical provider or your loved ones about your wishes): Yes, advance care planning is on file.    Fall risk  Fallen 2 or more times in the past year?: No  Any fall with injury in the past year?: No    Cognitive Screening   1) Repeat 3 items (Leader, Season, Table)   2) Clock draw:ABNORMAL wrong time   3) 3 item recall: }Recalls 2 objects   Results: ABNORMAL clock, 1-2 items recalled: PROBABLE COGNITIVE IMPAIRMENT, **INFORM PROVIDER**   Patient was able to communicate and respond to questions appropriately. Speech was clear and easy to follow. Patient was oriented x3 and aware of surroundings and situation.     Reviewed and updated as needed this visit by clinical staff and provider   Tobacco  Allergies  Meds  Problems  Med Hx  Surg Hx  Fam Hx        Social History     Tobacco Use     Smoking status: Never     Smokeless tobacco: Never   Vaping Use     Vaping status: Not on file   Substance Use Topics     Alcohol use: Yes     Alcohol/week: 12.0 - 15.0 standard drinks of alcohol     Comment: 6-7 per week     Patient reports having 6-7 drinks during the week socially with friends.       1/20/2022    10:49 AM   Alcohol Use  "  Prescreen: >3 drinks/day or >7 drinks/week? No     Do you have a current opioid prescription? No  Do you use any other controlled substances or medications that are not prescribed by a provider? None    Annual Wellness Visit  Patient has been advised of split billing requirements and indicates understanding: Yes     Are you in the first 12 months of your Medicare Part B coverage?  No    Physical Health:    In general, how would you rate your overall physical health? good    Outside of work, how many days during the week do you exercise?4-5 days/week    Outside of work, approximately how many minutes a day do you exercise?15-30 minutes    If you drink alcohol do you typically have >3 drinks per day or >7 drinks per week? No    Do you usually eat at least 4 servings of fruit and vegetables a day, include whole grains & fiber and avoid regularly eating high fat or \"junk\" foods? Yes    Do you have any problems taking medications regularly? No    Do you have any side effects from medications? none    Needs assistance for the following daily activities: no assistance needed    Which of the following safety concerns are present in your home?  none identified     Hearing impairment: No    In the past 6 months, have you been bothered by leaking of urine? no    Mental Health:    In general, how would you rate your overall mental or emotional health? good  PHQ-2 Score: 0    Do you feel safe in your environment? Yes    Have you ever done Advance Care Planning? (For example, a Health Directive, POLST, or a discussion with a medical provider or your loved ones about your wishes)? Yes, advance care planning is on file.    Do you have sleep apnea, excessive snoring or daytime drowsiness?: no. Reports 6 hours of restful sleep.     Patient Care Team:  Shelbi Little DO as PCP - General (Family Medicine)  Itzel Arauz NP as Assigned PCP    Patient has been advised of split billing requirements and indicates understanding: " Yes    I have reviewed Opioid Use Disorder and Substance Use Disorder risk factors and made any needed referrals.       Current providers sharing in care for this patient include:  Patient Care Team:  Shelbi Little DO as PCP - General (Family Medicine)  Itzel Arauz NP as Assigned PCP    The following health maintenance items are reviewed in Epic and correct as of today:  Health Maintenance   Topic Date Due     ANNUAL REVIEW OF HM ORDERS  Never done     COLORECTAL CANCER SCREENING  Never done     INFLUENZA VACCINE (1) 06/30/2023 (Originally 9/1/2022)     BMP  06/14/2023     DIABETIC FOOT EXAM  06/14/2023     Pneumococcal Vaccine: 65+ Years (2 - PCV) 06/14/2023     MICROALBUMIN  06/16/2023     A1C  11/02/2023     EYE EXAM  04/21/2024     MEDICARE ANNUAL WELLNESS VISIT  05/02/2024     LIPID  05/02/2024     FALL RISK ASSESSMENT  05/02/2024     MAMMO SCREENING  05/04/2024     DTAP/TDAP/TD IMMUNIZATION (2 - Td or Tdap) 11/11/2026     ADVANCE CARE PLANNING  05/02/2028     DEXA  05/04/2037     PHQ-2 (once per calendar year)  Completed     ZOSTER IMMUNIZATION  Completed     COVID-19 Vaccine  Completed     IPV IMMUNIZATION  Aged Out     MENINGITIS IMMUNIZATION  Aged Out     HEPATITIS C SCREENING  Discontinued     BP Readings from Last 3 Encounters:   05/02/23 130/84   06/14/22 132/88   02/15/22 134/78    Wt Readings from Last 3 Encounters:   05/02/23 94.7 kg (208 lb 11.2 oz)   06/14/22 94 kg (207 lb 4.8 oz)   02/15/22 91.9 kg (202 lb 11.2 oz)         Patient Active Problem List   Diagnosis     Acid reflux     Benign essential hypertension     Hyperlipidemia, unspecified hyperlipidemia type     Type II diabetes mellitus (H)     Sudden hearing loss, left     Morbid obesity (H)     Positive colorectal cancer screening using Cologuard test     Past Surgical History:   Procedure Laterality Date     CHOLECYSTECTOMY       HYSTERECTOMY  1998    for dysfunctional uterine bleeding, no cancer     OOPHORECTOMY Bilateral 1998      OVARIAN CYST SURGERY       TONSILLECTOMY         Social History     Tobacco Use     Smoking status: Never     Smokeless tobacco: Never   Vaping Use     Vaping status: Not on file   Substance Use Topics     Alcohol use: Yes     Alcohol/week: 12.0 - 15.0 standard drinks of alcohol     Comment: 6-7 per week     Family History   Problem Relation Age of Onset     Diabetes Mother      Heart Disease Father         CHF     Diabetes Brother      Breast Cancer Paternal Aunt         early 40s     Prostate Cancer No family hx of      Cancer No family hx of      Colon Cancer No family hx of          Recent Labs   Lab Test 05/02/23  0952 06/14/22  1243 01/27/22  1235 06/09/20  0920 07/09/18  0753   A1C 7.4* 8.0* 8.9* 7.0*  --    *  --  157* 164*  --    HDL 50  --  40* 46*  --    TRIG 132  --  232* 154*  --    ALT  --  28 57* 22  --    CR  --  0.95 1.09 0.84 0.92   GFRESTIMATED  --  65 55* >60 >60   GFRESTBLACK  --   --   --  >60 >60   POTASSIUM  --  4.7 4.1 4.2 4.9     FHS-7:       5/4/2022    10:30 AM   Breast CA Risk Assessment (FHS-7)   Did any of your first-degree relatives have breast or ovarian cancer? No   Did any of your relatives have bilateral breast cancer? No   Did any man in your family have breast cancer? No   Did any woman in your family have breast and ovarian cancer? No   Did any woman in your family have breast cancer before age 50 y? Yes   Do you have 2 or more relatives with breast and/or ovarian cancer? No   Do you have 2 or more relatives with breast and/or bowel cancer? No     Mammogram Screening: Recommended mammography every 1-2 years with patient discussion and risk factor consideration  Pertinent mammograms are reviewed under the imaging tab.    Hypertension Follow-Up  Patient reports constituently taking Lisinopril with no missed doses. Patient reports she is not monitoring blood pressure at home. Follows a low sodium diet. Denies any headaches, vision changes, lightheadedness/dizziness, or  "heart palpitations.     Diabetes Follow-Up  Patient reports checking blood glucose levels 3 times per week in the AM before meals with average readings in the 130s. Patient reports consistently taking Metformin with no missed doses.  She started on Jardiance after her lab work in June 2022 because her A1c was 8.0%.  Unfortunately, cost of medication precluded her from starting this medication so she never did.  Denies and nausea, vomiting,or bloating associated with Metformin. Reports infrequent diarrhea. Denies vision changes, lightheadedness, shakiness, polydipsia, polyphagia, polyuria, or numbness/tingling in peripheral extremities.     Review of Systems  CONSTITUTIONAL:NEGATIVE for fever, chills, change in weight  INTEGUMENTARY/SKIN: NEGATIVE for worrisome rashes, moles or lesions  EYES: NEGATIVE for vision changes or irritation  ENT/MOUTH: NEGATIVE for ear, mouth and throat problems  RESP:NEGATIVE for significant cough or SOB  BREAST: NEGATIVE for masses, tenderness or discharge  CV: NEGATIVE for chest pain, palpitations or peripheral edema  GI: NEGATIVE for nausea, abdominal pain, heartburn, or change in bowel habits  : negative for, dysuria, hematuria, incontinence, vaginal discharge and bleeding  MUSCULOSKELETAL: NEGATIVE for significant arthralgias or myalgia  NEURO: NEGATIVE for weakness, dizziness or paresthesias  ENDOCRINE: NEGATIVE for temperature intolerance, skin/hair changes  HEME/ALLERGY/IMMUNE: NEGATIVE for bleeding problems  PSYCHIATRIC: NEGATIVE for changes in mood or affect    OBJECTIVE:   /84 (BP Location: Right arm, Patient Position: Sitting, Cuff Size: Adult Large)   Pulse 68   Temp 98.1  F (36.7  C) (Oral)   Resp 22   Ht 1.626 m (5' 4\")   Wt 94.7 kg (208 lb 11.2 oz)   SpO2 98%   BMI 35.82 kg/m   Estimated body mass index is 35.82 kg/m  as calculated from the following:    Height as of this encounter: 1.626 m (5' 4\").    Weight as of this encounter: 94.7 kg (208 lb 11.2 " oz).  Physical Exam  GENERAL: healthy, alert and no distress  RESP: lungs clear to auscultation - no rales, rhonchi or wheezes  CV: regular rate and rhythm, normal S1 S2, no S3 or S4, no murmur, click or rub, no peripheral edema and peripheral pulses strong  MS: no gross musculoskeletal defects noted, no edema    Diagnostic Test Results:  Labs reviewed in Epic    ASSESSMENT / PLAN:     Problem List Items Addressed This Visit        Digestive    Acid reflux     Famotidine prescribed to take every other day to help taper off omeprazole.  If every other day dosing goes well, she can transition to just famotidine 20 mg daily which has less potential for long term side effects          Relevant Medications    famotidine (PEPCID) 20 MG tablet    Morbid obesity (H)     Encouraged patient to continue healthy diet with increased fruits and vegetables. Discussed with patient AHA exercise guidelines.         Relevant Medications    metFORMIN (GLUCOPHAGE XR) 500 MG 24 hr tablet       Endocrine    Hyperlipidemia, unspecified hyperlipidemia type     Repeat lipid profile  Continue simvastatin for primary prevention         Type II diabetes mellitus (H)     Diabetes = not at goal, A1c 7.4%  A1C ordered  Metformin IR changed to Metformin XR (due to past complaints of loose stools/diarrhea with IR) with dosage increase from 1000 mg daily to 2000 mg daily for better glucose control. Encouraged patient to continue low carbohydrate diet and to check glucose glucose regularly. Discussed AHA exercise guidelines. Educated patient to check feet regularly for redness, sores, or signs of infection.          Relevant Medications    metFORMIN (GLUCOPHAGE XR) 500 MG 24 hr tablet    Other Relevant Orders    HEMOGLOBIN A1C (Completed)    Lipid panel reflex to direct LDL Non-fasting (Completed)       Circulatory    Benign essential hypertension     Stable with current medications            Other    Positive colorectal cancer screening using  "Cologuard test     Patient had a positive cologuard screening in 2018 with no follow-up diagnostics. Colonoscopy referral placed. Patient denies changes in bowel pattern or blood in stool.        Other Visit Diagnoses     Encounter for Medicare annual wellness exam    -  Primary    Follow-up in one year for next annual wellness visit or as needed.    Screen for colon cancer        Relevant Orders    Colonoscopy Screening  Referral    Obesity (BMI 30-39.9)        Relevant Medications    metFORMIN (GLUCOPHAGE XR) 500 MG 24 hr tablet          Patient has been advised of split billing requirements and indicates understanding: Yes    COUNSELING:  Reviewed preventive health counseling, as reflected in patient instructions  Special attention given to:       Regular exercise       Healthy diet/nutrition       Immunizations    Vaccinated for: Covid-19         Osteoporosis prevention/bone health      BMI:   Estimated body mass index is 35.82 kg/m  as calculated from the following:    Height as of this encounter: 1.626 m (5' 4\").    Weight as of this encounter: 94.7 kg (208 lb 11.2 oz).   Weight management plan: Discussed healthy diet and exercise guidelines      She reports that she has never smoked. She has never used smokeless tobacco.      Appropriate preventive services were discussed with this patient, including applicable screening as appropriate for cardiovascular disease, diabetes, osteopenia/osteoporosis, and glaucoma.  As appropriate for age/gender, discussed screening for colorectal cancer, prostate cancer, breast cancer, and cervical cancer. Checklist reviewing preventive services available has been given to the patient.    Reviewed patients plan of care and provided an AVS. The Basic Care Plan (routine screening as documented in Health Maintenance) for Gabi meets the Care Plan requirement. This Care Plan has been established and reviewed with the Patient.    I was present with the nurse practitioner " student, Amol Sharma, who participated in the service and in the documentation of the note. I have verified the history and personally performed the physical exam and medical decision making. I agree with the assessment and plan of care as documented in the note.  Natalia Kneny, BALDOMERO, APRN, CNP   [facility]    Identified Health Risks:    I have reviewed Opioid Use Disorder and Substance Use Disorder risk factors and made any needed referrals.

## 2023-05-02 NOTE — ASSESSMENT & PLAN NOTE
Encouraged patient to continue healthy diet with increased fruits and vegetables. Discussed with patient AHA exercise guidelines.

## 2023-05-02 NOTE — PATIENT INSTRUCTIONS
Alternative omeprazole and famotidine for a 1 week then switch to famotidine. Let us know if this works for you.       Patient Education   Personalized Prevention Plan  You are due for the preventive services outlined below.  Your care team is available to assist you in scheduling these services.  If you have already completed any of these items, please share that information with your care team to update in your medical record.  Health Maintenance Due   Topic Date Due    ANNUAL REVIEW OF HM ORDERS  Never done    Colorectal Cancer Screening  Never done    COVID-19 Vaccine (5 - Booster for Pfizer series) 08/09/2022    Flu Vaccine (1) 09/01/2022    Eye Exam  09/03/2022    A1C Lab  12/14/2022    Annual Wellness Visit  01/27/2023    Cholesterol Lab  01/27/2023

## 2023-05-02 NOTE — Clinical Note
Please abstract the following data from this visit with this patient into the appropriate field in Epic:  Tests that can be patient reported without a hard copy:  Eye exam with ophthalmology on this date: 4/21/23 Exam Location: [facility]   Other Tests found in the patient's chart through Chart Review/Care Everywhere:  {Abstract Quality List (Optional):605160}  Note to Abstraction: If this section is blank, no results were found via Chart Review/Care Everywhere.

## 2023-05-02 NOTE — ASSESSMENT & PLAN NOTE
Famotidine prescribed to take every other day to help taper off omeprazole.  If every other day dosing goes well, she can transition to just famotidine 20 mg daily which has less potential for long term side effects

## 2023-05-02 NOTE — ASSESSMENT & PLAN NOTE
Diabetes = not at goal, A1c 7.4%  A1C ordered  Metformin IR changed to Metformin XR (due to past complaints of loose stools/diarrhea with IR) with dosage increase from 1000 mg daily to 2000 mg daily for better glucose control. Encouraged patient to continue low carbohydrate diet and to check glucose glucose regularly. Discussed AHA exercise guidelines. Educated patient to check feet regularly for redness, sores, or signs of infection.

## 2023-05-02 NOTE — ASSESSMENT & PLAN NOTE
Patient had a positive cologuard screening in 2018 with no follow-up diagnostics. Colonoscopy referral placed. Patient denies changes in bowel pattern or blood in stool.

## 2023-05-03 DIAGNOSIS — I10 BENIGN ESSENTIAL HYPERTENSION: ICD-10-CM

## 2023-05-03 NOTE — TELEPHONE ENCOUNTER
Pharmacy calling to get a medication refill on medications attached   Pt presents to the ED for a post op complication. 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 Pt is now experiencing N/V/D and is in pain. Pt also states she is bleeding. low Blood sugar 65

## 2023-05-04 RX ORDER — LISINOPRIL 20 MG/1
TABLET ORAL
Qty: 90 TABLET | Refills: 0 | Status: SHIPPED | OUTPATIENT
Start: 2023-05-04 | End: 2023-08-15

## 2023-05-04 NOTE — TELEPHONE ENCOUNTER
"Last Written Prescription Date:  8/3/22  Last Fill Quantity: 90,  # refills: 2   Last office visit provider:  5/2/23     Requested Prescriptions   Pending Prescriptions Disp Refills     lisinopril (ZESTRIL) 20 MG tablet 90 tablet 2     Sig: [LISINOPRIL (PRINIVIL,ZESTRIL) 20 MG TABLET] TAKE 1 TABLET BY MOUTH EVERY DAY       ACE Inhibitors (Including Combos) Protocol Passed - 5/4/2023  1:05 PM        Passed - Blood pressure under 140/90 in past 12 months     BP Readings from Last 3 Encounters:   05/02/23 130/84   06/14/22 132/88   02/15/22 134/78                 Passed - Recent (12 mo) or future (30 days) visit within the authorizing provider's specialty     Patient has had an office visit with the authorizing provider or a provider within the authorizing providers department within the previous 12 mos or has a future within next 30 days. See \"Patient Info\" tab in inbasket, or \"Choose Columns\" in Meds & Orders section of the refill encounter.              Passed - Medication is active on med list        Passed - Patient is age 18 or older        Passed - No active pregnancy on record        Passed - Normal serum creatinine on file in past 12 months     Recent Labs   Lab Test 06/14/22  1243   CR 0.95       Ok to refill medication if creatinine is low          Passed - Normal serum potassium on file in past 12 months     Recent Labs   Lab Test 06/14/22  1243   POTASSIUM 4.7             Passed - No positive pregnancy test within past 12 months             Colton Mcnamara RN 05/04/23 1:05 PM  "

## 2023-05-08 ENCOUNTER — TELEPHONE (OUTPATIENT)
Dept: INTERNAL MEDICINE | Facility: CLINIC | Age: 68
End: 2023-05-08
Payer: COMMERCIAL

## 2023-05-08 NOTE — TELEPHONE ENCOUNTER
Call pt, she did not view the result below on [employer]:     The results of your cholesterol test are above goal.  Because of your diagnosis of type 2 diabetes, I would like to see your LDL cholesterol less than 100.  Would you be open to changing your cholesterol medicine from simvastatin 40 mg to atorvastatin 40 mg?  This is a more potent cholesterol medication and I believe would be more effective in lowering your cholesterol levels.  Please send me a [employer] message to let me know and then I will send it to your pharmacy.

## 2023-05-25 ENCOUNTER — DOCUMENTATION ONLY (OUTPATIENT)
Dept: OTHER | Facility: CLINIC | Age: 68
End: 2023-05-25
Payer: COMMERCIAL

## 2023-06-09 ENCOUNTER — MYC REFILL (OUTPATIENT)
Dept: INTERNAL MEDICINE | Facility: CLINIC | Age: 68
End: 2023-06-09
Payer: COMMERCIAL

## 2023-06-09 DIAGNOSIS — K21.9 GASTROESOPHAGEAL REFLUX DISEASE, UNSPECIFIED WHETHER ESOPHAGITIS PRESENT: ICD-10-CM

## 2023-06-10 RX ORDER — FAMOTIDINE 20 MG/1
20 TABLET, FILM COATED ORAL DAILY
Qty: 30 TABLET | Refills: 10 | Status: SHIPPED | OUTPATIENT
Start: 2023-06-10 | End: 2023-10-26

## 2023-06-10 NOTE — TELEPHONE ENCOUNTER
"Last Written Prescription Date:  5/2/23  Last Fill Quantity: 30,  # refills: 0   Last office visit provider:  5/2/23     Requested Prescriptions   Pending Prescriptions Disp Refills     famotidine (PEPCID) 20 MG tablet 30 tablet 0     Sig: Take 1 tablet (20 mg) by mouth daily       H2 Blockers Protocol Passed - 6/9/2023  4:28 PM        Passed - Patient is age 12 or older        Passed - Recent (12 mo) or future (30 days) visit within the authorizing provider's specialty     Patient has had an office visit with the authorizing provider or a provider within the authorizing providers department within the previous 12 mos or has a future within next 30 days. See \"Patient Info\" tab in inbasket, or \"Choose Columns\" in Meds & Orders section of the refill encounter.              Passed - Medication is active on med Sheila Bradford RN 06/10/23 10:54 AM  "

## 2023-08-08 DIAGNOSIS — K21.9 GASTROESOPHAGEAL REFLUX DISEASE WITHOUT ESOPHAGITIS: ICD-10-CM

## 2023-08-09 NOTE — TELEPHONE ENCOUNTER
Refill request received from [facility] for refill on Omeprazole.    Margarita LABOY CMA  [facility]

## 2023-08-09 NOTE — TELEPHONE ENCOUNTER
"Routing refill request to provider for review/approval because:  Patient needs to be seen because it has been more than 1 year since last office visit.    Last Written Prescription Date:  8/3/22  Last Fill Quantity: 90,  # refills: 2   Last office visit provider:  5/2/23     Requested Prescriptions   Pending Prescriptions Disp Refills    omeprazole (PRILOSEC) 20 MG DR capsule 90 capsule 2     Sig: TAKE 1 CAPSULE (20 MG TOTAL) BY MOUTH DAILY BEFORE BREAKFAST.       PPI Protocol Failed - 8/9/2023  1:31 PM        Failed - Recent (12 mo) or future (30 days) visit within the authorizing provider's specialty     Patient has had an office visit with the authorizing provider or a provider within the authorizing providers department within the previous 12 mos or has a future within next 30 days. See \"Patient Info\" tab in inbasket, or \"Choose Columns\" in Meds & Orders section of the refill encounter.              Passed - Not on Clopidogrel (unless Pantoprazole ordered)        Passed - No diagnosis of osteoporosis on record        Passed - Medication is active on med list        Passed - Patient is age 18 or older        Passed - No active pregnacy on record        Passed - No positive pregnancy test in past 12 months             Ann-Marie Blankenship 08/09/23 5:49 PM  "

## 2023-08-11 DIAGNOSIS — K21.9 GASTROESOPHAGEAL REFLUX DISEASE WITHOUT ESOPHAGITIS: ICD-10-CM

## 2023-08-11 NOTE — TELEPHONE ENCOUNTER
"Routing refill request to provider for review/approval because:  A break in medication    Last Written Prescription Date:  8/3/22  Last Fill Quantity: 90,  # refills: 2   Last office visit provider:  5/2/23     Requested Prescriptions   Pending Prescriptions Disp Refills    omeprazole (PRILOSEC) 20 MG DR capsule [Pharmacy Med Name: OMEPRAZOLE 20MG CAPSULES] 90 capsule 2     Sig: TAKE 1 CAPSULE BY MOUTH EVERY MORNING BEFORE BREAKFAST       PPI Protocol Passed - 8/11/2023  2:51 PM        Passed - Not on Clopidogrel (unless Pantoprazole ordered)        Passed - No diagnosis of osteoporosis on record        Passed - Recent (12 mo) or future (30 days) visit within the authorizing provider's specialty     Patient has had an office visit with the authorizing provider or a provider within the authorizing providers department within the previous 12 mos or has a future within next 30 days. See \"Patient Info\" tab in inbasket, or \"Choose Columns\" in Meds & Orders section of the refill encounter.              Passed - Medication is active on med list        Passed - Patient is age 18 or older        Passed - No active pregnacy on record        Passed - No positive pregnancy test in past 12 months             Colton Mcnamara RN 08/11/23 3:15 PM  "

## 2023-08-15 ENCOUNTER — MYC REFILL (OUTPATIENT)
Dept: FAMILY MEDICINE | Facility: CLINIC | Age: 68
End: 2023-08-15
Payer: COMMERCIAL

## 2023-08-15 DIAGNOSIS — I10 BENIGN ESSENTIAL HYPERTENSION: ICD-10-CM

## 2023-08-15 NOTE — TELEPHONE ENCOUNTER
"Routing refill request to provider for review/approval because:  Labs not current:  Cr, K+    Last Written Prescription Date:  5/4/2023  Last Fill Quantity: 90,  # refills: 0   Last office visit provider:  5/2/2023     Requested Prescriptions   Pending Prescriptions Disp Refills    lisinopril (ZESTRIL) 20 MG tablet 90 tablet 0     Sig: [LISINOPRIL (PRINIVIL,ZESTRIL) 20 MG TABLET] TAKE 1 TABLET BY MOUTH EVERY DAY       ACE Inhibitors (Including Combos) Protocol Failed - 8/15/2023  2:40 PM        Failed - Recent (12 mo) or future (30 days) visit within the authorizing provider's specialty     Patient has had an office visit with the authorizing provider or a provider within the authorizing providers department within the previous 12 mos or has a future within next 30 days. See \"Patient Info\" tab in inbasket, or \"Choose Columns\" in Meds & Orders section of the refill encounter.              Failed - Normal serum creatinine on file in past 12 months     Recent Labs   Lab Test 06/14/22  1243   CR 0.95       Ok to refill medication if creatinine is low          Failed - Normal serum potassium on file in past 12 months     Recent Labs   Lab Test 06/14/22  1243   POTASSIUM 4.7             Passed - Blood pressure under 140/90 in past 12 months     BP Readings from Last 3 Encounters:   05/02/23 130/84   06/14/22 132/88   02/15/22 134/78                 Passed - Medication is active on med list        Passed - Patient is age 18 or older        Passed - No active pregnancy on record        Passed - No positive pregnancy test within past 12 months             Debbie Oakes RN 08/15/23 2:40 PM  "

## 2023-08-16 RX ORDER — LISINOPRIL 20 MG/1
TABLET ORAL
Qty: 90 TABLET | Refills: 0 | Status: SHIPPED | OUTPATIENT
Start: 2023-08-16 | End: 2023-10-26

## 2023-10-25 ENCOUNTER — TELEPHONE (OUTPATIENT)
Dept: FAMILY MEDICINE | Facility: CLINIC | Age: 68
End: 2023-10-25

## 2023-10-25 NOTE — TELEPHONE ENCOUNTER
Left message for patient to call the office back to reschedule appointment. Dr. Little is out of the office today.

## 2023-10-26 ENCOUNTER — OFFICE VISIT (OUTPATIENT)
Dept: INTERNAL MEDICINE | Facility: CLINIC | Age: 68
End: 2023-10-26
Payer: COMMERCIAL

## 2023-10-26 VITALS
RESPIRATION RATE: 14 BRPM | TEMPERATURE: 97.7 F | DIASTOLIC BLOOD PRESSURE: 80 MMHG | WEIGHT: 211 LBS | HEIGHT: 65 IN | HEART RATE: 77 BPM | BODY MASS INDEX: 35.16 KG/M2 | SYSTOLIC BLOOD PRESSURE: 138 MMHG | OXYGEN SATURATION: 100 %

## 2023-10-26 DIAGNOSIS — E78.5 HYPERLIPIDEMIA, UNSPECIFIED HYPERLIPIDEMIA TYPE: Primary | ICD-10-CM

## 2023-10-26 DIAGNOSIS — Z12.31 ENCOUNTER FOR SCREENING MAMMOGRAM FOR BREAST CANCER: ICD-10-CM

## 2023-10-26 DIAGNOSIS — I10 BENIGN ESSENTIAL HYPERTENSION: ICD-10-CM

## 2023-10-26 DIAGNOSIS — Z23 NEED FOR PROPHYLACTIC VACCINATION AGAINST HEPATITIS B VIRUS: ICD-10-CM

## 2023-10-26 DIAGNOSIS — E11.65 TYPE 2 DIABETES MELLITUS WITH HYPERGLYCEMIA, WITHOUT LONG-TERM CURRENT USE OF INSULIN (H): ICD-10-CM

## 2023-10-26 DIAGNOSIS — R19.5 POSITIVE COLORECTAL CANCER SCREENING USING COLOGUARD TEST: ICD-10-CM

## 2023-10-26 DIAGNOSIS — Z29.11 NEED FOR VACCINATION AGAINST RESPIRATORY SYNCYTIAL VIRUS: ICD-10-CM

## 2023-10-26 DIAGNOSIS — Z13.29 SCREENING FOR THYROID DISORDER: ICD-10-CM

## 2023-10-26 LAB
ANION GAP SERPL CALCULATED.3IONS-SCNC: 11 MMOL/L (ref 7–15)
BASOPHILS # BLD AUTO: 0.1 10E3/UL (ref 0–0.2)
BASOPHILS NFR BLD AUTO: 1 %
BUN SERPL-MCNC: 18.5 MG/DL (ref 8–23)
CALCIUM SERPL-MCNC: 9.8 MG/DL (ref 8.8–10.2)
CHLORIDE SERPL-SCNC: 96 MMOL/L (ref 98–107)
CREAT SERPL-MCNC: 0.88 MG/DL (ref 0.51–0.95)
DEPRECATED HCO3 PLAS-SCNC: 24 MMOL/L (ref 22–29)
EGFRCR SERPLBLD CKD-EPI 2021: 71 ML/MIN/1.73M2
EOSINOPHIL # BLD AUTO: 0.2 10E3/UL (ref 0–0.7)
EOSINOPHIL NFR BLD AUTO: 2 %
ERYTHROCYTE [DISTWIDTH] IN BLOOD BY AUTOMATED COUNT: 12.3 % (ref 10–15)
GLUCOSE SERPL-MCNC: 202 MG/DL (ref 70–99)
HBA1C MFR BLD: 6.9 % (ref 0–5.6)
HCT VFR BLD AUTO: 39.5 % (ref 35–47)
HGB BLD-MCNC: 14.3 G/DL (ref 11.7–15.7)
IMM GRANULOCYTES # BLD: 0 10E3/UL
IMM GRANULOCYTES NFR BLD: 0 %
LYMPHOCYTES # BLD AUTO: 2.8 10E3/UL (ref 0.8–5.3)
LYMPHOCYTES NFR BLD AUTO: 33 %
MCH RBC QN AUTO: 34.9 PG (ref 26.5–33)
MCHC RBC AUTO-ENTMCNC: 36.2 G/DL (ref 31.5–36.5)
MCV RBC AUTO: 96 FL (ref 78–100)
MONOCYTES # BLD AUTO: 0.5 10E3/UL (ref 0–1.3)
MONOCYTES NFR BLD AUTO: 6 %
NEUTROPHILS # BLD AUTO: 4.8 10E3/UL (ref 1.6–8.3)
NEUTROPHILS NFR BLD AUTO: 57 %
PLATELET # BLD AUTO: 235 10E3/UL (ref 150–450)
POTASSIUM SERPL-SCNC: 5.1 MMOL/L (ref 3.4–5.3)
RBC # BLD AUTO: 4.1 10E6/UL (ref 3.8–5.2)
SODIUM SERPL-SCNC: 131 MMOL/L (ref 135–145)
T4 FREE SERPL-MCNC: 1.07 NG/DL (ref 0.9–1.7)
TSH SERPL DL<=0.005 MIU/L-ACNC: 4.21 UIU/ML (ref 0.3–4.2)
WBC # BLD AUTO: 8.3 10E3/UL (ref 4–11)

## 2023-10-26 PROCEDURE — 80048 BASIC METABOLIC PNL TOTAL CA: CPT | Performed by: NURSE PRACTITIONER

## 2023-10-26 PROCEDURE — 90480 ADMN SARSCOV2 VAC 1/ONLY CMP: CPT | Performed by: NURSE PRACTITIONER

## 2023-10-26 PROCEDURE — G0008 ADMIN INFLUENZA VIRUS VAC: HCPCS | Performed by: NURSE PRACTITIONER

## 2023-10-26 PROCEDURE — 90677 PCV20 VACCINE IM: CPT | Performed by: NURSE PRACTITIONER

## 2023-10-26 PROCEDURE — 83036 HEMOGLOBIN GLYCOSYLATED A1C: CPT | Performed by: NURSE PRACTITIONER

## 2023-10-26 PROCEDURE — 91320 SARSCV2 VAC 30MCG TRS-SUC IM: CPT | Performed by: NURSE PRACTITIONER

## 2023-10-26 PROCEDURE — 84439 ASSAY OF FREE THYROXINE: CPT | Performed by: NURSE PRACTITIONER

## 2023-10-26 PROCEDURE — 85025 COMPLETE CBC W/AUTO DIFF WBC: CPT | Performed by: NURSE PRACTITIONER

## 2023-10-26 PROCEDURE — 36415 COLL VENOUS BLD VENIPUNCTURE: CPT | Performed by: NURSE PRACTITIONER

## 2023-10-26 PROCEDURE — G0009 ADMIN PNEUMOCOCCAL VACCINE: HCPCS | Performed by: NURSE PRACTITIONER

## 2023-10-26 PROCEDURE — 90662 IIV NO PRSV INCREASED AG IM: CPT | Performed by: NURSE PRACTITIONER

## 2023-10-26 PROCEDURE — 84443 ASSAY THYROID STIM HORMONE: CPT | Performed by: NURSE PRACTITIONER

## 2023-10-26 PROCEDURE — 99214 OFFICE O/P EST MOD 30 MIN: CPT | Mod: 25 | Performed by: NURSE PRACTITIONER

## 2023-10-26 RX ORDER — RESPIRATORY SYNCYTIAL VIRUS VACCINE 120MCG/0.5
0.5 KIT INTRAMUSCULAR ONCE
Qty: 1 EACH | Refills: 0 | Status: SHIPPED | OUTPATIENT
Start: 2023-10-26 | End: 2023-10-26

## 2023-10-26 RX ORDER — ATORVASTATIN CALCIUM 40 MG/1
40 TABLET, FILM COATED ORAL DAILY
Qty: 90 TABLET | Refills: 1 | Status: SHIPPED | OUTPATIENT
Start: 2023-10-26 | End: 2024-05-14

## 2023-10-26 RX ORDER — METFORMIN HCL 500 MG
2000 TABLET, EXTENDED RELEASE 24 HR ORAL DAILY
Qty: 90 TABLET | Refills: 1 | Status: SHIPPED | OUTPATIENT
Start: 2023-10-26 | End: 2024-04-29

## 2023-10-26 RX ORDER — LISINOPRIL 20 MG/1
TABLET ORAL
Qty: 90 TABLET | Refills: 1 | Status: SHIPPED | OUTPATIENT
Start: 2023-10-26 | End: 2024-05-14

## 2023-10-26 NOTE — PROGRESS NOTES
"  Assessment & Plan   Problem List Items Addressed This Visit          Endocrine    Hyperlipidemia, unspecified hyperlipidemia type - Primary    Relevant Medications    atorvastatin (LIPITOR) 40 MG tablet    Type II diabetes mellitus (H)    Relevant Medications    metFORMIN (GLUCOPHAGE XR) 500 MG 24 hr tablet    Other Relevant Orders    Albumin Random Urine Quantitative with Creat Ratio    HEMOGLOBIN A1C       Circulatory    Benign essential hypertension    Relevant Medications    lisinopril (ZESTRIL) 20 MG tablet    Other Relevant Orders    BASIC METABOLIC PANEL     Other Visit Diagnoses       Need for prophylactic vaccination against hepatitis B virus        Relevant Medications    hepatitis b vaccine recombinant (RECOMBIVAX-HB) 10 MCG/ML injection    Need for vaccination against respiratory syncytial virus        Relevant Medications    respiratory syncytial virus vaccine, bivalent (ABRYSVO) injection              BMI:   Estimated body mass index is 35.66 kg/m  as calculated from the following:    Height as of this encounter: 1.638 m (5' 4.5\").    Weight as of this encounter: 95.7 kg (211 lb).   Weight management plan: recommend continued daily physical activity   Patient is exercising most days of the week.       MEDICATIONS:   Orders Placed This Encounter   Medications    hepatitis b vaccine recombinant (RECOMBIVAX-HB) 10 MCG/ML injection     Sig: Inject 0.5 mLs (5 mcg) into the muscle once for 1 dose     Dispense:  1 mL     Refill:  0     Pharmacist Administered    respiratory syncytial virus vaccine, bivalent (ABRYSVO) injection     Sig: Inject 0.5 mLs into the muscle once for 1 dose     Dispense:  1 each     Refill:  0     Pharmacist Administered.    atorvastatin (LIPITOR) 40 MG tablet     Sig: Take 1 tablet (40 mg) by mouth daily     Dispense:  90 tablet     Refill:  1    lisinopril (ZESTRIL) 20 MG tablet     Sig: [LISINOPRIL (PRINIVIL,ZESTRIL) 20 MG TABLET] TAKE 1 TABLET BY MOUTH EVERY DAY     Dispense:  " 90 tablet     Refill:  1    metFORMIN (GLUCOPHAGE XR) 500 MG 24 hr tablet     Sig: Take 4 tablets (2,000 mg) by mouth daily     Dispense:  90 tablet     Refill:  1     Medications Discontinued During This Encounter   Medication Reason    famotidine (PEPCID) 20 MG tablet Med Rec(No AVS / No eCancel)    omeprazole (PRILOSEC) 20 MG DR capsule Med Rec(No AVS / No eCancel)    metFORMIN (GLUCOPHAGE XR) 500 MG 24 hr tablet Reorder (No AVS)    atorvastatin (LIPITOR) 40 MG tablet Reorder (No AVS)    lisinopril (ZESTRIL) 20 MG tablet Reorder (No AVS)          - Continue other medications without change  CONSULTATION/REFERRAL to decline colonoscopy despite positive cologuard   FUTURE APPOINTMENTS:       - Follow-up visit in May 2024 YARA Arauz NP  [facility]    Gina Ashley is a 68 year old, presenting for the following health issues:  Follow Up      10/26/2023     7:31 AM   Additional Questions   Roomed by Adriana BECKER CMA   Accompanied by N/A       History of Present Illness       Diabetes:   She presents for follow up of diabetes.    She is not checking blood glucose.         She has no concerns regarding her diabetes at this time.   She is not experiencing numbness or burning in feet, excessive thirst, blurry vision, weight changes or redness, sores or blisters on feet.           Hyperlipidemia:  She presents for follow up of hyperlipidemia.   She is taking medication to lower cholesterol. She is not having myalgia or other side effects to statin medications.    Hypertension: She presents for follow up of hypertension.  She does not check blood pressure  regularly outside of the clinic. Outpatient blood pressures have not been over 140/90. She does not follow a low salt diet.     She eats 0-1 servings of fruits and vegetables daily.She consumes 0 sweetened beverage(s) daily.She exercises with enough effort to increase her heart rate 10 to 19 minutes per day.  She exercises with  "enough effort to increase her heart rate 5 days per week.   She is taking medications regularly.         Review of Systems   Constitutional, HEENT, cardiovascular, pulmonary, GI, , musculoskeletal, neuro, skin, endocrine and psych systems are negative, except as otherwise noted.      Objective    /88 (BP Location: Right arm, Patient Position: Sitting, Cuff Size: Adult Regular)   Pulse 77   Temp 97.7  F (36.5  C) (Oral)   Resp 14   Ht 1.638 m (5' 4.5\")   Wt 95.7 kg (211 lb)   LMP  (LMP Unknown)   SpO2 100%   BMI 35.66 kg/m    Body mass index is 35.66 kg/m .  Physical Exam   GENERAL: healthy, alert and no distress  EYES: Eyes grossly normal to inspection, conjunctivae and sclerae normal  RESP: lungs clear to auscultation - no rales, rhonchi or wheezes  CV: regular rate and rhythm, normal S1 S2, no S3 or S4, no murmur, click or rub, no peripheral edema and peripheral pulses strong  MS: no gross musculoskeletal defects noted, no edema  PSYCH: mentation appears normal, affect normal/bright  Diabetic foot exam: normal DP and PT pulses, no trophic changes or ulcerative lesions, and normal sensory exam            Current Outpatient Medications:     aspirin 81 MG EC tablet, [ASPIRIN 81 MG EC TABLET] Take 81 mg by mouth once daily., Disp: , Rfl:     atorvastatin (LIPITOR) 40 MG tablet, Take 1 tablet (40 mg) by mouth daily, Disp: 90 tablet, Rfl: 1    blood sugar diagnostic (ONETOUCH VERIO) Strp, [BLOOD SUGAR DIAGNOSTIC (ONETOUCH VERIO) STRP] Apply 1 each topically 2 (two) times a day., Disp: , Rfl:     cholecalciferol, vitamin D3, 2,000 unit Tab, [CHOLECALCIFEROL, VITAMIN D3, 2,000 UNIT TAB] Take 1 tablet by mouth once daily., Disp: , Rfl:     hepatitis b vaccine recombinant (RECOMBIVAX-HB) 10 MCG/ML injection, Inject 0.5 mLs (5 mcg) into the muscle once for 1 dose, Disp: 1 mL, Rfl: 0    lisinopril (ZESTRIL) 20 MG tablet, [LISINOPRIL (PRINIVIL,ZESTRIL) 20 MG TABLET] TAKE 1 TABLET BY MOUTH EVERY DAY, Disp: 90 " tablet, Rfl: 1    metFORMIN (GLUCOPHAGE XR) 500 MG 24 hr tablet, Take 4 tablets (2,000 mg) by mouth daily, Disp: 90 tablet, Rfl: 1    Omega-3 Fatty Acids (FISH OIL) 1200 MG capsule, Take 1,200 mg by mouth daily, Disp: , Rfl:     omeprazole (PRILOSEC) 20 MG DR capsule, TAKE 1 CAPSULE (20 MG TOTAL) BY MOUTH DAILY BEFORE BREAKFAST., Disp: 90 capsule, Rfl: 2    respiratory syncytial virus vaccine, bivalent (ABRYSVO) injection, Inject 0.5 mLs into the muscle once for 1 dose, Disp: 1 each, Rfl: 0    timolol maleate (TIMOPTIC) 0.5 % ophthalmic solution, [TIMOLOL MALEATE (TIMOPTIC) 0.5 % OPHTHALMIC SOLUTION] APPLY 1 DROP IN BOTH EYES ONCE IN THE MORNING, Disp: , Rfl: 3    Wt Readings from Last 5 Encounters:   10/26/23 95.7 kg (211 lb)   05/02/23 94.7 kg (208 lb 11.2 oz)   06/14/22 94 kg (207 lb 4.8 oz)   02/15/22 91.9 kg (202 lb 11.2 oz)   01/27/22 91.4 kg (201 lb 9.6 oz)

## 2023-10-31 DIAGNOSIS — E11.65 TYPE 2 DIABETES MELLITUS WITH HYPERGLYCEMIA, WITHOUT LONG-TERM CURRENT USE OF INSULIN (H): ICD-10-CM

## 2023-10-31 RX ORDER — METFORMIN HCL 500 MG
2000 TABLET, EXTENDED RELEASE 24 HR ORAL DAILY
Qty: 90 TABLET | Refills: 1 | OUTPATIENT
Start: 2023-10-31

## 2023-11-17 ENCOUNTER — TRANSFERRED RECORDS (OUTPATIENT)
Dept: HEALTH INFORMATION MANAGEMENT | Facility: CLINIC | Age: 68
End: 2023-11-17
Payer: COMMERCIAL

## 2023-11-17 LAB — RETINOPATHY: NEGATIVE

## 2023-11-30 ENCOUNTER — TELEPHONE (OUTPATIENT)
Dept: FAMILY MEDICINE | Facility: CLINIC | Age: 68
End: 2023-11-30
Payer: COMMERCIAL

## 2023-11-30 NOTE — TELEPHONE ENCOUNTER
Attempted to reach Gabi Sandhu in regards to their metformin. Left voicemail, with callback number, requesting return call. Per our records last filled 8/8/23 for 90 day supply and is 25 days late to refill.      Evi Morel, PharmD, [facility]  [facility]armacy   [phone]

## 2023-11-30 NOTE — TELEPHONE ENCOUNTER
Gabi returned my call- is getting metformin refilled today.     Evi Morel [facility] on 11/30/2023 at 1:14 PM

## 2024-04-04 ENCOUNTER — PATIENT OUTREACH (OUTPATIENT)
Dept: CARE COORDINATION | Facility: CLINIC | Age: 69
End: 2024-04-04
Payer: COMMERCIAL

## 2024-04-18 ENCOUNTER — APPOINTMENT (OUTPATIENT)
Dept: CT IMAGING | Facility: HOSPITAL | Age: 69
End: 2024-04-18
Attending: EMERGENCY MEDICINE
Payer: COMMERCIAL

## 2024-04-18 ENCOUNTER — HOSPITAL ENCOUNTER (EMERGENCY)
Facility: HOSPITAL | Age: 69
Discharge: HOME | End: 2024-04-18
Attending: EMERGENCY MEDICINE | Admitting: EMERGENCY MEDICINE
Payer: COMMERCIAL

## 2024-04-18 VITALS
SYSTOLIC BLOOD PRESSURE: 108 MMHG | RESPIRATION RATE: 16 BRPM | HEART RATE: 91 BPM | OXYGEN SATURATION: 98 % | TEMPERATURE: 97.7 F | HEIGHT: 64 IN | BODY MASS INDEX: 30.9 KG/M2 | DIASTOLIC BLOOD PRESSURE: 63 MMHG | WEIGHT: 181 LBS
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
## 2024-04-18 DIAGNOSIS — S09.90XA INJURY OF HEAD, INITIAL ENCOUNTER: ICD-10-CM

## 2024-04-18 DIAGNOSIS — S01.01XA SCALP LACERATION, INITIAL ENCOUNTER: ICD-10-CM

## 2024-04-18 PROCEDURE — 72125 CT NECK SPINE W/O DYE: CPT

## 2024-04-18 PROCEDURE — 12001 RPR S/N/AX/GEN/TRNK 2.5CM/<: CPT

## 2024-04-18 PROCEDURE — 99284 EMERGENCY DEPT VISIT MOD MDM: CPT | Mod: 25

## 2024-04-18 PROCEDURE — 70450 CT HEAD/BRAIN W/O DYE: CPT

## 2024-04-18 ASSESSMENT — ACTIVITIES OF DAILY LIVING (ADL)
ADLS_ACUITY_SCORE: 35
ADLS_ACUITY_SCORE: 35

## 2024-04-18 ASSESSMENT — COLUMBIA-SUICIDE SEVERITY RATING SCALE - C-SSRS
2. HAVE YOU ACTUALLY HAD ANY THOUGHTS OF KILLING YOURSELF IN THE PAST MONTH?: NO
1. IN THE PAST MONTH, HAVE YOU WISHED YOU WERE DEAD OR WISHED YOU COULD GO TO SLEEP AND NOT WAKE UP?: NO
6. HAVE YOU EVER DONE ANYTHING, STARTED TO DO ANYTHING, OR PREPARED TO DO ANYTHING TO END YOUR LIFE?: NO

## 2024-04-18 NOTE — ED NOTES
Patient reports falling and hitting the back of her head while in the garage. She reports the fall was due to her cataracts. She reports no LOC and is not on blood thinners. She is alert and oriented and answering questions appropriately. The fall was unwitnessed, however a [employer]pair person arrived to see the patient had fallen and reported no LOC. Patient denies neck, back, or head pain, no pain with palpation. Patient also denies numbness, tingling, headache, and dizziness or lightheadedness. LEEANNARLA, 2mm. Provider assessment completed, c-collar not indicated.

## 2024-04-18 NOTE — ED TRIAGE NOTES
Patient arrives via [facility] with head laceration as a result of a fall and striking the back of her head. No loss of consciousness and denies neck and head pain. Additionally, no pain with palpation to c spine. Arrives alert and appropriate. .

## 2024-04-18 NOTE — ED NOTES
Bed: JN-03  Expected date:   Expected time:   Means of arrival: Ambulance  Comments:  [facility]: Fall, head lac

## 2024-04-18 NOTE — DISCHARGE INSTRUCTIONS
Please call and set up a primary care visit with your regular doctor for follow-up to your emergency department visit.  The laceration was repaired with absorbable sutures.  Return to emergency department if escalation your symptoms develop additional concern.

## 2024-04-18 NOTE — ED PROVIDER NOTES
EMERGENCY DEPARTMENT ENCOUNTER      NAME: Gabi Sandhu  AGE: 69 year old female  YOB: 1955  MRN: [id number]  EVALUATION DATE & TIME: 4/18/2024  9:04 AM    PCP: Shelbi Little    ED PROVIDER: Krzysztof Veliz MD       Chief Complaint   Patient presents with    Laceration     FINAL IMPRESSION:  1. Injury of head, initial encounter    2. Scalp laceration, initial encounter    3. Fall at home, initial encounter      ED COURSE & MEDICAL DECISION MAKING:    Pertinent Labs & Imaging studies reviewed. (See chart for details)  69 year old female presents to the Emergency Department for evaluation of injury sustained in a fall.  Patient has chronic issues with unsteadiness.  Reports she lost her balance on step and fell backwards striking the central portion of her scalp on a cement step.  No loss consciousness.  Mild headache and pain to the posterior occipital region of her scalp where there is an associated soft tissue contusion and 2 small lacerations.  Also some vague neck discomfort not clearly reproducible on clinical examination.  Neurologically she was intact.  Denied chest pain shortness of breath lightheadedness.  Remainder of her clinical examination was unremarkable.  CT scan imaging of her head and neck was negative for acute intracranial injury or cervical spine fracture.  I recommended suture closure of her lacerations and 2 stitches were placed to achieve wound approximation without complication.  Patient was feeling improved and requesting discharge.  At this point I did not feel that further workup was indicated.  Overall plan of care follow-up with her primary care doctor reviewed return precaution prior to discharge and wound care precautions.    At the conclusion of the encounter I discussed the results of all of the tests and the disposition. The questions were answered. The patient or family acknowledged understanding and was agreeable with the care plan.     Medical Decision  Making  Obtained supplemental history:Supplemental history obtained?: No  Reviewed external records: External records reviewed?: No  Care impacted by chronic illness:Diabetes, Hyperlipidemia, and Hypertension  Care significantly affected by social determinants of health:N/A  Did you consider but not order tests?: Work up considered but not performed and documented in chart, if applicable  Did you interpret images independently?: Independent interpretation of ECG and images noted in documentation, when applicable.  Consultation discussion with other provider:Did you involve another provider (consultant, , pharmacy, etc.)?: No  Discharge. No recommendations on prescription strength medication(s). See documentation for any additional details.    9:14 AM  I met with the patient to gather history and to perform my initial exam. I discussed the plan for care while in the Emergency Department.     MEDICATIONS GIVEN IN THE EMERGENCY:  Medications - No data to display    NEW PRESCRIPTIONS STARTED AT TODAY'S ER VISIT  [unfilled]       =================================================================    HPI    Patient information was obtained from: patient     Use of : N/A         Gabi Sandhu is a 69 year old female with a pertinent history of T2D, hypertension, hyperlipidemia who presents to this ED by EMS for evaluation of fall, laceration.    Patient reports feeling unbalanced for the past 3-4 weeks when she tries to focus her eyes. This morning, she felt unbalanced and fell while walking out the door, hitting her head on concrete step. Patient presents with a laceration to the back of her scalp. She endorses mild neck pain.     Patient denies LOC, headache, rib pain, chest pain, abdominal pain, extremity pain. Denies history of similar symptoms. She states her cataracts have been worsening for the past 3-4 weeks but denies a change from this baseline today. Patient states she has had decreased  strength in her extremities since positive COVID-19 in 2020 but denies a change from this as well from baseline.       REVIEW OF SYSTEMS   Review of Systems   See HPI.     PAST MEDICAL HISTORY:  No past medical history on file.    PAST SURGICAL HISTORY:  Past Surgical History:   Procedure Laterality Date    CHOLECYSTECTOMY      HYSTERECTOMY  1998    for dysfunctional uterine bleeding, no cancer    OOPHORECTOMY Bilateral 1998    OVARIAN CYST SURGERY      TONSILLECTOMY             CURRENT MEDICATIONS:    aspirin 81 MG EC tablet  atorvastatin (LIPITOR) 40 MG tablet  blood sugar diagnostic (ONETOUCH VERIO) Strp  cholecalciferol, vitamin D3, 2,000 unit Tab  lisinopril (ZESTRIL) 20 MG tablet  metFORMIN (GLUCOPHAGE XR) 500 MG 24 hr tablet  Omega-3 Fatty Acids (FISH OIL) 1200 MG capsule  omeprazole (PRILOSEC) 20 MG DR capsule  timolol maleate (TIMOPTIC) 0.5 % ophthalmic solution        ALLERGIES:  No Known Allergies    FAMILY HISTORY:  Family History   Problem Relation Age of Onset    Diabetes Mother     Thyroid Cancer Mother     Heart Disease Father         CHF    Diabetes Brother     Breast Cancer Paternal Aunt         early 40s    Prostate Cancer No family hx of     Cancer No family hx of     Colon Cancer No family hx of        SOCIAL HISTORY:   Social History     Socioeconomic History    Marital status: Single   Tobacco Use    Smoking status: Never    Smokeless tobacco: Never   Substance and Sexual Activity    Alcohol use: Yes     Alcohol/week: 12.0 - 15.0 standard drinks of alcohol     Comment: 6-7 per week    Drug use: Never    Sexual activity: Not Currently     Social Determinants of Health     Financial Resource Strain: Unknown (10/26/2023)    Financial Resource Strain     Within the past 12 months, have you or your family members you live with been unable to get utilities (heat, electricity) when it was really needed?: Patient refused   Food Insecurity: Unknown (10/26/2023)    Food Insecurity     Within the past 12  "months, did you worry that your food would run out before you got money to buy more?: Patient refused     Within the past 12 months, did the food you bought just not last and you didn t have money to get more?: Patient refused   Transportation Needs: Unknown (10/26/2023)    Transportation Needs     Within the past 12 months, has lack of transportation kept you from medical appointments, getting your medicines, non-medical meetings or appointments, work, or from getting things that you need?: Patient refused   Interpersonal Safety: Low Risk  (10/26/2023)    Interpersonal Safety     Do you feel physically and emotionally safe where you currently live?: Yes     Within the past 12 months, have you been hit, slapped, kicked or otherwise physically hurt by someone?: No     Within the past 12 months, have you been humiliated or emotionally abused in other ways by your partner or ex-partner?: No   Housing Stability: Unknown (10/26/2023)    Housing Stability     Do you have housing? : Patient refused     Are you worried about losing your housing?: Patient refused       VITALS:  /63   Pulse 91   Temp 97.7  F (36.5  C) (Oral)   Resp 16   Ht 1.626 m (5' 4\")   Wt 82.1 kg (181 lb)   LMP  (LMP Unknown)   SpO2 98%   BMI 31.07 kg/m      PHYSICAL EXAM    PHYSICAL EXAM    VITAL SIGNS: /63   Pulse 91   Temp 97.7  F (36.5  C) (Oral)   Resp 16   Ht 1.626 m (5' 4\")   Wt 82.1 kg (181 lb)   LMP  (LMP Unknown)   SpO2 98%   BMI 31.07 kg/m    Constitutional:  Well developed, well nourished, no acute distress, GCS = 15  Eyes:  PERRL, conjunctiva normal, anterior chambers clear, visual fields grossly normal   HENT: 2 small scalp lacerations roughly 1 cm in length with each laceration not actively bleeding small amount of soft tissue swelling to the supra region of the scalp  Respiratory:  Normal nonlabored respirations, normal pulmonary exam, no chest wall tenderness, no bruising, swelling, abrasion.  No " crepitus   Cardiovascular:  Regular rate and Rhythm, no murmur, normal capillary refill and normal distal perfusion  GI:  Soft, nondistended, nontender to palpation,  No wounds, bruising or abrasions evident, no organomegaly   :  No CVA tenderness  Musculoskeletal:  Full ROM, extremities nontender to palpation, no contusion, abrasions, or lacerations, no cervical, thoracic, or lumbar spine tenderness to palpation  Integument: Lacerations as noted above  Neurologic:  Alert, oriented, no focal motor or sensory deficit appreciated  Psych: Mood and affect normal    LAB:  All pertinent labs reviewed and interpreted.  Results for orders placed or performed during the hospital encounter of 04/18/24   Head CT w/o contrast    Impression    IMPRESSION:  HEAD CT:  1.  No acute intracranial abnormality.  2.  No calvarial or skull base fracture.    CERVICAL SPINE CT:  1.  No CT evidence for acute fracture or post traumatic subluxation.   CT Cervical Spine w/o Contrast    Impression    IMPRESSION:  HEAD CT:  1.  No acute intracranial abnormality.  2.  No calvarial or skull base fracture.    CERVICAL SPINE CT:  1.  No CT evidence for acute fracture or post traumatic subluxation.       RADIOLOGY:  Reviewed all pertinent imaging. Please see official radiology report.  CT Cervical Spine w/o Contrast   Final Result   IMPRESSION:   HEAD CT:   1.  No acute intracranial abnormality.   2.  No calvarial or skull base fracture.      CERVICAL SPINE CT:   1.  No CT evidence for acute fracture or post traumatic subluxation.      Head CT w/o contrast   Final Result   IMPRESSION:   HEAD CT:   1.  No acute intracranial abnormality.   2.  No calvarial or skull base fracture.      CERVICAL SPINE CT:   1.  No CT evidence for acute fracture or post traumatic subluxation.        PROCEDURES:   PROCEDURE: Laceration Repair   INDICATIONS: Laceration   PROCEDURE PROVIDER: Dr Krzysztof Veliz   SITE: Occipital scalp   TYPE/SIZE: simple, clean, and no  foreign body visualized  2 cm (total length)   FUNCTIONAL ASSESSMENT: Distal sensation, circulation, and motor intact   MEDICATION: 2 mLs of 1% Lidocaine with epinephrine   PREPARATION: scrubbing with Normal saline   DEBRIDEMENT: no debridement   CLOSURE:  Superficial layer closed with 2 stitches of 5-0 absorbable simple interrupted    Total number of sutures/staples placed: 2           I, Cory Hunt, am serving as a scribe to document services personally performed by Krzysztof Veliz MD based on my observation and the provider's statements to me. I, Krzysztof Veliz MD, attest that Cory Hunt is acting in a scribe capacity, has observed my performance of the services and has documented them in accordance with my direction.    Krzysztof Veliz MD  [facility] EMERGENCY DEPARTMENT  [address] [place] [id number] [phone]       Krzysztof Veliz MD  04/18/24 1111

## 2024-04-24 ENCOUNTER — PATIENT OUTREACH (OUTPATIENT)
Dept: CARE COORDINATION | Facility: CLINIC | Age: 69
End: 2024-04-24
Payer: COMMERCIAL

## 2024-04-29 ENCOUNTER — APPOINTMENT (OUTPATIENT)
Dept: CT IMAGING | Facility: HOSPITAL | Age: 69
DRG: 682 | End: 2024-04-29
Payer: COMMERCIAL

## 2024-04-29 ENCOUNTER — HOSPITAL ENCOUNTER (INPATIENT)
Facility: HOSPITAL | Age: 69
LOS: 4 days | Discharge: NURSING, REHAB OR LONG-TERM CARE | DRG: 682 | End: 2024-05-03
Attending: EMERGENCY MEDICINE | Admitting: STUDENT IN AN ORGANIZED HEALTH CARE EDUCATION/TRAINING PROGRAM
Payer: COMMERCIAL

## 2024-04-29 DIAGNOSIS — N17.9 AKI (ACUTE KIDNEY INJURY) (H): ICD-10-CM

## 2024-04-29 DIAGNOSIS — E83.42 HYPOMAGNESEMIA: ICD-10-CM

## 2024-04-29 DIAGNOSIS — N30.00 ACUTE CYSTITIS WITHOUT HEMATURIA: Primary | ICD-10-CM

## 2024-04-29 DIAGNOSIS — R29.6 FALLS FREQUENTLY: ICD-10-CM

## 2024-04-29 PROBLEM — E87.1 HYPONATREMIA: Status: ACTIVE | Noted: 2024-04-29

## 2024-04-29 LAB
ALBUMIN UR-MCNC: 50 MG/DL
ANION GAP SERPL CALCULATED.3IONS-SCNC: 22 MMOL/L (ref 7–15)
APPEARANCE UR: ABNORMAL
B-OH-BUTYR SERPL-SCNC: 2.7 MMOL/L
BACTERIA #/AREA URNS HPF: ABNORMAL /HPF
BASE EXCESS BLDA CALC-SCNC: -9.8 MMOL/L (ref -3–3)
BASOPHILS # BLD AUTO: 0.1 10E3/UL (ref 0–0.2)
BASOPHILS NFR BLD AUTO: 1 %
BILIRUB UR QL STRIP: NEGATIVE
BUN SERPL-MCNC: 34.4 MG/DL (ref 8–23)
CALCIUM SERPL-MCNC: 10.2 MG/DL (ref 8.8–10.2)
CHLORIDE SERPL-SCNC: 89 MMOL/L (ref 98–107)
CK SERPL-CCNC: 79 U/L (ref 26–192)
COHGB MFR BLD: 99.1 % (ref 95–96)
COLOR UR AUTO: YELLOW
CREAT SERPL-MCNC: 2.3 MG/DL (ref 0.51–0.95)
CREAT SERPL-MCNC: 2.38 MG/DL (ref 0.51–0.95)
CREAT UR-MCNC: 100.3 MG/DL
D DIMER PPP FEU-MCNC: 2.22 UG/ML FEU (ref 0–0.5)
DEPRECATED HCO3 PLAS-SCNC: 17 MMOL/L (ref 22–29)
EGFRCR SERPLBLD CKD-EPI 2021: 21 ML/MIN/1.73M2
EOSINOPHIL # BLD AUTO: 0.1 10E3/UL (ref 0–0.7)
EOSINOPHIL NFR BLD AUTO: 1 %
ERYTHROCYTE [DISTWIDTH] IN BLOOD BY AUTOMATED COUNT: 13.5 % (ref 10–15)
FRACT EXCRET NA UR+SERPL-RTO: 1.2 %
GLUCOSE BLDC GLUCOMTR-MCNC: 105 MG/DL (ref 70–99)
GLUCOSE BLDC GLUCOMTR-MCNC: 96 MG/DL (ref 70–99)
GLUCOSE SERPL-MCNC: 145 MG/DL (ref 70–99)
GLUCOSE UR STRIP-MCNC: NEGATIVE MG/DL
HBA1C MFR BLD: 5.6 %
HCO3 BLD-SCNC: 16 MMOL/L (ref 21–28)
HCT VFR BLD AUTO: 36 % (ref 35–47)
HGB BLD-MCNC: 12.8 G/DL (ref 11.7–15.7)
HGB UR QL STRIP: ABNORMAL
HOLD SPECIMEN: NORMAL
IMM GRANULOCYTES # BLD: 0 10E3/UL
IMM GRANULOCYTES NFR BLD: 0 %
KETONES UR STRIP-MCNC: NEGATIVE MG/DL
LACTATE SERPL-SCNC: 0.9 MMOL/L (ref 0.7–2)
LEUKOCYTE ESTERASE UR QL STRIP: ABNORMAL
LYMPHOCYTES # BLD AUTO: 2.2 10E3/UL (ref 0.8–5.3)
LYMPHOCYTES NFR BLD AUTO: 31 %
MAGNESIUM SERPL-MCNC: 1 MG/DL (ref 1.7–2.3)
MCH RBC QN AUTO: 32.9 PG (ref 26.5–33)
MCHC RBC AUTO-ENTMCNC: 35.6 G/DL (ref 31.5–36.5)
MCV RBC AUTO: 93 FL (ref 78–100)
MONOCYTES # BLD AUTO: 0.4 10E3/UL (ref 0–1.3)
MONOCYTES NFR BLD AUTO: 6 %
NEUTROPHILS # BLD AUTO: 4.2 10E3/UL (ref 1.6–8.3)
NEUTROPHILS NFR BLD AUTO: 61 %
NITRATE UR QL: NEGATIVE
NRBC # BLD AUTO: 0 10E3/UL
NRBC BLD AUTO-RTO: 0 /100
O2/TOTAL GAS SETTING VFR VENT: 21 %
OSMOLALITY UR: 272 MMOL/KG (ref 100–1200)
PCO2 BLD: 27 MM HG (ref 35–45)
PH BLD: 7.36 [PH] (ref 7.35–7.45)
PH UR STRIP: 5.5 [PH] (ref 5–7)
PLATELET # BLD AUTO: 182 10E3/UL (ref 150–450)
PO2 BLD: 110 MM HG (ref 80–105)
POTASSIUM SERPL-SCNC: 4 MMOL/L (ref 3.4–5.3)
RBC # BLD AUTO: 3.89 10E6/UL (ref 3.8–5.2)
RBC URINE: 0 /HPF
SAO2 % BLDA: 98 % (ref 92–100)
SODIUM SERPL-SCNC: 128 MMOL/L (ref 135–145)
SODIUM SERPL-SCNC: 128 MMOL/L (ref 135–145)
SODIUM UR-SCNC: 66 MMOL/L
SODIUM UR-SCNC: 67 MMOL/L
SP GR UR STRIP: 1.01 (ref 1–1.03)
SQUAMOUS EPITHELIAL: 3 /HPF
TROPONIN T SERPL HS-MCNC: 56 NG/L
TROPONIN T SERPL HS-MCNC: 58 NG/L
UROBILINOGEN UR STRIP-MCNC: <2 MG/DL
WBC # BLD AUTO: 6.9 10E3/UL (ref 4–11)
WBC CLUMPS #/AREA URNS HPF: PRESENT /HPF
WBC URINE: >182 /HPF

## 2024-04-29 PROCEDURE — 87186 SC STD MICRODIL/AGAR DIL: CPT

## 2024-04-29 PROCEDURE — 83036 HEMOGLOBIN GLYCOSYLATED A1C: CPT | Performed by: STUDENT IN AN ORGANIZED HEALTH CARE EDUCATION/TRAINING PROGRAM

## 2024-04-29 PROCEDURE — 82962 GLUCOSE BLOOD TEST: CPT

## 2024-04-29 PROCEDURE — 36415 COLL VENOUS BLD VENIPUNCTURE: CPT

## 2024-04-29 PROCEDURE — 84484 ASSAY OF TROPONIN QUANT: CPT

## 2024-04-29 PROCEDURE — 93005 ELECTROCARDIOGRAM TRACING: CPT | Performed by: STUDENT IN AN ORGANIZED HEALTH CARE EDUCATION/TRAINING PROGRAM

## 2024-04-29 PROCEDURE — 82805 BLOOD GASES W/O2 SATURATION: CPT | Performed by: STUDENT IN AN ORGANIZED HEALTH CARE EDUCATION/TRAINING PROGRAM

## 2024-04-29 PROCEDURE — 82565 ASSAY OF CREATININE: CPT | Performed by: STUDENT IN AN ORGANIZED HEALTH CARE EDUCATION/TRAINING PROGRAM

## 2024-04-29 PROCEDURE — 82010 KETONE BODYS QUAN: CPT

## 2024-04-29 PROCEDURE — 96365 THER/PROPH/DIAG IV INF INIT: CPT | Mod: 59

## 2024-04-29 PROCEDURE — 82550 ASSAY OF CK (CPK): CPT | Performed by: STUDENT IN AN ORGANIZED HEALTH CARE EDUCATION/TRAINING PROGRAM

## 2024-04-29 PROCEDURE — 99223 1ST HOSP IP/OBS HIGH 75: CPT | Performed by: STUDENT IN AN ORGANIZED HEALTH CARE EDUCATION/TRAINING PROGRAM

## 2024-04-29 PROCEDURE — 258N000003 HC RX IP 258 OP 636

## 2024-04-29 PROCEDURE — 84300 ASSAY OF URINE SODIUM: CPT | Performed by: STUDENT IN AN ORGANIZED HEALTH CARE EDUCATION/TRAINING PROGRAM

## 2024-04-29 PROCEDURE — 250N000013 HC RX MED GY IP 250 OP 250 PS 637: Performed by: STUDENT IN AN ORGANIZED HEALTH CARE EDUCATION/TRAINING PROGRAM

## 2024-04-29 PROCEDURE — 120N000001 HC R&B MED SURG/OB

## 2024-04-29 PROCEDURE — 83935 ASSAY OF URINE OSMOLALITY: CPT | Performed by: STUDENT IN AN ORGANIZED HEALTH CARE EDUCATION/TRAINING PROGRAM

## 2024-04-29 PROCEDURE — 72192 CT PELVIS W/O DYE: CPT

## 2024-04-29 PROCEDURE — 99285 EMERGENCY DEPT VISIT HI MDM: CPT | Mod: 25

## 2024-04-29 PROCEDURE — 87149 DNA/RNA DIRECT PROBE: CPT

## 2024-04-29 PROCEDURE — 82570 ASSAY OF URINE CREATININE: CPT | Performed by: STUDENT IN AN ORGANIZED HEALTH CARE EDUCATION/TRAINING PROGRAM

## 2024-04-29 PROCEDURE — 87086 URINE CULTURE/COLONY COUNT: CPT

## 2024-04-29 PROCEDURE — 80048 BASIC METABOLIC PNL TOTAL CA: CPT

## 2024-04-29 PROCEDURE — 84295 ASSAY OF SERUM SODIUM: CPT | Performed by: STUDENT IN AN ORGANIZED HEALTH CARE EDUCATION/TRAINING PROGRAM

## 2024-04-29 PROCEDURE — 81001 URINALYSIS AUTO W/SCOPE: CPT

## 2024-04-29 PROCEDURE — 70450 CT HEAD/BRAIN W/O DYE: CPT

## 2024-04-29 PROCEDURE — 999N000157 HC STATISTIC RCP TIME EA 10 MIN

## 2024-04-29 PROCEDURE — 83605 ASSAY OF LACTIC ACID: CPT

## 2024-04-29 PROCEDURE — 85025 COMPLETE CBC W/AUTO DIFF WBC: CPT

## 2024-04-29 PROCEDURE — 258N000003 HC RX IP 258 OP 636: Performed by: STUDENT IN AN ORGANIZED HEALTH CARE EDUCATION/TRAINING PROGRAM

## 2024-04-29 PROCEDURE — 36415 COLL VENOUS BLD VENIPUNCTURE: CPT | Performed by: EMERGENCY MEDICINE

## 2024-04-29 PROCEDURE — 36600 WITHDRAWAL OF ARTERIAL BLOOD: CPT

## 2024-04-29 PROCEDURE — 83735 ASSAY OF MAGNESIUM: CPT

## 2024-04-29 PROCEDURE — 250N000011 HC RX IP 250 OP 636

## 2024-04-29 PROCEDURE — 93005 ELECTROCARDIOGRAM TRACING: CPT | Performed by: EMERGENCY MEDICINE

## 2024-04-29 PROCEDURE — 85379 FIBRIN DEGRADATION QUANT: CPT | Performed by: STUDENT IN AN ORGANIZED HEALTH CARE EDUCATION/TRAINING PROGRAM

## 2024-04-29 RX ORDER — ATORVASTATIN CALCIUM 40 MG/1
40 TABLET, FILM COATED ORAL EVERY EVENING
Status: DISCONTINUED | OUTPATIENT
Start: 2024-04-29 | End: 2024-05-03 | Stop reason: HOSPADM

## 2024-04-29 RX ORDER — ONDANSETRON 2 MG/ML
4 INJECTION INTRAMUSCULAR; INTRAVENOUS EVERY 6 HOURS PRN
Status: DISCONTINUED | OUTPATIENT
Start: 2024-04-29 | End: 2024-05-03 | Stop reason: HOSPADM

## 2024-04-29 RX ORDER — CEFTRIAXONE 1 G/1
1000 INJECTION, POWDER, FOR SOLUTION INTRAMUSCULAR; INTRAVENOUS ONCE
Status: COMPLETED | OUTPATIENT
Start: 2024-04-29 | End: 2024-04-29

## 2024-04-29 RX ORDER — CEFTRIAXONE 1 G/1
1 INJECTION, POWDER, FOR SOLUTION INTRAMUSCULAR; INTRAVENOUS EVERY 24 HOURS
Status: DISCONTINUED | OUTPATIENT
Start: 2024-04-30 | End: 2024-05-03 | Stop reason: HOSPADM

## 2024-04-29 RX ORDER — AMOXICILLIN 250 MG
2 CAPSULE ORAL 2 TIMES DAILY PRN
Status: DISCONTINUED | OUTPATIENT
Start: 2024-04-29 | End: 2024-05-03 | Stop reason: HOSPADM

## 2024-04-29 RX ORDER — ASPIRIN 81 MG/1
81 TABLET ORAL DAILY
Status: DISCONTINUED | OUTPATIENT
Start: 2024-04-30 | End: 2024-05-03 | Stop reason: HOSPADM

## 2024-04-29 RX ORDER — DEXTROSE MONOHYDRATE 50 MG/ML
INJECTION, SOLUTION INTRAVENOUS CONTINUOUS
Status: DISCONTINUED | OUTPATIENT
Start: 2024-04-29 | End: 2024-04-29

## 2024-04-29 RX ORDER — HYDRALAZINE HYDROCHLORIDE 20 MG/ML
10 INJECTION INTRAMUSCULAR; INTRAVENOUS EVERY 4 HOURS PRN
Status: DISCONTINUED | OUTPATIENT
Start: 2024-04-29 | End: 2024-05-03 | Stop reason: HOSPADM

## 2024-04-29 RX ORDER — HYDRALAZINE HYDROCHLORIDE 10 MG/1
10 TABLET, FILM COATED ORAL EVERY 4 HOURS PRN
Status: DISCONTINUED | OUTPATIENT
Start: 2024-04-29 | End: 2024-05-03 | Stop reason: HOSPADM

## 2024-04-29 RX ORDER — LIDOCAINE 40 MG/G
CREAM TOPICAL
Status: DISCONTINUED | OUTPATIENT
Start: 2024-04-29 | End: 2024-05-03 | Stop reason: HOSPADM

## 2024-04-29 RX ORDER — DEXTROSE MONOHYDRATE 25 G/50ML
25-50 INJECTION, SOLUTION INTRAVENOUS
Status: DISCONTINUED | OUTPATIENT
Start: 2024-04-29 | End: 2024-05-03 | Stop reason: HOSPADM

## 2024-04-29 RX ORDER — SODIUM CHLORIDE 9 MG/ML
INJECTION, SOLUTION INTRAVENOUS CONTINUOUS
Status: DISCONTINUED | OUTPATIENT
Start: 2024-04-29 | End: 2024-05-01

## 2024-04-29 RX ORDER — TIMOLOL MALEATE 5 MG/ML
1 SOLUTION/ DROPS OPHTHALMIC EVERY MORNING
Status: DISCONTINUED | OUTPATIENT
Start: 2024-04-30 | End: 2024-05-03 | Stop reason: HOSPADM

## 2024-04-29 RX ORDER — MAGNESIUM SULFATE HEPTAHYDRATE 40 MG/ML
2 INJECTION, SOLUTION INTRAVENOUS ONCE
Status: COMPLETED | OUTPATIENT
Start: 2024-04-29 | End: 2024-04-29

## 2024-04-29 RX ORDER — AMOXICILLIN 250 MG
1 CAPSULE ORAL 2 TIMES DAILY PRN
Status: DISCONTINUED | OUTPATIENT
Start: 2024-04-29 | End: 2024-05-03 | Stop reason: HOSPADM

## 2024-04-29 RX ORDER — ONDANSETRON 4 MG/1
4 TABLET, ORALLY DISINTEGRATING ORAL EVERY 6 HOURS PRN
Status: DISCONTINUED | OUTPATIENT
Start: 2024-04-29 | End: 2024-05-03 | Stop reason: HOSPADM

## 2024-04-29 RX ORDER — NICOTINE POLACRILEX 4 MG
15-30 LOZENGE BUCCAL
Status: DISCONTINUED | OUTPATIENT
Start: 2024-04-29 | End: 2024-05-03 | Stop reason: HOSPADM

## 2024-04-29 RX ORDER — PANTOPRAZOLE SODIUM 40 MG/1
40 TABLET, DELAYED RELEASE ORAL
Status: DISCONTINUED | OUTPATIENT
Start: 2024-04-30 | End: 2024-05-03 | Stop reason: HOSPADM

## 2024-04-29 RX ORDER — ACETAMINOPHEN 650 MG/1
650 SUPPOSITORY RECTAL EVERY 4 HOURS PRN
Status: DISCONTINUED | OUTPATIENT
Start: 2024-04-29 | End: 2024-05-03 | Stop reason: HOSPADM

## 2024-04-29 RX ORDER — ACETAMINOPHEN 325 MG/1
650 TABLET ORAL EVERY 4 HOURS PRN
Status: DISCONTINUED | OUTPATIENT
Start: 2024-04-29 | End: 2024-05-03 | Stop reason: HOSPADM

## 2024-04-29 RX ADMIN — ATORVASTATIN CALCIUM 40 MG: 40 TABLET, FILM COATED ORAL at 21:53

## 2024-04-29 RX ADMIN — MAGNESIUM SULFATE HEPTAHYDRATE 2 G: 40 INJECTION, SOLUTION INTRAVENOUS at 15:00

## 2024-04-29 RX ADMIN — SODIUM CHLORIDE, POTASSIUM CHLORIDE, SODIUM LACTATE AND CALCIUM CHLORIDE 500 ML: 600; 310; 30; 20 INJECTION, SOLUTION INTRAVENOUS at 15:13

## 2024-04-29 RX ADMIN — SODIUM CHLORIDE 500 ML: 9 INJECTION, SOLUTION INTRAVENOUS at 18:51

## 2024-04-29 RX ADMIN — CEFTRIAXONE 1000 MG: 1 INJECTION, POWDER, FOR SOLUTION INTRAMUSCULAR; INTRAVENOUS at 16:58

## 2024-04-29 RX ADMIN — SODIUM CHLORIDE: 9 INJECTION, SOLUTION INTRAVENOUS at 18:52

## 2024-04-29 ASSESSMENT — ACTIVITIES OF DAILY LIVING (ADL)
ADLS_ACUITY_SCORE: 40
ADLS_ACUITY_SCORE: 35
ADLS_ACUITY_SCORE: 38
ADLS_ACUITY_SCORE: 38
ADLS_ACUITY_SCORE: 40
ADLS_ACUITY_SCORE: 38
ADLS_ACUITY_SCORE: 40

## 2024-04-29 NOTE — ED PROVIDER NOTES
EMERGENCY DEPARTMENT ENCOUNTER      NAME: Gabi Sandhu  AGE: 69 year old female  YOB: 1955  MRN: [id number]  EVALUATION DATE & TIME: 4/29/2024  1:37 PM    PCP: Shelbi Little    ED PROVIDER: Mildred Rose PA-C      Chief Complaint   Patient presents with    Fall    Generalized Weakness     FINAL IMPRESSION:  1. Hyponatremia    2. Hypomagnesemia    3. GILSON (acute kidney injury) (H24)    4. Falls frequently      ED COURSE & MEDICAL DECISION MAKING:    Pertinent Labs & Imaging studies reviewed. (See chart for details)  69 year old female presents to the Emergency Department for evaluation of generalized weakness.  For the past several weeks patient has had increased falls due to bilateral lower leg weakness.  Patient reports that she is fallen multiple times at home and called EMS to help her get up off the floor.  Patient was seen on 4/18/2024 for a fall.  At that time imaging was unremarkable and patient was discharged home.  This morning patient was walking in her [place] when she suddenly felt lightheaded and weak.  Both of her legs gave out and patient fell onto the floor.  Patient hit her butt.  She reports no loss of consciousness or hitting her head.  No blood thinners. Vital signs reviewed and unremarkable.  Afebrile. En route patient's blood pressure was 80/50.  Bp improved throughout the ED visit. On exam patient moves all 4 extremities without difficulty.  Normal range of motion, sensation and strength of all 4 extremities.  GCS 15.  Cranial nerves II through XII intact.  Cardiac and pulm exams unremarkable.  4 extremities are nontender to palpation.  Abdomen is soft and nontender.    Differential diagnosis includes intracranial hemorrhage, subdural hematoma, epidural hematoma, stroke, electrolyte abnormality, UTI, DKA.  CBC shows no white blood cell elevation.  Hemoglobin is stable.  Sodium is 129.  Anion gap of 22.  Creatinine is 2.38 with a GFR of 21.  CO2 is 17.  Glucose is 145.  Ketone is 2.70.  Initial troponin was 56.  Delta troponin is pending.  EKG NSR.  Magnesium is 1.0.  Magnesium was replacement was ordered for the patient. Lactic was not elevated.  Blood cultures are pending.  UA is shows leukocytes. Rocephin ordered.  CT shows no evidence of acute intercranial process.  Brain atrophy and presumed chronic microvascular ischemic changes.  CT of the pelvis bone shows no acute fracture.  Avascular necrosis of the femoral heads bilaterally without articular surface collapse.  Mild degenerative arthrosis of both hips.  I spoke with Ortho about the CT of the pelvis findings who reports that they do not do anything for her CT findings.  Fluids ordered for the patient. I spoke with the hospitalist who agrees to admit the patient for UTI, GILSON, hypomagnesia.  I spoke with patient and updated the patient on her results.  Patient agrees with plan.  All questions answered.      ED COURSE:   2:11 PM  I saw the patient. Staffed with Dr. Vyas.   3:45 PM Spoke with Ortho  3:56 PM I checked on the patient.  Is educated on her results.  Patient will be admitted.  Patient agrees with plan.  All questions answered.  4:25 PM  I spoke with the hospitalist who agrees to admit the patient.   ED Course as of 04/29/24 1615   Mon Apr 29, 2024   1422 QUIN supervisory note: 68 yo F who fell. Increased frequency and weakness. Was 80/50 for medics, now 109/63. Has had a lot of falls of late   1508 EKG shows sinus rhythm rate 89.  No ischemic ST or T wave morphology.  Normal axis, normal intervals.  Compared to prior EKG on April 10, 2000, there is no significant change.  Impression: Sinus rhythm with rate of 89.       At the conclusion of the encounter I discussed the results of all of the tests and the disposition. The questions were answered. The patient or family acknowledged understanding and was agreeable with the care plan.     0 minutes of critical care time       Medical Decision Making  Obtained  supplemental history:Supplemental history obtained?: No  Reviewed external records: External records reviewed?: No  Care impacted by chronic illness:N/A  Care significantly affected by social determinants of health:N/A  Did you consider but not order tests?: Work up considered but not performed and documented in chart, if applicable  Did you interpret images independently?: Independent interpretation of ECG and images noted in documentation, when applicable.  Consultation discussion with other provider:Did you involve another provider (consultant, , pharmacy, etc.)?: I discussed the care with another health care provider, see documentation for details.  Admit.      MEDICATIONS GIVEN IN THE EMERGENCY:  Medications   lactated ringers BOLUS 500 mL (500 mLs Intravenous $New Bag 4/29/24 1513)   dextrose 5% infusion (has no administration in time range)   magnesium sulfate 2 g in 50 mL sterile water intermittent infusion (0 g Intravenous Stopped 4/29/24 1604)       NEW PRESCRIPTIONS STARTED AT TODAY'S ER VISIT  New Prescriptions    No medications on file          =================================================================    HPI    Patient information was obtained from: patient    Use of : N/A         Gabi Sandhu is a 69 year old female with a pertinent history of pretension, type 2 diabetes, morbid obesity, hyperlipidemia who presents to this ED for evaluation of weakness.    Over the past couple weeks patient has had increased falls due to bilateral lower leg weakness.  Patient reports that she is called EMS multiple times to have them help her get up off the floor.  Patient reports that she has refused transfer to the hospital after her falls.  Patient was seen on 4/18/2024 for a fall.  Imaging at that time was unremarkable.  Patient was discharged home.  Just prior to arrival patient was walking around her [facility] when she suddenly developed lightheadedness and bilateral lower leg weakness.   Patient fell to the ground.  Patient landed on her butt.  Patient reports that she did not hit her head or lose consciousness.  Patient denies being on blood thinners.  And route to the emergency room patient's blood pressure was 80/50.    Denies room spinning dizziness this, vision changes, nausea, vomiting, chest pain, shortness of breath, fevers, chills, abdominal pain, urinary symptoms.      REVIEW OF SYSTEMS   As per HPI    PAST MEDICAL HISTORY:  No past medical history on file.    PAST SURGICAL HISTORY:  Past Surgical History:   Procedure Laterality Date    CHOLECYSTECTOMY      HYSTERECTOMY  1998    for dysfunctional uterine bleeding, no cancer    OOPHORECTOMY Bilateral 1998    OVARIAN CYST SURGERY      TONSILLECTOMY             CURRENT MEDICATIONS:    aspirin 81 MG EC tablet  atorvastatin (LIPITOR) 40 MG tablet  cholecalciferol, vitamin D3, 2,000 unit Tab  lisinopril (ZESTRIL) 20 MG tablet  Omega-3 Fatty Acids (FISH OIL) 1200 MG capsule  omeprazole (PRILOSEC) 20 MG DR capsule  timolol maleate (TIMOPTIC) 0.5 % ophthalmic solution  blood sugar diagnostic (ONETOUCH VERIO) Strp        ALLERGIES:  No Known Allergies    FAMILY HISTORY:  Family History   Problem Relation Age of Onset    Diabetes Mother     Thyroid Cancer Mother     Heart Disease Father         CHF    Diabetes Brother     Breast Cancer Paternal Aunt         early 40s    Prostate Cancer No family hx of     Cancer No family hx of     Colon Cancer No family hx of        SOCIAL HISTORY:   Social History     Socioeconomic History    Marital status: Single   Tobacco Use    Smoking status: Never    Smokeless tobacco: Never   Substance and Sexual Activity    Alcohol use: Yes     Alcohol/week: 12.0 - 15.0 standard drinks of alcohol     Comment: 6-7 per week    Drug use: Never    Sexual activity: Not Currently     Social Determinants of Health     Financial Resource Strain: Unknown (10/26/2023)    Financial Resource Strain     Within the past 12 months, have  "you or your family members you live with been unable to get utilities (heat, electricity) when it was really needed?: Patient refused   Food Insecurity: Unknown (10/26/2023)    Food Insecurity     Within the past 12 months, did you worry that your food would run out before you got money to buy more?: Patient refused     Within the past 12 months, did the food you bought just not last and you didn t have money to get more?: Patient refused   Transportation Needs: Unknown (10/26/2023)    Transportation Needs     Within the past 12 months, has lack of transportation kept you from medical appointments, getting your medicines, non-medical meetings or appointments, work, or from getting things that you need?: Patient refused   Interpersonal Safety: Low Risk  (10/26/2023)    Interpersonal Safety     Do you feel physically and emotionally safe where you currently live?: Yes     Within the past 12 months, have you been hit, slapped, kicked or otherwise physically hurt by someone?: No     Within the past 12 months, have you been humiliated or emotionally abused in other ways by your partner or ex-partner?: No   Housing Stability: Unknown (10/26/2023)    Housing Stability     Do you have housing? : Patient refused     Are you worried about losing your housing?: Patient refused       VITALS:  /64   Pulse 91   Temp 97.4  F (36.3  C) (Oral)   Resp 17   Ht 1.626 m (5' 4\")   Wt 82.1 kg (181 lb)   LMP  (LMP Unknown)   SpO2 100%   BMI 31.07 kg/m      PHYSICAL EXAM    Physical Exam  Vitals and nursing note reviewed.   Constitutional:       Appearance: Normal appearance.   HENT:      Head: Atraumatic.      Right Ear: External ear normal.      Left Ear: External ear normal.      Nose: Nose normal.      Mouth/Throat:      Mouth: Mucous membranes are moist.   Eyes:      Conjunctiva/sclera: Conjunctivae normal.      Pupils: Pupils are equal, round, and reactive to light.   Cardiovascular:      Rate and Rhythm: Normal rate and " regular rhythm.      Pulses: Normal pulses.      Heart sounds: Normal heart sounds. No murmur heard.     No friction rub. No gallop.   Pulmonary:      Effort: Pulmonary effort is normal.      Breath sounds: Normal breath sounds. No wheezing or rales.   Abdominal:      Tenderness: There is no abdominal tenderness. There is no guarding or rebound.   Musculoskeletal:      Cervical back: Normal range of motion.      Comments: Scalp is nontender to palpation.  Cervical, thoracic, lumbar, sacral paraspinal muscles and spinous process are nontender to palpation.  All 4 extremities are nontender to palpation.  Normal range of motion, sensation and strength throughout.  Pulses are 2+ bilaterally.  Normal capillary refill.   Skin:     General: Skin is dry.      Capillary Refill: Capillary refill takes less than 2 seconds.   Neurological:      General: No focal deficit present.      Mental Status: She is alert and oriented to person, place, and time. Mental status is at baseline.      GCS: GCS eye subscore is 4. GCS verbal subscore is 5. GCS motor subscore is 6.      Cranial Nerves: Cranial nerves 2-12 are intact.      Sensory: Sensation is intact.      Motor: Motor function is intact.      Coordination: Coordination is intact.   Psychiatric:         Mood and Affect: Mood normal.         Thought Content: Thought content normal.          LAB:  All pertinent labs reviewed and interpreted.  Labs Ordered and Resulted from Time of ED Arrival to Time of ED Departure   BASIC METABOLIC PANEL - Abnormal       Result Value    Sodium 128 (*)     Potassium 4.0      Chloride 89 (*)     Carbon Dioxide (CO2) 17 (*)     Anion Gap 22 (*)     Urea Nitrogen 34.4 (*)     Creatinine 2.38 (*)     GFR Estimate 21 (*)     Calcium 10.2      Glucose 145 (*)    TROPONIN T, HIGH SENSITIVITY - Abnormal    Troponin T, High Sensitivity 56 (*)    MAGNESIUM - Abnormal    Magnesium 1.0 (*)    KETONE BETA-HYDROXYBUTYRATE QUANTITATIVE, RAPID - Abnormal    Ketone  (Beta-Hydroxybutyrate) Quantitative 2.70 (*)    LACTIC ACID WHOLE BLOOD - Normal    Lactic Acid 0.9     CBC WITH PLATELETS AND DIFFERENTIAL    WBC Count 6.9      RBC Count 3.89      Hemoglobin 12.8      Hematocrit 36.0      MCV 93      MCH 32.9      MCHC 35.6      RDW 13.5      Platelet Count 182      % Neutrophils 61      % Lymphocytes 31      % Monocytes 6      % Eosinophils 1      % Basophils 1      % Immature Granulocytes 0      NRBCs per 100 WBC 0      Absolute Neutrophils 4.2      Absolute Lymphocytes 2.2      Absolute Monocytes 0.4      Absolute Eosinophils 0.1      Absolute Basophils 0.1      Absolute Immature Granulocytes 0.0      Absolute NRBCs 0.0     ROUTINE UA WITH MICROSCOPIC REFLEX TO CULTURE   TROPONIN T, HIGH SENSITIVITY   BLOOD CULTURE   BLOOD CULTURE        RADIOLOGY:  Reviewed all pertinent imaging. Please see official radiology report.  CT Pelvis Bone wo Contrast   Final Result   IMPRESSION:   1.  No acute fracture.   2.  Avascular necrosis of the femoral heads bilaterally without articular surface collapse.   3.  Mild degenerative arthrosis both hips.      Head CT w/o contrast   Final Result   IMPRESSION:   1.  No CT evidence for acute intracranial process.   2.  Brain atrophy and presumed chronic microvascular ischemic changes as above.          EKG:    Performed at: 14:01 4/29/24    Impression: Sinus rhythm.  Normal EKG.  When compared to EKG done on April 10, 2000 Vent rate has increased by 31 bpm.  Nonspecific T wave abnormality worse in the anterior leads.    Rate: 89  Rhythm: Sinus  Axis: 70 31 81  IL Interval: None  QRS Interval: 74  QTc Interval: 447  ST Changes: None  Dr. Vyas have independently reviewed and interpreted the EKG(s) documented above.      Mildred Rose PA-C  [facility] EMERGENCY DEPARTMENT  [address] [place] [id number] [phone]     Mildred Rose PA-C  04/29/24 6247       Mildred Rose PA-C  04/29/24  1637       Mildred Rose PA-C  04/29/24 1316

## 2024-04-29 NOTE — ED TRIAGE NOTES
Pt arrives via [facility] from home after a fall. Pt reports generalized weakness for the past few weeks and endorses feeling lightheaded before falling today. Denies LOC. Per report from EMS, pt has called 911 multiple times in the last month for falls with lift assists. BP intially 80/40, resolved with 250cc bolus. A&Ox4 on arrival     Triage Assessment (Adult)       Row Name 04/29/24 1339          Triage Assessment    Airway WDL WDL        Respiratory WDL    Respiratory WDL WDL        Skin Circulation/Temperature WDL    Skin Circulation/Temperature WDL WDL        Cardiac WDL    Cardiac WDL WDL        Peripheral/Neurovascular WDL    Peripheral Neurovascular WDL WDL        Cognitive/Neuro/Behavioral WDL    Cognitive/Neuro/Behavioral WDL WDL

## 2024-04-29 NOTE — ED PROVIDER NOTES
Emergency Department Midlevel Supervisory Note     I had a face to face encounter with this patient seen by the Advanced Practice Provider (QUIN). I personally made/approved the management plan and take responsibility for the patient management. I personally saw patient and performed a substantive portion of the visit including all aspects of the medical decision making.     ED Course:  2:25 PM Mildred Rose PA-C staffed patient with me. I agree with their assessment and plan of management, and I will see the patient.  4:13 PM I met with the patient to introduce myself, gather additional history, perform my initial exam, and discuss the plan.     Brief HPI:     Gabi Sandhu is a 69 year old female who presents for evaluation of weakness.     Over the past couple weeks patient has had increased falls due to bilateral lower leg weakness.  Patient reports that she is called EMS multiple times to have them help her get up off the floor.  Patient reports that she has refused transfer to the hospital after her falls.  Patient was seen on 4/18/2024 for a fall.  Imaging at that time was unremarkable.  Patient was discharged home.  Just prior to arrival patient was walking around her [facility] when she suddenly developed lightheadedness and bilateral lower leg weakness.  Patient fell to the ground.  Patient landed on her butt.  Patient reports that she did not hit her head or lose consciousness.  Patient denies being on blood thinners.  And route to the emergency room patient's blood pressure was 80/50.    I, Juan Alberto Marie, am serving as a scribe to document services personally performed by Grady Vyas MD, based on my observations and the provider's statements to me.   I, Grady Vyas MD attest that Juan Alberto Marie was acting in a scribe capacity, has observed my performance of the services and has documented them in accordance with my direction.    Brief Physical Exam: /64   Pulse 91   Temp 97.4  F  "(36.3  C) (Oral)   Resp 17   Ht 1.626 m (5' 4\")   Wt 82.1 kg (181 lb)   LMP  (LMP Unknown)   SpO2 100%   BMI 31.07 kg/m    Constitutional:  Alert, in no acute distress  EYES: Conjunctivae clear  HENT:  Atraumatic  Respiratory:  Respirations even, unlabored, in no acute respiratory distress  Cardiovascular:  Regular rate and rhythm, good peripheral perfusion  GI: Soft, non-distended, non-tender  Musculoskeletal:  Moves all 4 extremities equally, grossly symmetrical strength  Integument: Warm & dry. No appreciable rash, erythema.  Neurologic:  Alert & oriented, speech clear and fluent, no focal deficits noted  Psych: Normal mood and affect       MDM:    ED Course as of 04/29/24 1613   Mon Apr 29, 2024   1422 QUIN supervisory note: 70 yo F who fell. Increased frequency and weakness. Was 80/50 for medics, now 109/63. Has had a lot of falls of late   1508 EKG shows sinus rhythm rate 89.  No ischemic ST or T wave morphology.  Normal axis, normal intervals.  Compared to prior EKG on April 10, 2000, there is no significant change.  Impression: Sinus rhythm with rate of 89.   Blood pressure increase with IV fluids here.  D5 was given for starvation ketosis.  Negative lactate, normal white blood cell count, afebrile, patient is not septic.  She does have heavy UTI and Rocephin was ordered.  She will be admitted for rehydration, nutrition, PT/OT.    1. Hypomagnesemia    2. GILSON (acute kidney injury) (H24)    3. Falls frequently        Labs and Imaging:  Results for orders placed or performed during the hospital encounter of 04/29/24   Head CT w/o contrast    Impression    IMPRESSION:  1.  No CT evidence for acute intracranial process.  2.  Brain atrophy and presumed chronic microvascular ischemic changes as above.   CT Pelvis Bone wo Contrast    Impression    IMPRESSION:  1.  No acute fracture.  2.  Avascular necrosis of the femoral heads bilaterally without articular surface collapse.  3.  Mild degenerative arthrosis both " hips.   Extra Blue Top Tube   Result Value Ref Range    Hold Specimen JIC    Extra Green Top (Lithium Heparin) Tube   Result Value Ref Range    Hold Specimen JIC    Extra Purple Top Tube   Result Value Ref Range    Hold Specimen JIC    Basic metabolic panel   Result Value Ref Range    Sodium 128 (L) 135 - 145 mmol/L    Potassium 4.0 3.4 - 5.3 mmol/L    Chloride 89 (L) 98 - 107 mmol/L    Carbon Dioxide (CO2) 17 (L) 22 - 29 mmol/L    Anion Gap 22 (H) 7 - 15 mmol/L    Urea Nitrogen 34.4 (H) 8.0 - 23.0 mg/dL    Creatinine 2.38 (H) 0.51 - 0.95 mg/dL    GFR Estimate 21 (L) >60 mL/min/1.73m2    Calcium 10.2 8.8 - 10.2 mg/dL    Glucose 145 (H) 70 - 99 mg/dL   Troponin T, High Sensitivity (now)   Result Value Ref Range    Troponin T, High Sensitivity 56 (H) <=14 ng/L   Result Value Ref Range    Magnesium 1.0 (L) 1.7 - 2.3 mg/dL   CBC with platelets and differential   Result Value Ref Range    WBC Count 6.9 4.0 - 11.0 10e3/uL    RBC Count 3.89 3.80 - 5.20 10e6/uL    Hemoglobin 12.8 11.7 - 15.7 g/dL    Hematocrit 36.0 35.0 - 47.0 %    MCV 93 78 - 100 fL    MCH 32.9 26.5 - 33.0 pg    MCHC 35.6 31.5 - 36.5 g/dL    RDW 13.5 10.0 - 15.0 %    Platelet Count 182 150 - 450 10e3/uL    % Neutrophils 61 %    % Lymphocytes 31 %    % Monocytes 6 %    % Eosinophils 1 %    % Basophils 1 %    % Immature Granulocytes 0 %    NRBCs per 100 WBC 0 <1 /100    Absolute Neutrophils 4.2 1.6 - 8.3 10e3/uL    Absolute Lymphocytes 2.2 0.8 - 5.3 10e3/uL    Absolute Monocytes 0.4 0.0 - 1.3 10e3/uL    Absolute Eosinophils 0.1 0.0 - 0.7 10e3/uL    Absolute Basophils 0.1 0.0 - 0.2 10e3/uL    Absolute Immature Granulocytes 0.0 <=0.4 10e3/uL    Absolute NRBCs 0.0 10e3/uL   Lactic acid whole blood   Result Value Ref Range    Lactic Acid 0.9 0.7 - 2.0 mmol/L   Ketone Beta-Hydroxybutyrate Quantitative   Result Value Ref Range    Ketone (Beta-Hydroxybutyrate) Quantitative 2.70 (HH) <=0.30 mmol/L       I have reviewed the relevant laboratory studies above.    I  independently interpreted the following imaging study(s):       EKG: I reviewed and independently interpreted the patient's EKG, with comments made as listed below. Please see scanned EKG for full report.       Procedures:  I was present for the key portions of procedures documented in QUIN/midlevel note, see midlevel note for further details.    Grady Vyas MD  [facility] EMERGENCY DEPARTMENT  [address] [place] [id number]  [phone]     Grady Vyas MD  04/29/24 7032

## 2024-04-29 NOTE — ED NOTES
Bed: JNED-28  Expected date:   Expected time:   Means of arrival:   Comments:  69 female weakness after fall

## 2024-04-29 NOTE — H&P
[facility]    History and Physical - Hospitalist Service       Date of Admission:  4/29/2024    Assessment & Plan      Gabi Sandhu is a 69 year old female admitted on 4/29/2024. She has a h/o HLD, DM presented with recurrent falls and generalized weakness for several weeks. Today, pt felt lightheaded, legs gave out, and fell on her back. Denied hitting head, loss of consciousness, chest pain, shortness of breath. Pt last ate 3 days ago stating she doesn't feel hungry. Per ED, pt BP was 80/50. In ED, BP was borderline, received NS bolus. Labs showed hyponatremia, elevated creatinine, hypomagnesemia, RBS: 145, ketone 2.7. Pt will be admitted for fall, GILSON.    S/p fall, recurrent  Generalized weakness  -- fall precaution  -- PT/OT  -- Ck: 79  -- CT-head: negative for acute intracranial process    GILSON  -- likely 2/2 dehydration  -- FENA, US-renal  -- Normal saline    AG metabolic acidosis  Ketonemia  -- likely 2/2 dehydration  -- ketosis likely 2/2 starvation  given RBS: 145 and pt not on SGL2I  -- ABG. Ph: 7.36.    Elevated d-dimer  -- No shortness of breath.  Saturating well on RA  -- Left leg seems larger than left. No tenderness.  -- US-duplex- LE  -- defer CTA-chest due to no symptoms and GILSON for now    Possible UTI  -- U/A: LE+, bacteriuria  -- ceftriaxone  -- Ucx, Bcx pending    Hyponatremia  hypochloremia  -- likely 2/2 dehydration  -- Makayla, Uosm  -- IV with NS    Hypomagnesemia  -- s/p mag sulfate 2 gm in ED  -- monitor Mg level in am labs    Elevated troponin  -- troponin flat. No chest pain. EKG: non specific ST waves in lateral leads  -- likely 2/2 demand ischemia (type II MI)    DM-II  -- Not on meds  -- RBS: 145  -- A1c in 10/26/2023: 6.9. will repeat A1c  -- Accu-checks, moderate sliding scale    Essential hypertension  -- hold lisinopril due to GILSON  -- Monitor vital signs per protocol  -- hydralazin iv prn    HLD  -- C/w PTA lipitor.       B/l osteonecrosis of femoral heads  "without articular surface collapse  -- ED consulted orthopedics: no intervention    Glaucoma  -- c/w PTA timoptic    Decreased po intake  -- RD consult              Diet: Combination Diet Moderate Consistent Carb (60 g CHO per Meal) Diet  DVT Prophylaxis: Pneumatic Compression Devices  [place] Catheter: Not present  Lines: None     Cardiac Monitoring: None  Code Status: No CPR- Pre-arrest intubation OK    Clinically Significant Risk Factors Present on Admission         # Hyponatremia: Lowest Na = 128 mmol/L in last 2 days, will monitor as appropriate   # Hypercalcemia: Highest Ca = 10.2 mg/dL in last 2 days, will monitor as appropriate  # Hypomagnesemia: Lowest Mg = 1 mg/dL in last 2 days, will replace as needed  # Anion Gap Metabolic Acidosis: Highest Anion Gap = 22 mmol/L in last 2 days, will monitor and treat as appropriate    # Drug Induced Platelet Defect: home medication list includes an antiplatelet medication   # Hypertension: Noted on problem list      # Obesity: Estimated body mass index is 31.07 kg/m  as calculated from the following:    Height as of this encounter: 1.626 m (5' 4\").    Weight as of this encounter: 82.1 kg (181 lb).              Disposition Plan     Medically Ready for Discharge: Anticipated in 2-4 Days           Ramandeep Issa MD  Hospitalist Service  [facility]  Securely message with [employer] (more info)  Text page via [employer] Paging/Directory     ______________________________________________________________________    Chief Complaint   S/p fall, generalized weakness, decreased po intake    History is obtained from the patient    History of Present Illness   Gabi Sandhu is a 69 year old female who is a 69 year old female admitted on 4/29/2024. She has a h/o HLD, DM presented with recurrent falls and generalized weakness for several weeks. Today, pt felt lightheaded, legs gave out, and fell on her back. Denied hitting head, loss of consciousness, chest pain, " shortness of breath. Pt last ate 3 days ago stating she doesn't feel hungry. Per ED, pt BP was 80/50. In ED, BP was borderline, received NS bolus. Labs showed hyponatremia, elevated creatinine, hypomagnesemia, RBS: 145, ketone 2.7. Pt will be admitted for fall, GILSON.      Past Medical History    No past medical history on file.    Past Surgical History   Past Surgical History:   Procedure Laterality Date    CHOLECYSTECTOMY      HYSTERECTOMY  1998    for dysfunctional uterine bleeding, no cancer    OOPHORECTOMY Bilateral 1998    OVARIAN CYST SURGERY      TONSILLECTOMY         Prior to Admission Medications   Prior to Admission Medications   Prescriptions Last Dose Informant Patient Reported? Taking?   Omega-3 Fatty Acids (FISH OIL) 1200 MG capsule Past Week at pt last dose 3 days ago  Yes Yes   Sig: Take 1,200 mg by mouth daily   aspirin 81 MG EC tablet 4/29/2024 at am  Yes Yes   Sig: [ASPIRIN 81 MG EC TABLET] Take 81 mg by mouth once daily.   atorvastatin (LIPITOR) 40 MG tablet 4/28/2024 at pm  No Yes   Sig: Take 1 tablet (40 mg) by mouth daily   blood sugar diagnostic (ONETOUCH VERIO) Strp   Yes No   Sig: [BLOOD SUGAR DIAGNOSTIC (ONETOUCH VERIO) STRP] Apply 1 each topically 2 (two) times a day.   cholecalciferol, vitamin D3, 2,000 unit Tab 4/29/2024 at am  Yes Yes   Sig: [CHOLECALCIFEROL, VITAMIN D3, 2,000 UNIT TAB] Take 1 tablet by mouth once daily.   lisinopril (ZESTRIL) 20 MG tablet 4/29/2024 at am  No Yes   Sig: [LISINOPRIL (PRINIVIL,ZESTRIL) 20 MG TABLET] TAKE 1 TABLET BY MOUTH EVERY DAY   omeprazole (PRILOSEC) 20 MG DR capsule 4/29/2024 at am  No Yes   Sig: TAKE 1 CAPSULE (20 MG TOTAL) BY MOUTH DAILY BEFORE BREAKFAST.   timolol maleate (TIMOPTIC) 0.5 % ophthalmic solution Past Week at pt last dose 3 days ago  Yes Yes   Sig: [TIMOLOL MALEATE (TIMOPTIC) 0.5 % OPHTHALMIC SOLUTION] APPLY 1 DROP IN BOTH EYES ONCE IN THE MORNING      Facility-Administered Medications: None        Review of Systems    The 10 point  Review of Systems is negative other than noted in the HPI or here.      Physical Exam   Vital Signs: Temp: 97.4  F (36.3  C) Temp src: Oral BP: 136/63 Pulse: 91   Resp: 21 SpO2: 100 % O2 Device: None (Room air)    Weight: 181 lbs 0 oz    GEN: Alert and oriented. Not in acute distress.  HEENT: Atraumatic, mucous membrane- moist and pink.  Chest: Bilateral air entry.  CVS: S1S2 regular.   Abdomen: Soft. Non-tender, non-distended. No organomegaly. No guarding or rigidity. Bowel sounds active.   Extremities: No pedal edema.  CNS: No involuntary movements.  Skin: no cyanosis or clubbing.     Medical Decision Making       78 MINUTES SPENT BY ME on the date of service doing chart review, history, exam, documentation & further activities per the note.      Data

## 2024-04-29 NOTE — MEDICATION SCRIBE - ADMISSION MEDICATION HISTORY
Medication Scribe Admission Medication History    Admission medication history is complete. The information provided in this note is only as accurate as the sources available at the time of the update.    Information Source(s): Patient and CareEverywhere/SureScripts via in-person    Pertinent Information: pt states not taking metformin    Changes made to PTA medication list:  Added: None  Deleted: metformin  Changed: None    Allergies reviewed with patient and updates made in EHR: yes    Medication History Completed By: NANCY MENA 4/29/2024 4:03 PM    PTA Med List   Medication Sig Last Dose    aspirin 81 MG EC tablet [ASPIRIN 81 MG EC TABLET] Take 81 mg by mouth once daily. 4/29/2024 at am    atorvastatin (LIPITOR) 40 MG tablet Take 1 tablet (40 mg) by mouth daily 4/28/2024 at pm    cholecalciferol, vitamin D3, 2,000 unit Tab [CHOLECALCIFEROL, VITAMIN D3, 2,000 UNIT TAB] Take 1 tablet by mouth once daily. 4/29/2024 at am    lisinopril (ZESTRIL) 20 MG tablet [LISINOPRIL (PRINIVIL,ZESTRIL) 20 MG TABLET] TAKE 1 TABLET BY MOUTH EVERY DAY 4/29/2024 at am    Omega-3 Fatty Acids (FISH OIL) 1200 MG capsule Take 1,200 mg by mouth daily Past Week at pt last dose 3 days ago    omeprazole (PRILOSEC) 20 MG DR capsule TAKE 1 CAPSULE (20 MG TOTAL) BY MOUTH DAILY BEFORE BREAKFAST. 4/29/2024 at am    timolol maleate (TIMOPTIC) 0.5 % ophthalmic solution [TIMOLOL MALEATE (TIMOPTIC) 0.5 % OPHTHALMIC SOLUTION] APPLY 1 DROP IN BOTH EYES ONCE IN THE MORNING Past Week at pt last dose 3 days ago

## 2024-04-30 ENCOUNTER — APPOINTMENT (OUTPATIENT)
Dept: ULTRASOUND IMAGING | Facility: HOSPITAL | Age: 69
DRG: 682 | End: 2024-04-30
Attending: STUDENT IN AN ORGANIZED HEALTH CARE EDUCATION/TRAINING PROGRAM
Payer: COMMERCIAL

## 2024-04-30 ENCOUNTER — APPOINTMENT (OUTPATIENT)
Dept: PHYSICAL THERAPY | Facility: HOSPITAL | Age: 69
DRG: 682 | End: 2024-04-30
Attending: STUDENT IN AN ORGANIZED HEALTH CARE EDUCATION/TRAINING PROGRAM
Payer: COMMERCIAL

## 2024-04-30 ENCOUNTER — APPOINTMENT (OUTPATIENT)
Dept: OCCUPATIONAL THERAPY | Facility: HOSPITAL | Age: 69
DRG: 682 | End: 2024-04-30
Attending: STUDENT IN AN ORGANIZED HEALTH CARE EDUCATION/TRAINING PROGRAM
Payer: COMMERCIAL

## 2024-04-30 LAB
ANION GAP SERPL CALCULATED.3IONS-SCNC: 19 MMOL/L (ref 7–15)
BACTERIA UR CULT: ABNORMAL
BASOPHILS # BLD AUTO: 0.1 10E3/UL (ref 0–0.2)
BASOPHILS NFR BLD AUTO: 1 %
BUN SERPL-MCNC: 32.5 MG/DL (ref 8–23)
CALCIUM SERPL-MCNC: 9.5 MG/DL (ref 8.8–10.2)
CHLORIDE SERPL-SCNC: 98 MMOL/L (ref 98–107)
CREAT SERPL-MCNC: 1.86 MG/DL (ref 0.51–0.95)
DEPRECATED HCO3 PLAS-SCNC: 15 MMOL/L (ref 22–29)
EGFRCR SERPLBLD CKD-EPI 2021: 29 ML/MIN/1.73M2
ENTEROCOCCUS FAECALIS: NOT DETECTED
ENTEROCOCCUS FAECIUM: NOT DETECTED
EOSINOPHIL # BLD AUTO: 0.1 10E3/UL (ref 0–0.7)
EOSINOPHIL NFR BLD AUTO: 1 %
ERYTHROCYTE [DISTWIDTH] IN BLOOD BY AUTOMATED COUNT: 13.5 % (ref 10–15)
GLUCOSE BLDC GLUCOMTR-MCNC: 113 MG/DL (ref 70–99)
GLUCOSE BLDC GLUCOMTR-MCNC: 137 MG/DL (ref 70–99)
GLUCOSE BLDC GLUCOMTR-MCNC: 137 MG/DL (ref 70–99)
GLUCOSE BLDC GLUCOMTR-MCNC: 95 MG/DL (ref 70–99)
GLUCOSE SERPL-MCNC: 100 MG/DL (ref 70–99)
HCT VFR BLD AUTO: 33.7 % (ref 35–47)
HGB BLD-MCNC: 11.8 G/DL (ref 11.7–15.7)
IMM GRANULOCYTES # BLD: 0 10E3/UL
IMM GRANULOCYTES NFR BLD: 0 %
LISTERIA SPECIES (DETECTED/NOT DETECTED): NOT DETECTED
LYMPHOCYTES # BLD AUTO: 2 10E3/UL (ref 0.8–5.3)
LYMPHOCYTES NFR BLD AUTO: 28 %
MAGNESIUM SERPL-MCNC: 1.7 MG/DL (ref 1.7–2.3)
MCH RBC QN AUTO: 32.6 PG (ref 26.5–33)
MCHC RBC AUTO-ENTMCNC: 35 G/DL (ref 31.5–36.5)
MCV RBC AUTO: 93 FL (ref 78–100)
MONOCYTES # BLD AUTO: 0.4 10E3/UL (ref 0–1.3)
MONOCYTES NFR BLD AUTO: 5 %
MRSA DNA SPEC QL NAA+PROBE: NEGATIVE
NEUTROPHILS # BLD AUTO: 4.6 10E3/UL (ref 1.6–8.3)
NEUTROPHILS NFR BLD AUTO: 65 %
NRBC # BLD AUTO: 0 10E3/UL
NRBC BLD AUTO-RTO: 0 /100
PLATELET # BLD AUTO: 169 10E3/UL (ref 150–450)
POTASSIUM SERPL-SCNC: 4.1 MMOL/L (ref 3.4–5.3)
PROCALCITONIN SERPL IA-MCNC: 0.07 NG/ML
RBC # BLD AUTO: 3.62 10E6/UL (ref 3.8–5.2)
SA TARGET DNA: NEGATIVE
SODIUM SERPL-SCNC: 132 MMOL/L (ref 135–145)
STAPHYLOCOCCUS AUREUS: NOT DETECTED
STAPHYLOCOCCUS EPIDERMIDIS: NOT DETECTED
STAPHYLOCOCCUS LUGDUNENSIS: NOT DETECTED
STAPHYLOCOCCUS SPECIES: DETECTED
STREPTOCOCCUS AGALACTIAE: NOT DETECTED
STREPTOCOCCUS ANGINOSUS GROUP: NOT DETECTED
STREPTOCOCCUS PNEUMONIAE: NOT DETECTED
STREPTOCOCCUS PYOGENES: NOT DETECTED
STREPTOCOCCUS SPECIES: NOT DETECTED
WBC # BLD AUTO: 7.2 10E3/UL (ref 4–11)

## 2024-04-30 PROCEDURE — 97535 SELF CARE MNGMENT TRAINING: CPT | Mod: GO

## 2024-04-30 PROCEDURE — 84145 PROCALCITONIN (PCT): CPT | Performed by: INTERNAL MEDICINE

## 2024-04-30 PROCEDURE — 97116 GAIT TRAINING THERAPY: CPT | Mod: GP

## 2024-04-30 PROCEDURE — 97165 OT EVAL LOW COMPLEX 30 MIN: CPT | Mod: GO

## 2024-04-30 PROCEDURE — 250N000013 HC RX MED GY IP 250 OP 250 PS 637: Performed by: STUDENT IN AN ORGANIZED HEALTH CARE EDUCATION/TRAINING PROGRAM

## 2024-04-30 PROCEDURE — 82962 GLUCOSE BLOOD TEST: CPT

## 2024-04-30 PROCEDURE — 258N000003 HC RX IP 258 OP 636: Performed by: STUDENT IN AN ORGANIZED HEALTH CARE EDUCATION/TRAINING PROGRAM

## 2024-04-30 PROCEDURE — 97161 PT EVAL LOW COMPLEX 20 MIN: CPT | Mod: GP

## 2024-04-30 PROCEDURE — 85025 COMPLETE CBC W/AUTO DIFF WBC: CPT | Performed by: STUDENT IN AN ORGANIZED HEALTH CARE EDUCATION/TRAINING PROGRAM

## 2024-04-30 PROCEDURE — 250N000013 HC RX MED GY IP 250 OP 250 PS 637: Performed by: INTERNAL MEDICINE

## 2024-04-30 PROCEDURE — 250N000009 HC RX 250: Performed by: STUDENT IN AN ORGANIZED HEALTH CARE EDUCATION/TRAINING PROGRAM

## 2024-04-30 PROCEDURE — 97530 THERAPEUTIC ACTIVITIES: CPT | Mod: GO

## 2024-04-30 PROCEDURE — 36415 COLL VENOUS BLD VENIPUNCTURE: CPT | Performed by: STUDENT IN AN ORGANIZED HEALTH CARE EDUCATION/TRAINING PROGRAM

## 2024-04-30 PROCEDURE — 76770 US EXAM ABDO BACK WALL COMP: CPT

## 2024-04-30 PROCEDURE — 120N000001 HC R&B MED SURG/OB

## 2024-04-30 PROCEDURE — 83735 ASSAY OF MAGNESIUM: CPT | Performed by: STUDENT IN AN ORGANIZED HEALTH CARE EDUCATION/TRAINING PROGRAM

## 2024-04-30 PROCEDURE — 87640 STAPH A DNA AMP PROBE: CPT | Performed by: INTERNAL MEDICINE

## 2024-04-30 PROCEDURE — 250N000011 HC RX IP 250 OP 636: Performed by: STUDENT IN AN ORGANIZED HEALTH CARE EDUCATION/TRAINING PROGRAM

## 2024-04-30 PROCEDURE — 80048 BASIC METABOLIC PNL TOTAL CA: CPT | Performed by: STUDENT IN AN ORGANIZED HEALTH CARE EDUCATION/TRAINING PROGRAM

## 2024-04-30 PROCEDURE — 97530 THERAPEUTIC ACTIVITIES: CPT | Mod: GP

## 2024-04-30 PROCEDURE — 99232 SBSQ HOSP IP/OBS MODERATE 35: CPT | Performed by: INTERNAL MEDICINE

## 2024-04-30 PROCEDURE — 93970 EXTREMITY STUDY: CPT

## 2024-04-30 RX ORDER — MULTIVITAMIN,THERAPEUTIC
1 TABLET ORAL DAILY
Status: DISCONTINUED | OUTPATIENT
Start: 2024-04-30 | End: 2024-05-03 | Stop reason: HOSPADM

## 2024-04-30 RX ADMIN — THERA TABS 1 TABLET: TAB at 14:40

## 2024-04-30 RX ADMIN — ASPIRIN 81 MG: 81 TABLET, COATED ORAL at 07:55

## 2024-04-30 RX ADMIN — ATORVASTATIN CALCIUM 40 MG: 40 TABLET, FILM COATED ORAL at 19:55

## 2024-04-30 RX ADMIN — SODIUM CHLORIDE: 9 INJECTION, SOLUTION INTRAVENOUS at 17:35

## 2024-04-30 RX ADMIN — SODIUM CHLORIDE: 9 INJECTION, SOLUTION INTRAVENOUS at 05:10

## 2024-04-30 RX ADMIN — CEFTRIAXONE SODIUM 1 G: 1 INJECTION, POWDER, FOR SOLUTION INTRAMUSCULAR; INTRAVENOUS at 07:55

## 2024-04-30 RX ADMIN — TIMOLOL MALEATE 1 DROP: 5 SOLUTION/ DROPS OPHTHALMIC at 07:56

## 2024-04-30 RX ADMIN — PANTOPRAZOLE SODIUM 40 MG: 40 TABLET, DELAYED RELEASE ORAL at 06:12

## 2024-04-30 ASSESSMENT — ACTIVITIES OF DAILY LIVING (ADL)
ADLS_ACUITY_SCORE: 40
ADLS_ACUITY_SCORE: 40
DRESSING/BATHING_DIFFICULTY: YES
ADLS_ACUITY_SCORE: 43
ADLS_ACUITY_SCORE: 43
ADLS_ACUITY_SCORE: 40
ADLS_ACUITY_SCORE: 43
ADLS_ACUITY_SCORE: 40
ADLS_ACUITY_SCORE: 43
WALKING_OR_CLIMBING_STAIRS: OTHER (SEE COMMENTS)
DOING_ERRANDS_INDEPENDENTLY_DIFFICULTY: YES
NUMBER_OF_TIMES_PATIENT_HAS_FALLEN_WITHIN_LAST_SIX_MONTHS: 3
DEPENDENT_IADLS:: INDEPENDENT
DRESSING/BATHING: DRESSING DIFFICULTY, ASSISTANCE 1 PERSON;BATHING DIFFICULTY, ASSISTANCE 1 PERSON
ADLS_ACUITY_SCORE: 43
ADLS_ACUITY_SCORE: 40
ADLS_ACUITY_SCORE: 40
CHANGE_IN_FUNCTIONAL_STATUS_SINCE_ONSET_OF_CURRENT_ILLNESS/INJURY: YES
ADLS_ACUITY_SCORE: 34
ADLS_ACUITY_SCORE: 43
ADLS_ACUITY_SCORE: 37
ADLS_ACUITY_SCORE: 43
FALL_HISTORY_WITHIN_LAST_SIX_MONTHS: YES
ADLS_ACUITY_SCORE: 43
ADLS_ACUITY_SCORE: 43
TOILETING_ISSUES: NO
ADLS_ACUITY_SCORE: 43
ADLS_ACUITY_SCORE: 43

## 2024-04-30 NOTE — PROGRESS NOTES
04/30/24 0840   Appointment Info   Signing Clinician's Name / Credentials (OT) Madisyn Alejandre, OTR/L   Living Environment   People in Home alone   Current Living Arrangements house  ([facility])   Self-Care   Usual Activity Tolerance moderate   Current Activity Tolerance fair   Equipment Currently Used at Home none  (just got shower chair that is too short and needs to be assembled, hasn't used FWW yet)   Fall history within last six months yes   Activity/Exercise/Self-Care Comment ind for BADLs but has been doing sponge baths d/t fear of falling in shower   Instrumental Activities of Daily Living (IADL)   IADL Comments hasn't been eating or cleaning much, doesn't drive, IND for meds   General Information   Onset of Illness/Injury or Date of Surgery 04/29/24   Referring Physician Ramandeep Issa MD   Patient/Family Therapy Goal Statement (OT) go home   Additional Occupational Profile Info/Pertinent History of Current Problem presented with recurrent falls and generalized weakness for several weeks. PMH fall, GILSON, HLD, DM   Existing Precautions/Restrictions fall   Cognitive Status Examination   Orientation Status orientation to person, place and time   Affect/Mental Status (Cognitive) anxious   Follows Commands follows one-step commands   Cognitive Status Comments 1/3 short-term recall, other 2 words with category cues   Range of Motion Comprehensive   General Range of Motion bilateral upper extremity ROM WFL   Strength Comprehensive (MMT)   Comment, General Manual Muscle Testing (MMT) Assessment weakness BUE but WFL for ADLs   Bed Mobility   Bed Mobility supine-sit;sit-supine   Supine-Sit [place] (Bed Mobility) supervision;verbal cues   Sit-Supine [place] (Bed Mobility) supervision;verbal cues   Assistive Device (Bed Mobility) bed rails   Transfers   Transfers sit-stand transfer;toilet transfer   Sit-Stand Transfer   Sit-Stand [place] (Transfers) contact guard   Toilet Transfer   Type (Toilet  Transfer) sit-stand;stand-sit   [place] Level (Toilet Transfer) contact guard   Balance   Balance Comments CGA with FWW for static standing   Activities of Daily Living   BADL Assessment/Intervention lower body dressing;toileting   Lower Body Dressing Assessment/Training   [place] Level (Lower Body Dressing) contact guard assist   Toileting   [place] Level (Toileting) contact guard assist   Clinical Impression   Criteria for Skilled Therapeutic Interventions Met (OT) Yes, treatment indicated   OT Diagnosis dec BADL indep   OT Problem List-Impairments impacting ADL problems related to;activity tolerance impaired;balance;cognition;mobility;strength   Assessment of Occupational Performance 5 or more Performance Deficits   Identified Performance Deficits dressing, toileting, bathing, household mobility, functional transfers, meal preparation, household management   Planned Therapy Interventions (OT) ADL retraining;IADL retraining;bed mobility training;cognition;strengthening;transfer training;progressive activity/exercise;home program guidelines   Clinical Decision Making Complexity (OT) problem focused assessment/low complexity   Risk & Benefits of therapy have been explained evaluation/treatment results reviewed;participants included;patient   OT Total Evaluation Time   OT Eval, Low Complexity Minutes (74310) 10   OT Goals   Therapy Frequency (OT) Daily   OT Predicted Duration/Target Date for Goal Attainment 05/07/24   OT Goals Lower Body Dressing;Toilet Transfer/Toileting;Transfers;Cognition   OT: Lower Body Dressing Modified independent;including set-up/clothing retrieval   OT: Transfer Modified independent  (tub transfer)   OT: Toilet Transfer/Toileting Modified independent;toilet transfer;cleaning and garment management   OT: Cognitive Patient/caregiver will verbalize understanding of cognitive assessment results/recommendations as needed for safe discharge planning   Self-Care/Home Management    Self-Care/Home Mgmt/ADL, Compensatory, Meal Prep Minutes (65909) 10   Symptoms Noted During/After Treatment (Meal Preparation/Planning Training) dizziness   Treatment Detail/Skilled Intervention Requesting to use commode at end of session - declines walking to toilet dispite encouragement. SBA for pivot to/from commode without FWW and SBA for clothing management.   Therapeutic Activities   Therapeutic Activity Minutes (97239) 10   Symptoms noted during/after treatment dizziness   Treatment Detail/Skilled Intervention Cues for hand placement for transfers with FWW - requires 1 additional cue following but then compliant with educ. Progresses to SBA when hands in safe position during transfers. SBA with FWW for mobility in room 20ft to progress activity tolerance and educ on FWW use - requires encouragement to continue. Reporting dizziness but BP WNL, then pt reporting fear of falling being main limiting factor in increasing distance.   OT Discharge Planning   OT Plan activity tolerance during ADL routines, SLUMs, FWW training   OT Discharge Recommendation (DC Rec) home with assist;home with home care occupational therapy   OT Rationale for DC Rec completing BADLs with SBA. recommend home OT to assess shower setup and assist with shower chair setup as pt reporting she purchased one but couldn't put it together. Recommend PRN assist from friends for IADLs - pt hesistant to ask for assistance.   OT Brief overview of current status SBA   Total Session Time   Timed Code Treatment Minutes 20   Total Session Time (sum of timed and untimed services) 30

## 2024-04-30 NOTE — PLAN OF CARE
NURSING PROGRESS NOTE  Shift Summary      Date: April 30, 2024   [id number]  Neuro/Musculoskeletal:  A&Ox4. Calm and pleasant towards writer and other staff. Receptive to all cares. Neurologically intact.   Cardiac:  On TELE monitoring: NSR w/ occasional PVCs. Denied and offered no c/o CP. VSS  Respiratory:  LS: CTA, absent of any adventitious sounds. Continued on RA, SpO2: 99%. Denied SOB. Absent of cough.   GI/:  BS: normoactive x4Q. PT states that she has not had a BM for several days. Refusing any bowel meds at this time. Urine is cloudy lópez. Voiding spontaneously without difficulty.   Diet/Appetite: Tolerating a 60g carb diet. Denied nausea.   Activity:  Pt able to make position changes on own in bed. Refusing to get up to bedside commode at beginning of shift because she was scared of falling. Pt receptive to bedside commode at 0600 after education and reassurance. A1 w/FWW and gaitbelt, steady gait noted.     Pain:  Denied and offered no c/o pain, absent of non-verbal indicators.   Skin:  No new deficits noted.   LDAs + Drips/IVF:  PIVx1 to L AC and x1 to R AC. Continued on NS@100ml/hr. Dressings CDI.    Protocols/Labs:  Continued on Mag protocol, replaced in ED, recheck in the AM.       Goal Outcome Evaluation:    Problem: Skin Injury Risk Increased  Goal: Skin Health and Integrity  Outcome: Progressing  Intervention: Plan: Nurse Driven Intervention: Friction and Shear  Recent Flowsheet Documentation  Taken 4/30/2024 0500 by Jones Almeida, RN  Friction/Shear Interventions: HOB 30 degrees or less  Taken 4/30/2024 0000 by Jones Almeida, RN  Friction/Shear Interventions: HOB 30 degrees or less  Taken 4/29/2024 2026 by Jones Almeida, RN  Friction/Shear Interventions: HOB 30 degrees or less  Intervention: Optimize Skin Protection  Recent Flowsheet Documentation  Taken 4/30/2024 0600 by Jones Almeida, RN  Activity Management: activity adjusted per tolerance  Taken 4/30/2024 0500 by Jones Almeida,  "RN  Activity Management: (Pt stated that she does not want to stand to use bedside commode, \"I am scared to fall.\" Pt only willing to get up with PT.)   activity adjusted per tolerance   patient refuses activity  Head of Bed (HOB) Positioning: HOB at 20-30 degrees  Taken 4/30/2024 0026 by Jones Almeida RN  Activity Management: activity adjusted per tolerance  Head of Bed (HOB) Positioning: HOB at 20-30 degrees  Taken 4/30/2024 0000 by Jones Almeida RN  Activity Management: activity adjusted per tolerance  Head of Bed (HOB) Positioning: HOB at 20-30 degrees  Taken 4/29/2024 2026 by Jones Almeida, RN  Activity Management: activity adjusted per tolerance  Head of Bed (HOB) Positioning: HOB at 20-30 degrees     Problem: Comorbidity Management  Goal: Blood Glucose Levels Within Targeted Range  Outcome: Progressing                         "

## 2024-04-30 NOTE — PROGRESS NOTES
"   04/30/24 0930   Appointment Info   Signing Clinician's Name / Credentials (PT) Jazmyn Davis, PT, DPT   Living Environment   People in Home alone   Current Living Arrangements house  ([facility])   Home Accessibility stairs to enter home   Number of Stairs, Main Entrance 3   Stair Railings, Main Entrance   (Right side railing for 2 stairs then no railing for one step)   Self-Care   Equipment Currently Used at Home other (see comments)  (no equipment currently but recently purchased a FWW)   Fall history within last six months yes   Number of times patient has fallen within last six months 3   Activity/Exercise/Self-Care Comment pt reported being independent with functional mobility at baseline   General Information   Onset of Illness/Injury or Date of Surgery 04/30/24   Referring Physician Ramandeep Issa MD   Patient/Family Therapy Goals Statement (PT) Return to Home   Pertinent History of Current Problem (include personal factors and/or comorbidities that impact the POC) Per Chart Review -\"69 year old female admitted on 4/29/2024. She has a h/o HLD, DM presented with recurrent falls and generalized weakness for several weeks. Today, pt felt lightheaded, legs gave out, and fell on her back. Denied hitting head, loss of consciousness, chest pain, shortness of breath. Pt last ate 3 days ago stating she doesn't feel hungry. Per ED, pt BP was 80/50. In ED, BP was borderline, received NS bolus. Labs showed hyponatremia, elevated creatinine, hypomagnesemia, RBS: 145, ketone 2.7. Pt will be admitted for fall, GILSON.\"   Existing Precautions/Restrictions fall   Range of Motion (ROM)   Range of Motion ROM is WFL   Strength (Manual Muscle Testing)   Strength (Manual Muscle Testing) Deficits observed during functional mobility   Bed Mobility   Bed Mobility supine-sit   Supine-Sit [place] (Bed Mobility) supervision;verbal cues;contact guard   Impairments Contributing to Impaired Bed Mobility decreased strength "   Transfers   Transfers sit-stand transfer   Sit-Stand Transfer   Sit-Stand [place] (Transfers) supervision;verbal cues;contact guard   Assistive Device (Sit-Stand Transfers) walker, front-wheeled   Gait/Stairs (Locomotion)   [place] Level (Gait) supervision;verbal cues;contact guard   Assistive Device (Gait) walker, front-wheeled   Distance in Feet (Gait) 1   Pattern (Gait) step-through;swing-through   Deviations/Abnormal Patterns (Gait) gait speed decreased;bella decreased   Clinical Impression   Criteria for Skilled Therapeutic Intervention Yes, treatment indicated   PT Diagnosis (PT) Impaired Functional Mobility   Influenced by the following impairments weakness, impaired balance   Functional limitations due to impairments gait, transfers, stairs   Clinical Presentation (PT Evaluation Complexity) stable   Clinical Presentation Rationale pt presents as medically diagnosed   Clinical Decision Making (Complexity) low complexity   Planned Therapy Interventions (PT) balance training;gait training;home exercise program;stair training;strengthening;transfer training;neuromuscular re-education   Risk & Benefits of therapy have been explained evaluation/treatment results reviewed;patient   PT Total Evaluation Time   PT Eval, Low Complexity Minutes (32686) 10   Physical Therapy Goals   PT Frequency Daily   PT Predicted Duration/Target Date for Goal Attainment 05/06/24   PT Goals Bed Mobility;Transfers;Gait;Stairs   PT: Bed Mobility Supervision/stand-by assist;Supine to/from sit;Rolling;Bridging   PT: Transfers Supervision/stand-by assist;Sit to/from stand;Assistive device   PT: Gait Supervision/stand-by assist;Rolling walker;Within precautions;100 feet   PT: Stairs 2 stairs;Rail on both sides;Minimal assist   Interventions   Interventions Quick Adds Gait Training;Therapeutic Activity   Therapeutic Activity   Therapeutic Activities: dynamic activities to improve functional performance Minutes (70170) 9    Symptoms Noted During/After Treatment Fatigue  (lightheadness - /62)   Treatment Detail/Skilled Intervention Sitting balance EOB: cueing for safety, SBA; sit to supine: cueing for safety/technique, Min A; Scooting up in bed: cueing for safety/technique, Mod A to initiate then CGA; pt reported increased feelings of lightheadedness BP checked and was 125/62; Increased time for management of lines & tubes, monitoring vitals; bed alarm on and call light within reach at end of session   Gait Training   Gait Training Minutes (59092) 8   Symptoms Noted During/After Treatment (Gait Training) fatigue   Treatment Detail/Skilled Intervention pt ambulated in hallway with FWW. cueing for safety/technique/FWW management/posture. ambulation shortened d/t pt reporting increased fear/anxiety about head symptoms & falling. Seated rest break then pt declining further gait. Education and demo for FWW management/safety. Mild instability noted, recommend use of FWW   Distance in Feet 40   [place] Level (Gait Training) contact guard   Physical Assistance Level (Gait Training) supervision;verbal cues   Weight Bearing (Gait Training) full weight-bearing   Assistive Device (Gait Training) rolling walker   Pattern Analysis (Gait Training) swing-through gait   Gait Analysis Deviations decreased bella;decreased step length   Impairments (Gait Analysis/Training) balance impaired;strength decreased   PT Discharge Planning   PT Plan gait as maggie, LE [place], transfers, bed mobility   PT Discharge Recommendation (DC Rec) Transitional Care Facility   PT Rationale for DC Rec pt currently requires increased assist with functional mobility. Increased fear of falling reported & noted instability with ambulation leading to an increased risk for falls. TCU for improving activity tolerance/strength/balance   PT Brief overview of current status CGA gait with FWW 40', Min A sit to supine, Mod A initially progressed to CGA with scooting up in bed    PT Equipment Needed at Discharge walker, rolling  (FWW - pt owns)   Total Session Time   Timed Code Treatment Minutes 17   Total Session Time (sum of timed and untimed services) 27

## 2024-04-30 NOTE — CONSULTS
"CLINICAL NUTRITION SERVICES - ASSESSMENT NOTE     Nutrition Prescription    RECOMMENDATIONS FOR MDs/PROVIDERS TO ORDER:  Recommend supplement Thiamin x 10 days with malnutrition   Recommend replace electrolytes as needed, pt at risk for refeeding     Malnutrition Status:    Moderate malnutrition  In Context of:  Chronic illness or disease    Recommendations already ordered by Registered Dietitian (RD):  ONS- Ensure enlive Vanilla BID  MVI/M daily   New weight     Future/Additional Recommendations:  Monitor po intake, supplement maggie., weight, labs, BM.      REASON FOR ASSESSMENT  Gabi Sandhu is a/an 69 year old female assessed by the dietitian for Provider Order - decreased po intake     HPI: Pt with h/o HLD, DM presented with recurrent falls and generalized weakness for several weeks. Today, pt felt lightheaded, legs gave out, and fell on her back. Pt last ate 3 days ago stating she doesn't feel hungry. Pt will be admitted for fall, GILSON.     NUTRITION HISTORY  Met with pt at bedside this afternoon. Pt reports weakness and inability to prepare her own food at baseline. Pt notes poor appetite and poor oral intakes for at least x4 weeks. Pt notes eating a couple bites of food at meals and becoming full, changes in taste, and reflux. Pt familiar with foods causing reflux. Pt reports unintentional weight loss over the last few weeks, 20 lbs per pt. Pt agreeable to try nutrition supplements. Pt noting last bowel movement occurred \"a couple weeks ago.\"   NKFA    CURRENT NUTRITION ORDERS  Diet: Moderate Consistent Carbohydrate  Intake/Tolerance: Poor <75% >1 month, <50% >4 days     LABS  Reviewed  Na 132(L)   BUN 32.5(H)   Creat 1.86(H)   BG 95     MEDICATIONS  Reviewed   Continuous IVF NaCl 100 ml/hr   Scheduled lipitor, rocephin, novolog, protonix    ANTHROPOMETRICS  Height: 162.6 cm (5' 4\")  Most Recent Weight: 82.1 kg (181 lb)  - stated weight?   IBW: 54.5 kg  BMI: Obesity Grade I BMI 30-34.9  Weight History: 14.2 " % wt loss x6 months   Wt Readings from Last 10 Encounters:   04/29/24 82.1 kg (181 lb)   04/18/24 82.1 kg (181 lb)   10/26/23 95.7 kg (211 lb)   05/02/23 94.7 kg (208 lb 11.2 oz)   06/14/22 94 kg (207 lb 4.8 oz)   02/15/22 91.9 kg (202 lb 11.2 oz)   01/27/22 91.4 kg (201 lb 9.6 oz)   07/10/18 90.9 kg (200 lb 4.8 oz)   06/25/18 90.7 kg (200 lb)   06/19/18 91.1 kg (200 lb 12.8 oz)      Dosing Weight: 61.4 kg adjusted wt    ASSESSED NUTRITION NEEDS  Estimated Energy Needs: 2474-4141 kcals/day (25 - 30 kcals/kg)  Justification: Maintenance  Estimated Protein Needs: 61+ grams protein/day (1+ grams of pro/kg)  Justification: Maintenance, GILSON  Estimated Fluid Needs: 4066-8184 mL/day (25 - 30 mL/kg)   Justification: Maintenance    PHYSICAL FINDINGS/GI CONCERNS  See malnutrition section below.  Per Flowsheets:   Pt notes changes in taste, reflux, early satiety  BM: none noted, last noted >7 days ago per pt    MALNUTRITION:  % Weight Loss:  > 10% in 6 months (severe malnutrition)  % Intake:  <75% for >/= 1 month (moderate malnutrition)  Subcutaneous Fat Loss:  None observed  Muscle Loss:  Temporal region mild depletion, Clavicle bone region mild/moderate depletion, and Acromion bone region mild depletion  Fluid Retention:  None noted    Malnutrition Diagnosis: Moderate malnutrition  In Context of:  Chronic illness or disease    NUTRITION DIAGNOSIS  Malnutrition related to poor appetite, weakness as evidenced by inadequate oral intakes <75% >1 month, unintentional weight loss, muscle losses.       INTERVENTIONS  Implementation  ONS- Ensure enlive Vanilla BID  MVI/M daily   Thiamin x 10 days     Education- emphasized adequate po intakes of kcals and protein, small frequent meals with early satiety and poor appetite.   Reviewed foods that may cause reflux    Goals  Pt to meet >75% nutritional needs   Electrolytes WNL     Monitoring/Evaluation  Progress toward goals will be monitored and evaluated per protocol.

## 2024-04-30 NOTE — CONSULTS
Care Management Initial Consult    General Information  Assessment completed with: Patient,    Type of CM/SW Visit: Initial Assessment    Primary Care Provider verified and updated as needed: Yes   Readmission within the last 30 days: no previous admission in last 30 days      Reason for Consult: discharge planning  Advance Care Planning:            Communication Assessment  Patient's communication style: spoken language (English or Bilingual)             Cognitive  Cognitive/Neuro/Behavioral: WDL                      Living Environment:   People in home: alone     Current living Arrangements: house      Able to return to prior arrangements: yes       Family/Social Support:  Care provided by: self  Provides care for: no one  Marital Status: Single   (friends)          Description of Support System: Supportive, Involved         Current Resources:   Patient receiving home care services: No     Community Resources: None  Equipment currently used at home: none (just got shower chair that is too short and needs to be assembled, hasn't used FWW yet)  Supplies currently used at home: None    Employment/Financial:  Employment Status: retired        Financial Concerns: none   Referral to Financial Worker: No       Does the patient's insurance plan have a 3 day qualifying hospital stay waiver?  Yes     Which insurance plan 3 day waiver is available? Alternative insurance waiver    Will the waiver be used for post-acute placement? No    Lifestyle & Psychosocial Needs:  Social Determinants of Health     Food Insecurity: Unknown (10/26/2023)    Food Insecurity     Within the past 12 months, did you worry that your food would run out before you got money to buy more?: Patient refused     Within the past 12 months, did the food you bought just not last and you didn t have money to get more?: Patient refused   Depression: Not at risk (5/2/2023)    PHQ-2     PHQ-2 Score: 0   Housing Stability: Unknown (10/26/2023)    Housing  Stability     Do you have housing? : Patient refused     Are you worried about losing your housing?: Patient refused   Tobacco Use: Low Risk  (10/26/2023)    Patient History     Smoking Tobacco Use: Never     Smokeless Tobacco Use: Never     Passive Exposure: Not on file   Financial Resource Strain: Unknown (10/26/2023)    Financial Resource Strain     Within the past 12 months, have you or your family members you live with been unable to get utilities (heat, electricity) when it was really needed?: Patient refused   Alcohol Use: Not on file   Transportation Needs: Unknown (10/26/2023)    Transportation Needs     Within the past 12 months, has lack of transportation kept you from medical appointments, getting your medicines, non-medical meetings or appointments, work, or from getting things that you need?: Patient refused   Physical Activity: Not on file   Interpersonal Safety: Low Risk  (10/26/2023)    Interpersonal Safety     Do you feel physically and emotionally safe where you currently live?: Yes     Within the past 12 months, have you been hit, slapped, kicked or otherwise physically hurt by someone?: No     Within the past 12 months, have you been humiliated or emotionally abused in other ways by your partner or ex-partner?: No   Stress: Not on file   Social Connections: Not on file   Health Literacy: Not on file       Functional Status:  Prior to admission patient needed assistance:   Dependent ADLs:: Ambulation-walker  Dependent IADLs:: Independent       Mental Health Status:  Mental Health Status: No Current Concerns       Chemical Dependency Status:  Chemical Dependency Status: No Current Concerns             Values/Beliefs:  Spiritual, Cultural Beliefs, Yazdanism Practices, Values that affect care: no               Additional Information:  SW met with patient, introduced self and role.  Patient reports living in a town house alone. At baseline, patient reports independence with I/ADLs. Patient identifies  positive support from friend, Minoo, who is her healthcare agent (paperwork on file).  OT completed eval with patient prior to SW visit and recommends home care.  SW discussed home care with patient; she verbalizes agreement with referral to [facility] for home care services.  PT meeting with patient after SW visit, will follow for recommendations.  Minoo to transport at discharge.     Home Care referral accepted by [facility] for PT, OT, HHA.     11:42 AM  SW received update from PT indicating recommendation for TCU.  SW met with patient to discuss recommendation.  Patient prefers to return home, although states understanding of recommendation.  SW provided patient with [facility] TCU list. Discussed plan for therapy to continue to work with patient while she remains hospitalized and CM will continue to follow.       KEVIN Nguyen on 04/30/24

## 2024-04-30 NOTE — PROGRESS NOTES
[facility]    Medicine Progress Note - Hospitalist Service    Date of Admission:  4/29/2024    Assessment & Plan   Gabi Sandhu is a 69 year old female admitted on 4/29/2024. She has a h/o HLD, DM presented with recurrent falls and generalized weakness for several weeks. Today, pt felt lightheaded, legs gave out, and fell on her back. Denied hitting head, loss of consciousness, chest pain, shortness of breath. Pt last ate 3 days ago stating she doesn't feel hungry. Per ED, pt BP was 80/50. In ED, BP was borderline, received NS bolus. Labs showed hyponatremia, elevated creatinine, hypomagnesemia, RBS: 145, ketone 2.7. Pt will be admitted for fall, GILSON.     S/p fall, recurrent  Generalized weakness  -- Fall precaution  -- PT/OT  -- Ck: 79  -- CT-head: negative for acute intracranial process     GILSON  -- Likely 2/2 dehydration  -- FENA, US-renal  -- Cont normal saline. Hold ACEI     AG metabolic acidosis  Ketonemia  -- Likely 2/2 dehydration  -- Ketosis likely 2/2 starvation  given RBS: 145 and pt not on SGL2I  -- ABG. Ph: 7.36.     Elevated d-dimer  -- No shortness of breath.  Saturating well on RA  -- Left leg seems larger than left. No tenderness.  -- US-duplex- LE negative for DVT     Possible UTI  -- U/A: LE+, bacteriuria  -- Urine culture- E coli. Cont ceftriaxone  -- B/C reported to be positive for staph species (1/2 bottles). Suspect this is contamination. Check procal. Check nasal swab for MRSA. Cont IV Rocephin for now     Hyponatremia  Hypochloremia  -- likely 2/2 dehydration  -- Makayla, Uosm  -- IV with NS     Hypomagnesemia  -- S/p mag sulfate 2 gm in ED  -- Monitor Mg level in am labs     Elevated troponin  -- Troponins flat. No chest pain. EKG: non specific ST waves in lateral leads  -- Likely 2/2 demand ischemia (type II MI)     DM-II  -- Not on meds  -- RBS: 145  -- A1c in 10/26/2023: 6.9. will repeat A1c  -- Accu-checks, moderate sliding scale     Essential hypertension  --  "Hold lisinopril due to GILSON  -- Monitor vital signs per protocol  -- Hydralazin iv prn     HLD  -- C/w PTA lipitor.      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 Indication: Electrolyte Imbalance (24 hours)- Magnesium <1.3 mg/ml; Potassium < =2.8 or > 5.5 mg/ml  Code Status: No CPR- Pre-arrest intubation OK      Clinically Significant Risk Factors         # Hyponatremia: Lowest Na = 128 mmol/L in last 2 days, will monitor as appropriate   # Hypercalcemia: Highest Ca = 10.2 mg/dL in last 2 days, will monitor as appropriate  # Hypomagnesemia: Lowest Mg = 1 mg/dL in last 2 days, will replace as needed  # Anion Gap Metabolic Acidosis: Highest Anion Gap = 22 mmol/L in last 2 days, will monitor and treat as appropriate      # Hypertension: Noted on problem list        # Obesity: Estimated body mass index is 31.07 kg/m  as calculated from the following:    Height as of this encounter: 1.626 m (5' 4\").    Weight as of this encounter: 82.1 kg (181 lb)., PRESENT ON ADMISSION  # Moderate Malnutrition: based on nutrition assessment, PRESENT ON ADMISSION   # Financial/Environmental Concerns: none         Disposition Plan     Medically Ready for Discharge: Anticipated in 2-4 Days             ANETA Martinez  Hospitalist Service  [facility]  Securely message with Kayla (more info)  Text page via [employer] Paging/Directory   ______________________________________________________________________    Interval History   Patient is new to me today. Patient reports feeling better, but still weak in her " legs. Patient reports some stuffy nose. No fever or chills. No chest pain. Patient reports living in a town house on her own with some help from her friends. States that she has no family members.     Physical Exam   Vital Signs: Temp: 98  F (36.7  C) Temp src: Oral BP: (!) 154/70 (RN notified) Pulse: 81   Resp: 20 SpO2: 98 % O2 Device: None (Room air)    Weight: 181 lbs 0 oz      General: Not in obvious distress.  HEENT: NC, AT   Chest: Clear to auscultation bilaterally  Heart: S1S2 normal, regular. No M/R/G  Abdomen: Soft. NT, ND. Bowel sounds- active.  Extremities: No legs swelling  Neuro: Alert and awake, grossly non-focal      Medical Decision Making             Data     I have personally reviewed the following data over the past 24 hrs:    7.2  \   11.8   / 169     132 (L) 98 32.5 (H) /  113 (H)   4.1 15 (L) 1.86 (H) \     Trop: N/A BNP: N/A     TSH: N/A T4: N/A A1C: N/A     Procal: 0.07 CRP: N/A Lactic Acid: N/A       INR:  N/A PTT:  N/A   D-dimer:  N/A Fibrinogen:  N/A       Imaging results reviewed over the past 24 hrs:   Recent Results (from the past 24 hour(s))   US Lower Extremity Venous Duplex Bilateral    Narrative    EXAM: US LOWER EXTREMITY VENOUS DUPLEX BILATERAL  LOCATION: [facility]  DATE: 4/30/2024    INDICATION: elevated d dimer  COMPARISON: None.  TECHNIQUE: Venous Duplex ultrasound of bilateral lower extremities with and without compression, augmentation and duplex. Color flow and spectral Doppler with waveform analysis performed.    FINDINGS: Exam includes the common femoral, femoral, popliteal veins as well as segmentally visualized deep calf veins and greater saphenous vein.     RIGHT: No deep vein thrombosis. No superficial thrombophlebitis. No popliteal cyst.    LEFT: No deep vein thrombosis. No superficial thrombophlebitis. No popliteal cyst.      Impression    IMPRESSION:  1.  No deep venous thrombosis in the bilateral lower extremities.   US Renal Complete  Non-Vascular    Narrative    EXAM: US RENAL COMPLETE NON-VASCULAR  LOCATION: [facility]  DATE: 4/30/2024    INDICATION: GILSON.  COMPARISON: CT abdomen and pelvis without IV contrast 09/18/2006.  TECHNIQUE: Routine Bilateral Renal and Bladder Ultrasound.    FINDINGS:    RIGHT KIDNEY: 9.4 x 5.9 x 5.3 cm. No stones, masses or hydronephrosis. Normal parenchymal thickness and echogenicity.     LEFT KIDNEY: 9.2 x 4.1 x 5.1 cm. No stones, masses or hydronephrosis. Normal parenchymal thickness and echogenicity.     BLADDER: Normal without dependent debris or stone material. Ureteral jets not seen during sonogram.      Impression    IMPRESSION: Normal study.

## 2024-04-30 NOTE — PROGRESS NOTES
Patient A&Ox4, with some intermittent forgetfulness. Patient vitally stable on room air. Denies pain. Inconsistently incontinent. Utilizing purewick at times per patient request. Up with SBA w/ wheelchair and gait belt. Eating 50-75% of meals. On telemetry; NSR. Added ensure supplements. Patient went to P405 around 1600. Given report to NABILA Hernandez. Patient agreed to plan. Transferred without issue.

## 2024-05-01 ENCOUNTER — APPOINTMENT (OUTPATIENT)
Dept: PHYSICAL THERAPY | Facility: HOSPITAL | Age: 69
DRG: 682 | End: 2024-05-01
Payer: COMMERCIAL

## 2024-05-01 LAB
ANION GAP SERPL CALCULATED.3IONS-SCNC: 11 MMOL/L (ref 7–15)
ATRIAL RATE - MUSE: 89 BPM
BUN SERPL-MCNC: 21.5 MG/DL (ref 8–23)
CALCIUM SERPL-MCNC: 9.4 MG/DL (ref 8.8–10.2)
CHLORIDE SERPL-SCNC: 103 MMOL/L (ref 98–107)
CREAT SERPL-MCNC: 1.2 MG/DL (ref 0.51–0.95)
DEPRECATED HCO3 PLAS-SCNC: 19 MMOL/L (ref 22–29)
DIASTOLIC BLOOD PRESSURE - MUSE: 63 MMHG
EGFRCR SERPLBLD CKD-EPI 2021: 48 ML/MIN/1.73M2
GLUCOSE BLDC GLUCOMTR-MCNC: 100 MG/DL (ref 70–99)
GLUCOSE BLDC GLUCOMTR-MCNC: 112 MG/DL (ref 70–99)
GLUCOSE BLDC GLUCOMTR-MCNC: 140 MG/DL (ref 70–99)
GLUCOSE BLDC GLUCOMTR-MCNC: 163 MG/DL (ref 70–99)
GLUCOSE BLDC GLUCOMTR-MCNC: 163 MG/DL (ref 70–99)
GLUCOSE SERPL-MCNC: 112 MG/DL (ref 70–99)
INTERPRETATION ECG - MUSE: NORMAL
MAGNESIUM SERPL-MCNC: 1.4 MG/DL (ref 1.7–2.3)
MAGNESIUM SERPL-MCNC: 2.5 MG/DL (ref 1.7–2.3)
P AXIS - MUSE: 70 DEGREES
POTASSIUM SERPL-SCNC: 4 MMOL/L (ref 3.4–5.3)
PR INTERVAL - MUSE: 186 MS
QRS DURATION - MUSE: 74 MS
QT - MUSE: 368 MS
QTC - MUSE: 447 MS
R AXIS - MUSE: 31 DEGREES
SODIUM SERPL-SCNC: 133 MMOL/L (ref 135–145)
SYSTOLIC BLOOD PRESSURE - MUSE: 104 MMHG
T AXIS - MUSE: 81 DEGREES
VENTRICULAR RATE- MUSE: 89 BPM

## 2024-05-01 PROCEDURE — 99232 SBSQ HOSP IP/OBS MODERATE 35: CPT | Performed by: INTERNAL MEDICINE

## 2024-05-01 PROCEDURE — 250N000013 HC RX MED GY IP 250 OP 250 PS 637: Performed by: STUDENT IN AN ORGANIZED HEALTH CARE EDUCATION/TRAINING PROGRAM

## 2024-05-01 PROCEDURE — 97530 THERAPEUTIC ACTIVITIES: CPT | Mod: GP

## 2024-05-01 PROCEDURE — 83735 ASSAY OF MAGNESIUM: CPT | Performed by: INTERNAL MEDICINE

## 2024-05-01 PROCEDURE — 80048 BASIC METABOLIC PNL TOTAL CA: CPT | Performed by: INTERNAL MEDICINE

## 2024-05-01 PROCEDURE — 258N000003 HC RX IP 258 OP 636: Performed by: STUDENT IN AN ORGANIZED HEALTH CARE EDUCATION/TRAINING PROGRAM

## 2024-05-01 PROCEDURE — 97116 GAIT TRAINING THERAPY: CPT | Mod: GP

## 2024-05-01 PROCEDURE — 250N000011 HC RX IP 250 OP 636: Performed by: STUDENT IN AN ORGANIZED HEALTH CARE EDUCATION/TRAINING PROGRAM

## 2024-05-01 PROCEDURE — 250N000013 HC RX MED GY IP 250 OP 250 PS 637: Performed by: INTERNAL MEDICINE

## 2024-05-01 PROCEDURE — 250N000011 HC RX IP 250 OP 636: Performed by: INTERNAL MEDICINE

## 2024-05-01 PROCEDURE — 36415 COLL VENOUS BLD VENIPUNCTURE: CPT | Performed by: INTERNAL MEDICINE

## 2024-05-01 PROCEDURE — 120N000001 HC R&B MED SURG/OB

## 2024-05-01 RX ORDER — MAGNESIUM SULFATE 4 G/50ML
4 INJECTION INTRAVENOUS ONCE
Status: COMPLETED | OUTPATIENT
Start: 2024-05-01 | End: 2024-05-01

## 2024-05-01 RX ADMIN — CEFTRIAXONE SODIUM 1 G: 1 INJECTION, POWDER, FOR SOLUTION INTRAMUSCULAR; INTRAVENOUS at 08:13

## 2024-05-01 RX ADMIN — TIMOLOL MALEATE 1 DROP: 5 SOLUTION/ DROPS OPHTHALMIC at 08:19

## 2024-05-01 RX ADMIN — ACETAMINOPHEN 650 MG: 325 TABLET ORAL at 06:39

## 2024-05-01 RX ADMIN — ASPIRIN 81 MG: 81 TABLET, COATED ORAL at 08:12

## 2024-05-01 RX ADMIN — MAGNESIUM SULFATE HEPTAHYDRATE 4 G: 80 INJECTION, SOLUTION INTRAVENOUS at 09:01

## 2024-05-01 RX ADMIN — SODIUM CHLORIDE: 9 INJECTION, SOLUTION INTRAVENOUS at 04:14

## 2024-05-01 RX ADMIN — ATORVASTATIN CALCIUM 40 MG: 40 TABLET, FILM COATED ORAL at 20:21

## 2024-05-01 RX ADMIN — PANTOPRAZOLE SODIUM 40 MG: 40 TABLET, DELAYED RELEASE ORAL at 06:40

## 2024-05-01 RX ADMIN — THERA TABS 1 TABLET: TAB at 08:12

## 2024-05-01 ASSESSMENT — ACTIVITIES OF DAILY LIVING (ADL)
ADLS_ACUITY_SCORE: 37
ADLS_ACUITY_SCORE: 37
ADLS_ACUITY_SCORE: 43
ADLS_ACUITY_SCORE: 37
ADLS_ACUITY_SCORE: 40
ADLS_ACUITY_SCORE: 43
ADLS_ACUITY_SCORE: 43
ADLS_ACUITY_SCORE: 41
ADLS_ACUITY_SCORE: 43
ADLS_ACUITY_SCORE: 41
ADLS_ACUITY_SCORE: 37
ADLS_ACUITY_SCORE: 43
ADLS_ACUITY_SCORE: 43
ADLS_ACUITY_SCORE: 37
ADLS_ACUITY_SCORE: 41
ADLS_ACUITY_SCORE: 43
ADLS_ACUITY_SCORE: 41
ADLS_ACUITY_SCORE: 43
ADLS_ACUITY_SCORE: 41
ADLS_ACUITY_SCORE: 44

## 2024-05-01 NOTE — PLAN OF CARE
"  Problem: Adult Inpatient Plan of Care  Goal: Plan of Care Review  Description: The Plan of Care Review/Shift note should be completed every shift.  The Outcome Evaluation is a brief statement about your assessment that the patient is improving, declining, or no change.  This information will be displayed automatically on your shift  note.  Outcome: Progressing  Goal: Patient-Specific Goal (Individualized)  Description: You can add care plan individualizations to a care plan. Examples of Individualization might be:  \"Parent requests to be called daily at 9am for status\", \"I have a hard time hearing out of my right ear\", or \"Do not touch me to wake me up as it startles  me\".  Outcome: Progressing  Goal: Absence of Hospital-Acquired Illness or Injury  Outcome: Progressing  Intervention: Identify and Manage Fall Risk  Recent Flowsheet Documentation  Taken 5/1/2024 0100 by Mary Aldana, RN  Safety Promotion/Fall Prevention: activity supervised  Intervention: Prevent Skin Injury  Recent Flowsheet Documentation  Taken 5/1/2024 0100 by Mary Aldana RN  Body Position: position changed independently  Intervention: Prevent Infection  Recent Flowsheet Documentation  Taken 5/1/2024 0100 by Mary Aldana, RN  Infection Prevention:   rest/sleep promoted   single patient room provided  Goal: Optimal Comfort and Wellbeing  Outcome: Progressing  Intervention: Monitor Pain and Promote Comfort  Recent Flowsheet Documentation  Taken 5/1/2024 0639 by Mary Aldana, RN  Pain Management Interventions: medication (see MAR)  Goal: Readiness for Transition of Care  Outcome: Progressing   Goal Outcome Evaluation:             Pt [place] but a/o with VSS. Pt c/o slight headache on the left side this am. Pt given prn tylenol. IVF running as ordered. Pt sleeping well between cares. Will monitor.            "

## 2024-05-01 NOTE — PLAN OF CARE
Problem: Adult Inpatient Plan of Care  Goal: Plan of Care Review  Description: The Plan of Care Review/Shift note should be completed every shift.  The Outcome Evaluation is a brief statement about your assessment that the patient is improving, declining, or no change.  This information will be displayed automatically on your shift  note.  4/30/2024 2214 by Daysi Clark RN  Outcome: Progressing  4/30/2024 2213 by Daysi Clark RN  Outcome: Progressing  Goal: Readiness for Transition of Care  Intervention: Mutually Develop Transition Plan  Recent Flowsheet Documentation  Taken 4/30/2024 2200 by Daysi Clark RN  Equipment Currently Used at Home: (just purchased FWW) none     Problem: Risk for Delirium  Goal: Optimal Coping  Intervention: Optimize Psychosocial Adjustment to Delirium  Recent Flowsheet Documentation  Taken 4/30/2024 1613 by Daysi Clark RN  Supportive Measures:   active listening utilized   goal-setting facilitated   self-care encouraged   self-reflection promoted  Goal: Improved Behavioral Control  Intervention: Prevent and Manage Agitation  Recent Flowsheet Documentation  Taken 4/30/2024 1613 by Daysi Clark RN  Environment Familiarity/Consistency:   daily routine followed   personal clothing/items utilized  Intervention: Minimize Safety Risk  Recent Flowsheet Documentation  Taken 4/30/2024 1613 by Daysi Clark RN  Communication Enhancement Strategies:   call light answered in person   family/caregiver assisted with communication  Goal: Improved Attention and Thought Clarity  Intervention: Maximize Cognitive Function  Recent Flowsheet Documentation  Taken 4/30/2024 1613 by Daysi Clark RN  Sensory Stimulation Regulation:   television on   care clustered  Reorientation Measures:   clock in view   familiar social contact encouraged  Goal: Improved Sleep  Outcome: Progressing     Problem: Fall Injury Risk  Goal: Absence of Fall and Fall-Related Injury  Outcome:  Progressing   Goal Outcome Evaluation:       Pt alert and oriented x4, forgetful. Vitally stable on room air. Purewick on at night, draining yellow urine. Ate 75% dinner. Denies pain. LLE more edematous than RLE. NS running at 100mL/hr.

## 2024-05-01 NOTE — PLAN OF CARE
"  Problem: Adult Inpatient Plan of Care  Goal: Plan of Care Review  Description: The Plan of Care Review/Shift note should be completed every shift.  The Outcome Evaluation is a brief statement about your assessment that the patient is improving, declining, or no change.  This information will be displayed automatically on your shift  note.  Outcome: Progressing     Problem: Adult Inpatient Plan of Care  Goal: Patient-Specific Goal (Individualized)  Description: You can add care plan individualizations to a care plan. Examples of Individualization might be:  \"Parent requests to be called daily at 9am for status\", \"I have a hard time hearing out of my right ear\", or \"Do not touch me to wake me up as it startles  me\".  Outcome: Progressing   Goal Outcome Evaluation:         No verbal or nonverbal expressions of pain, able to verbalize needs, redness at coccyx site, mepilex applied, NS 100cc/hr, IV ABX's scheduled               "

## 2024-05-01 NOTE — PROGRESS NOTES
[facility]    Medicine Progress Note - Hospitalist Service    Date of Admission:  4/29/2024    Assessment & Plan   Gabi Sandhu is a 69 year old female admitted on 4/29/2024. She has a h/o HLD, DM presented with recurrent falls and generalized weakness for several weeks. Today, pt felt lightheaded, legs gave out, and fell on her back. Denied hitting head, loss of consciousness, chest pain, shortness of breath. Pt last ate 3 days ago stating she doesn't feel hungry. Per ED, pt BP was 80/50. In ED, BP was borderline, received NS bolus. Labs showed hyponatremia, elevated creatinine, hypomagnesemia, RBS: 145, ketone 2.7. Pt will be admitted for fall, GILSON.     S/p fall, recurrent  Generalized weakness  -- Fall precaution  -- PT/OT  -- Ck: 79  -- CT-head: negative for acute intracranial process     GILSON  -- Likely 2/2 dehydration. Better with hydration. Stopped IVF today. Baseline creatinine is about 0.8. Presented with creatinine of 2.3  -- Renal US- hydronephrosis ruled out  -- Cont normal saline. Hold ACEI     AG metabolic acidosis  Ketonemia  -- Likely 2/2 dehydration  -- Ketosis likely 2/2 starvation  given RBS: 145 and pt not on SGL2I  -- ABG. Ph: 7.36.     Elevated d-dimer  -- No shortness of breath.  Saturating well on RA  -- Left leg seems larger than left. No tenderness.  -- US-duplex- LE negative for DVT     Acute cystitis without hematuria  -- Urine culture- E coli. Cont ceftriaxone  -- B/C reported to be positive for staph species (1/2 bottles). Suspect this is contamination. Procal- normal. Check nasal swab for MRSA- negative. Cont IV Rocephin for now     Hyponatremia  -- Due to dehydration. Improved from 128-133     Hypomagnesemia  -- S/p mag sulfate 2 gm in ED  -- Monitor Mg level in am labs     Elevated troponin  -- Troponins flat. No chest pain. EKG: non specific ST waves in lateral leads  -- Likely 2/2 demand ischemia (type II MI)     DM-II  -- Not on meds  -- RBS: 145  --  "A1c- 5.6  -- Accu-checks, moderate sliding scale     Essential hypertension  -- Hold lisinopril due to GILSON  -- Monitor vital signs per protocol  -- Hydralazin iv prn     HLD  -- C/w PTA lipitor.      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  -- ED consulted orthopedics: no intervention     Glaucoma  -- C/w PTA timoptic     Decreased po intake  -- RD consulted          Diet: Combination Diet Moderate Consistent Carb (60 g CHO per Meal) Diet  Snacks/Supplements Adult: Ensure Enlive; With Meals    DVT Prophylaxis: Pneumatic Compression Devices  Holland Catheter: Not present  Lines: None     Cardiac Monitoring: None  Code Status: No CPR- Pre-arrest intubation OK      Clinically Significant Risk Factors            # Hypomagnesemia: Lowest Mg = 1.4 mg/dL in last 2 days, will replace as needed  # Anion Gap Metabolic Acidosis: Highest Anion Gap = 19 mmol/L in last 2 days, will monitor and treat as appropriate      # Hypertension: Noted on problem list        # Obesity: Estimated body mass index is 31.07 kg/m  as calculated from the following:    Height as of this encounter: 1.626 m (5' 4\").    Weight as of this encounter: 82.1 kg (181 lb)., PRESENT ON ADMISSION  # Moderate Malnutrition: based on nutrition assessment, PRESENT ON ADMISSION     # Financial/Environmental Concerns: none         Disposition Plan     Medically Ready for Discharge: Likely home on/around Friday             ANETA Martinez  Hospitalist Service  [facility]  Securely message with [employer] (more info)  Text page via [employer] Paging/Directory   ______________________________________________________________________    Interval History   Patient reports doing better. Offered no new complaints. She has now changed her mind that she would not go to TCU even if that is recommended. This was discussed with patient's friend and POA (Minoo) over the speaker phone from patient's room.     Physical Exam   Vital " Signs: Temp: 98.2  F (36.8  C) Temp src: Oral BP: 107/55 Pulse: 66   Resp: 17 SpO2: 99 % O2 Device: None (Room air)    Weight: 181 lbs 0 oz      General: Not in obvious distress.  HEENT: NC, AT   Chest: Clear to auscultation bilaterally  Heart: S1S2 normal, regular. No M/R/G  Abdomen: Soft. NT, ND. Bowel sounds- active.  Extremities: No legs swelling  Neuro: Alert and awake, grossly non-focal      Medical Decision Making             Data     I have personally reviewed the following data over the past 24 hrs:    N/A  \   N/A   / N/A     133 (L) 103 21.5 /  163 (H)   4.0 19 (L) 1.20 (H) \       Imaging results reviewed over the past 24 hrs:   No results found for this or any previous visit (from the past 24 hour(s)).

## 2024-05-02 ENCOUNTER — APPOINTMENT (OUTPATIENT)
Dept: OCCUPATIONAL THERAPY | Facility: HOSPITAL | Age: 69
DRG: 682 | End: 2024-05-02
Payer: COMMERCIAL

## 2024-05-02 ENCOUNTER — APPOINTMENT (OUTPATIENT)
Dept: PHYSICAL THERAPY | Facility: HOSPITAL | Age: 69
DRG: 682 | End: 2024-05-02
Payer: COMMERCIAL

## 2024-05-02 ENCOUNTER — PATIENT OUTREACH (OUTPATIENT)
Dept: CARE COORDINATION | Facility: CLINIC | Age: 69
End: 2024-05-02
Payer: COMMERCIAL

## 2024-05-02 LAB
ANION GAP SERPL CALCULATED.3IONS-SCNC: 10 MMOL/L (ref 7–15)
BACTERIA BLD CULT: ABNORMAL
BACTERIA BLD CULT: ABNORMAL
BUN SERPL-MCNC: 18.3 MG/DL (ref 8–23)
CALCIUM SERPL-MCNC: 9.3 MG/DL (ref 8.8–10.2)
CHLORIDE SERPL-SCNC: 103 MMOL/L (ref 98–107)
CREAT SERPL-MCNC: 0.94 MG/DL (ref 0.51–0.95)
DEPRECATED HCO3 PLAS-SCNC: 21 MMOL/L (ref 22–29)
EGFRCR SERPLBLD CKD-EPI 2021: 65 ML/MIN/1.73M2
GLUCOSE BLDC GLUCOMTR-MCNC: 148 MG/DL (ref 70–99)
GLUCOSE BLDC GLUCOMTR-MCNC: 160 MG/DL (ref 70–99)
GLUCOSE BLDC GLUCOMTR-MCNC: 170 MG/DL (ref 70–99)
GLUCOSE BLDC GLUCOMTR-MCNC: 184 MG/DL (ref 70–99)
GLUCOSE BLDC GLUCOMTR-MCNC: 99 MG/DL (ref 70–99)
GLUCOSE SERPL-MCNC: 123 MG/DL (ref 70–99)
HOLD SPECIMEN: NORMAL
MAGNESIUM SERPL-MCNC: 1.8 MG/DL (ref 1.7–2.3)
POTASSIUM SERPL-SCNC: 3.9 MMOL/L (ref 3.4–5.3)
SODIUM SERPL-SCNC: 134 MMOL/L (ref 135–145)

## 2024-05-02 PROCEDURE — 250N000011 HC RX IP 250 OP 636: Performed by: STUDENT IN AN ORGANIZED HEALTH CARE EDUCATION/TRAINING PROGRAM

## 2024-05-02 PROCEDURE — 97110 THERAPEUTIC EXERCISES: CPT | Mod: GP | Performed by: PHYSICAL THERAPIST

## 2024-05-02 PROCEDURE — 83735 ASSAY OF MAGNESIUM: CPT | Performed by: INTERNAL MEDICINE

## 2024-05-02 PROCEDURE — 250N000013 HC RX MED GY IP 250 OP 250 PS 637: Performed by: STUDENT IN AN ORGANIZED HEALTH CARE EDUCATION/TRAINING PROGRAM

## 2024-05-02 PROCEDURE — 250N000013 HC RX MED GY IP 250 OP 250 PS 637: Performed by: INTERNAL MEDICINE

## 2024-05-02 PROCEDURE — 80048 BASIC METABOLIC PNL TOTAL CA: CPT | Performed by: INTERNAL MEDICINE

## 2024-05-02 PROCEDURE — 36415 COLL VENOUS BLD VENIPUNCTURE: CPT | Performed by: INTERNAL MEDICINE

## 2024-05-02 PROCEDURE — 97535 SELF CARE MNGMENT TRAINING: CPT | Mod: GO

## 2024-05-02 PROCEDURE — 99232 SBSQ HOSP IP/OBS MODERATE 35: CPT | Performed by: INTERNAL MEDICINE

## 2024-05-02 PROCEDURE — 120N000001 HC R&B MED SURG/OB

## 2024-05-02 PROCEDURE — 97116 GAIT TRAINING THERAPY: CPT | Mod: GP | Performed by: PHYSICAL THERAPIST

## 2024-05-02 RX ADMIN — PANTOPRAZOLE SODIUM 40 MG: 40 TABLET, DELAYED RELEASE ORAL at 06:45

## 2024-05-02 RX ADMIN — THERA TABS 1 TABLET: TAB at 09:28

## 2024-05-02 RX ADMIN — CEFTRIAXONE SODIUM 1 G: 1 INJECTION, POWDER, FOR SOLUTION INTRAMUSCULAR; INTRAVENOUS at 09:31

## 2024-05-02 RX ADMIN — ACETAMINOPHEN 650 MG: 325 TABLET ORAL at 00:55

## 2024-05-02 RX ADMIN — SENNOSIDES AND DOCUSATE SODIUM 1 TABLET: 8.6; 5 TABLET ORAL at 06:50

## 2024-05-02 RX ADMIN — ATORVASTATIN CALCIUM 40 MG: 40 TABLET, FILM COATED ORAL at 19:22

## 2024-05-02 RX ADMIN — TIMOLOL MALEATE 1 DROP: 5 SOLUTION/ DROPS OPHTHALMIC at 09:28

## 2024-05-02 RX ADMIN — ASPIRIN 81 MG: 81 TABLET, COATED ORAL at 09:28

## 2024-05-02 ASSESSMENT — ACTIVITIES OF DAILY LIVING (ADL)
ADLS_ACUITY_SCORE: 35
ADLS_ACUITY_SCORE: 35
ADLS_ACUITY_SCORE: 43
ADLS_ACUITY_SCORE: 43
ADLS_ACUITY_SCORE: 35
ADLS_ACUITY_SCORE: 35
ADLS_ACUITY_SCORE: 43
ADLS_ACUITY_SCORE: 35
ADLS_ACUITY_SCORE: 35
ADLS_ACUITY_SCORE: 43
ADLS_ACUITY_SCORE: 43
ADLS_ACUITY_SCORE: 35
ADLS_ACUITY_SCORE: 43
ADLS_ACUITY_SCORE: 35
ADLS_ACUITY_SCORE: 43

## 2024-05-02 NOTE — PLAN OF CARE
Problem: Adult Inpatient Plan of Care  Goal: Absence of Hospital-Acquired Illness or Injury  Outcome: Progressing  Intervention: Identify and Manage Fall Risk  Recent Flowsheet Documentation  Taken 5/1/2024 1645 by Boris Galo RN  Safety Promotion/Fall Prevention:   activity supervised   assistive device/personal items within reach   clutter free environment maintained   lighting adjusted   nonskid shoes/slippers when out of bed   patient and family education   safety round/check completed   supervised activity     Problem: Adult Inpatient Plan of Care  Goal: Optimal Comfort and Wellbeing  Outcome: Progressing     Problem: Risk for Delirium  Goal: Optimal Coping  Outcome: Progressing  Goal: Improved Behavioral Control  Outcome: Progressing  Intervention: Minimize Safety Risk  Recent Flowsheet Documentation  Taken 5/1/2024 1645 by Boris Galo RN  Enhanced Safety Measures:   assistive devices when indicated   pain management  Goal: Improved Attention and Thought Clarity  Outcome: Progressing  Goal: Improved Sleep  Outcome: Progressing     Problem: Skin Injury Risk Increased  Goal: Skin Health and Integrity  Outcome: Progressing  Intervention: Plan: Nurse Driven Intervention: Moisture Management  Recent Flowsheet Documentation  Taken 5/1/2024 1645 by Boris Galo RN  Moisture Interventions: Encourage regular toileting  Intervention: Plan: Nurse Driven Intervention: Friction and Shear  Recent Flowsheet Documentation  Taken 5/1/2024 1645 by Boris Galo RN  Friction/Shear Interventions: HOB 30 degrees or less     Problem: Fall Injury Risk  Goal: Absence of Fall and Fall-Related Injury  Outcome: Progressing  Intervention: Promote Injury-Free Environment  Recent Flowsheet Documentation  Taken 5/1/2024 1645 by oBris Galo RN  Safety Promotion/Fall Prevention:   activity supervised   assistive device/personal items within reach   clutter free environment maintained   lighting adjusted   nonskid shoes/slippers  when out of bed   patient and family education   safety round/check completed   supervised activity     Problem: Comorbidity Management  Goal: Blood Glucose Levels Within Targeted Range  Outcome: Progressing     Problem: UTI (Urinary Tract Infection)  Goal: Improved Infection Symptoms  Outcome: Progressing     Pt alert and oriented. Denied pain thus far. Pre-prandial BG of 140 and pt refused insulin even after education. HS BG of 163. PIV saline lock. Pt is a stand by assist/assist of 1 during transfers.

## 2024-05-02 NOTE — PLAN OF CARE
Problem: Adult Inpatient Plan of Care  Goal: Plan of Care Review  Description: The Plan of Care Review/Shift note should be completed every shift.  The Outcome Evaluation is a brief statement about your assessment that the patient is improving, declining, or no change.  This information will be displayed automatically on your shift  note.  Outcome: Progressing     Problem: Adult Inpatient Plan of Care  Goal: Absence of Hospital-Acquired Illness or Injury  Outcome: Progressing     Problem: Risk for Delirium  Goal: Optimal Coping  Intervention: Optimize Psychosocial Adjustment to Delirium  Recent Flowsheet Documentation  Taken 5/2/2024 0800 by Boris Yeung, RN  Supportive Measures: active listening utilized     Problem: Skin Injury Risk Increased  Goal: Skin Health and Integrity  Intervention: Plan: Nurse Driven Intervention: Moisture Management  Recent Flowsheet Documentation  Taken 5/2/2024 0800 by Boris Yeung, RN  Moisture Interventions: Encourage regular toileting  Bathing/Skin Care: linen changed   Goal Outcome Evaluation:  No verbal or nonverbal expressions of pain, able to express needs, magnesium protocol, BG 99. VS WDL.

## 2024-05-02 NOTE — PLAN OF CARE
"  Problem: Adult Inpatient Plan of Care  Goal: Plan of Care Review  Description: The Plan of Care Review/Shift note should be completed every shift.  The Outcome Evaluation is a brief statement about your assessment that the patient is improving, declining, or no change.  This information will be displayed automatically on your shift  note.  Outcome: Progressing  Goal: Patient-Specific Goal (Individualized)  Description: You can add care plan individualizations to a care plan. Examples of Individualization might be:  \"Parent requests to be called daily at 9am for status\", \"I have a hard time hearing out of my right ear\", or \"Do not touch me to wake me up as it startles  me\".  Outcome: Progressing  Goal: Absence of Hospital-Acquired Illness or Injury  Outcome: Progressing  Intervention: Identify and Manage Fall Risk  Recent Flowsheet Documentation  Taken 5/2/2024 0055 by Mary Aldana, RN  Safety Promotion/Fall Prevention:   activity supervised   assistive device/personal items within reach   clutter free environment maintained   lighting adjusted   nonskid shoes/slippers when out of bed   patient and family education   safety round/check completed   supervised activity  Intervention: Prevent Skin Injury  Recent Flowsheet Documentation  Taken 5/2/2024 0055 by Mray Aldana, RN  Body Position: position changed independently  Intervention: Prevent Infection  Recent Flowsheet Documentation  Taken 5/2/2024 0055 by Mary Aldana, RN  Infection Prevention:   rest/sleep promoted   single patient room provided  Goal: Optimal Comfort and Wellbeing  Outcome: Progressing  Intervention: Monitor Pain and Promote Comfort  Recent Flowsheet Documentation  Taken 5/2/2024 0055 by Mary Aldana, RN  Pain Management Interventions: medication (see MAR)  Goal: Readiness for Transition of Care  Outcome: Progressing   Goal Outcome Evaluation:             Pt a/o with VSS on room air. Pt with c/o minimal abdominal pain " improved with prn tylenol. Pt denies constipation but says not passing flatus. Pt refusing prn bowel medications at this time.  Will monitor.

## 2024-05-02 NOTE — PROGRESS NOTES
[facility]    Medicine Progress Note - Hospitalist Service    Date of Admission:  4/29/2024    Assessment & Plan   Gabi Sandhu is a 69 year old female admitted on 4/29/2024. She has a h/o HLD, DM presented with recurrent falls and generalized weakness for several weeks. Today, pt felt lightheaded, legs gave out, and fell on her back. Denied hitting head, loss of consciousness, chest pain, shortness of breath. Pt last ate 3 days ago stating she doesn't feel hungry. Per ED, pt BP was 80/50. In ED, BP was borderline, received NS bolus. Labs showed hyponatremia, elevated creatinine, hypomagnesemia, RBS: 145, ketone 2.7. Pt will be admitted for fall, GILSON.     S/p fall, recurrent  Generalized weakness  -- Fall precaution  -- PT/OT  -- Ck: 79  -- CT-head: negative for acute intracranial process     GILSON  -- Likely 2/2 dehydration. Better with hydration. Stopped IVF. Baseline creatinine is about 0.8. Presented with creatinine of 2.3. Resolved now  -- Renal US- hydronephrosis ruled out  -- Hold ACEI. Resume tomorrow if BP is not low.     AG metabolic acidosis  Ketonemia  -- Likely 2/2 dehydration  -- Ketosis likely 2/2 starvation  given RBS: 145 and pt not on SGL2I  -- ABG. Ph: 7.36.     Elevated d-dimer  -- No shortness of breath.  Saturating well on RA  -- Left leg seems larger than left. No tenderness.  -- US-duplex- LE negative for DVT     Acute cystitis without hematuria  -- Urine culture- E coli. Cont ceftriaxone  -- B/C reported to be positive for staph species (1/2 bottles). Suspect this is contamination. Procal- normal. Check nasal swab for MRSA- negative. Cont IV Rocephin for now     Hyponatremia  -- Due to dehydration. Improved from 128-133     Hypomagnesemia  -- S/p mag sulfate 2 gm in ED  -- Monitor Mg level in am labs     Elevated troponin  -- Troponins flat. No chest pain. EKG: non specific ST waves in lateral leads  -- Likely 2/2 demand ischemia (type II MI)     DM-II  -- Not on  "meds  -- RBS: 145  -- A1c- 5.6  -- Accu-checks, moderate sliding scale     Essential hypertension  -- Hold lisinopril due to GILSON  -- Monitor vital signs per protocol  -- Hydralazin iv prn     HLD  -- C/w PTA lipitor.      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  -- ED consulted orthopedics: no intervention     Glaucoma  -- C/w PTA timoptic     Decreased po intake  -- RD consulted          Diet: Combination Diet Moderate Consistent Carb (60 g CHO per Meal) Diet  Snacks/Supplements Adult: Ensure Enlive; With Meals    DVT Prophylaxis: Pneumatic Compression Devices  Holland Catheter: Not present  Lines: None     Cardiac Monitoring: None  Code Status: No CPR- Pre-arrest intubation OK      Clinically Significant Risk Factors            # Hypomagnesemia: Lowest Mg = 1.4 mg/dL in last 2 days, will replace as needed       # Hypertension: Noted on problem list        # Obesity: Estimated body mass index is 31.07 kg/m  as calculated from the following:    Height as of this encounter: 1.626 m (5' 4\").    Weight as of this encounter: 82.1 kg (181 lb)., PRESENT ON ADMISSION  # Moderate Malnutrition: based on nutrition assessment, PRESENT ON ADMISSION     # Financial/Environmental Concerns: none         Disposition Plan     Medically Ready for Discharge: Likely home with HHC  vs TCU on/around Friday             ANETA Martinez  Hospitalist Service  [facility]  Securely message with [employer] (more info)  Text page via [employer] Paging/Directory   ______________________________________________________________________    Interval History   Patient is doing better. Eating and drinking better. KEE Peterson) was on the speaker phone during my visit. Minoo updated; answered her questions. Patient was very adamant about not going to TCU yesterday; today she is saying that she would consider TCU if she is still weak by tomorrow.     Physical Exam   Vital Signs: Temp: 97.5  F (36.4  C) " Temp src: Oral BP: 139/66 Pulse: 76   Resp: 18 SpO2: 99 % O2 Device: None (Room air)    Weight: 181 lbs 0 oz      General: Not in obvious distress.  HEENT: NC, AT   Chest: Clear to auscultation bilaterally  Heart: S1S2 normal, regular. No M/R/G  Abdomen: Soft. NT, ND. Bowel sounds- active.  Extremities: No legs swelling  Neuro: Alert and awake, grossly non-focal      Medical Decision Making             Data     I have personally reviewed the following data over the past 24 hrs:    N/A  \   N/A   / N/A     134 (L) 103 18.3 /  170 (H)   3.9 21 (L) 0.94 \       Imaging results reviewed over the past 24 hrs:   No results found for this or any previous visit (from the past 24 hour(s)).

## 2024-05-02 NOTE — PROGRESS NOTES
Care Management Follow Up    Length of Stay (days): 3    Expected Discharge Date: 05/03/2024     Concerns to be Addressed: discharge planning     Patient plan of care discussed at interdisciplinary rounds: Yes    Anticipated Discharge Disposition:  patient decided on TCU vs HC     Anticipated Discharge Services:  rehab  Anticipated Discharge DME:      Patient/family educated on Medicare website which has current facility and service quality ratings:  list given, [facility]  Education Provided on the Discharge Plan:  yes  Patient/Family in Agreement with the Plan:  yes    Referrals Placed by CM/SW:  on TCU lists  Private pay costs discussed: transportation costs    Additional Information:  Patient requests TCU and is on lists. Friend ,Minoo to transport. PAS needed.    KEVIN Keller

## 2024-05-02 NOTE — PLAN OF CARE
"  Problem: Adult Inpatient Plan of Care  Description: The Care Plan Review/Shift Note, Individualized Goals, Hospital-Acquired Illness or Injury, Comfort and Wellbeing, and Transition Planning are the \"Overarching Goals\" and should be updated throughout the hospitalization.  Please hover over the (i) for specific information on each goal topic.  Goal: Plan of Care Review  Description: The Plan of Care Review/Shift note should be completed every shift.  The Outcome Evaluation is a brief statement about your assessment that the patient is improving, declining, or no change.  This information will be displayed automatically on your shift  note.  Outcome: Progressing  Flowsheets (Taken 5/2/2024 7740)  Outcome Evaluation: Pt meeting 50% of nutrition needs with meals and 100% of needs with meals and supplements. Altered taste and early satiety barriers but improving  Plan of Care Reviewed With: patient  Overall Patient Progress: improving     Problem: Malnutrition  Goal: Improved Nutritional Intake  Outcome: Progressing   Goal Outcome Evaluation:      Plan of Care Reviewed With: patient    Overall Patient Progress: improvingOverall Patient Progress: improving    Outcome Evaluation: Pt meeting 50% of nutrition needs with meals and 100% of needs with meals and supplements. Altered taste and early satiety barriers but improving      "

## 2024-05-02 NOTE — PROGRESS NOTES
"CLINICAL NUTRITION SERVICES - ASSESSMENT NOTE     Nutrition Prescription    RECOMMENDATIONS FOR MDs/PROVIDERS TO ORDER:  Increase bowel regimen until pt has BM. Last BM 4/23    Malnutrition Status:    Moderate malnutrition  In Context of:  Chronic illness or disease    Recommendations already ordered by Registered Dietitian (RD):  New weight    Future/Additional Recommendations:  Adjust supplement pending intake, weight, tolerance, acceptance, labs       EVALUATION OF PROGRESS TOWARDS GOALS    CURRENT NUTRITION ORDERS  Diet: Moderate Consistent Carbohydrate  Supplement: Ensure enlive bid = 700 kcal, 40 g protein    Intake/Tolerance: Fair of meals, good with supplements  Pt eating 50-75% of meals.   Meeting 50% of estimated nutrition needs with meals alone  Pt reports taking both ensure daily    With supplements pt is meeting 100% of estimated nutrition needs    Pt reports continues with early satiety but is improved. Altered taste also continues. Her intake is improving but still inadequate of meals alone. She is willing to continue taking ensure      ANTHROPOMETRICS  Height: 162.6 cm (5' 4\")  Most Recent Weight: 82.1 kg (181 lb)  - stated weight? - new weight ordered but not taken    Dosing Weight: 61.4 kg adjusted wt    ASSESSED NUTRITION NEEDS  Estimated Energy Needs: 5939-5914 kcals/day (25 - 30 kcals/kg)  Justification: Maintenance  Estimated Protein Needs: 61+ grams protein/day (1+ grams of pro/kg)  Justification: Maintenance, GILSON  Estimated Fluid Needs: 7751-1825 mL/day (25 - 30 mL/kg)   Justification: Maintenance    GI  Last BM 4/23. Given prn senna doc this am    MALNUTRITION:  % Weight Loss:  > 10% in 6 months (severe malnutrition)  % Intake:  <75% for >/= 1 month (moderate malnutrition)  Subcutaneous Fat Loss:  None observed  Muscle Loss:  Temporal region mild depletion, Clavicle bone region mild/moderate depletion, and Acromion bone region mild depletion  Fluid Retention:  None noted    Malnutrition " Diagnosis: Moderate malnutrition  In Context of:  Chronic illness or disease    NUTRITION DIAGNOSIS  Malnutrition related to poor appetite, weakness as evidenced by inadequate oral intakes <75% >1 month, unintentional weight loss, muscle losses.   - improving    INTERVENTIONS  Implementation  Encouraged continued intake of ensure bid to meet nutrition needs    Goals  Pt to meet >75% nutritional needs - met  Electrolytes WNL - progressing  Normalize stooling -new    Monitoring/Evaluation  Progress toward goals will be monitored and evaluated per protocol.

## 2024-05-03 ENCOUNTER — LAB REQUISITION (OUTPATIENT)
Dept: LAB | Facility: CLINIC | Age: 69
End: 2024-05-03
Payer: COMMERCIAL

## 2024-05-03 ENCOUNTER — APPOINTMENT (OUTPATIENT)
Dept: PHYSICAL THERAPY | Facility: HOSPITAL | Age: 69
DRG: 682 | End: 2024-05-03
Payer: COMMERCIAL

## 2024-05-03 ENCOUNTER — MEDICAL CORRESPONDENCE (OUTPATIENT)
Dept: HEALTH INFORMATION MANAGEMENT | Facility: CLINIC | Age: 69
End: 2024-05-03

## 2024-05-03 VITALS
OXYGEN SATURATION: 98 % | HEIGHT: 64 IN | BODY MASS INDEX: 30.9 KG/M2 | HEART RATE: 80 BPM | WEIGHT: 181 LBS | TEMPERATURE: 98 F | RESPIRATION RATE: 16 BRPM | DIASTOLIC BLOOD PRESSURE: 64 MMHG | SYSTOLIC BLOOD PRESSURE: 132 MMHG

## 2024-05-03 DIAGNOSIS — Z11.1 ENCOUNTER FOR SCREENING FOR RESPIRATORY TUBERCULOSIS: ICD-10-CM

## 2024-05-03 LAB
ANION GAP SERPL CALCULATED.3IONS-SCNC: 9 MMOL/L (ref 7–15)
BUN SERPL-MCNC: 12.7 MG/DL (ref 8–23)
CALCIUM SERPL-MCNC: 9.5 MG/DL (ref 8.8–10.2)
CHLORIDE SERPL-SCNC: 105 MMOL/L (ref 98–107)
CREAT SERPL-MCNC: 0.85 MG/DL (ref 0.51–0.95)
DEPRECATED HCO3 PLAS-SCNC: 22 MMOL/L (ref 22–29)
EGFRCR SERPLBLD CKD-EPI 2021: 73 ML/MIN/1.73M2
GLUCOSE BLDC GLUCOMTR-MCNC: 111 MG/DL (ref 70–99)
GLUCOSE BLDC GLUCOMTR-MCNC: 154 MG/DL (ref 70–99)
GLUCOSE BLDC GLUCOMTR-MCNC: 204 MG/DL (ref 70–99)
GLUCOSE SERPL-MCNC: 131 MG/DL (ref 70–99)
HOLD SPECIMEN: NORMAL
MAGNESIUM SERPL-MCNC: 1.4 MG/DL (ref 1.7–2.3)
MAGNESIUM SERPL-MCNC: 2.5 MG/DL (ref 1.7–2.3)
POTASSIUM SERPL-SCNC: 3.8 MMOL/L (ref 3.4–5.3)
SODIUM SERPL-SCNC: 136 MMOL/L (ref 135–145)

## 2024-05-03 PROCEDURE — 36415 COLL VENOUS BLD VENIPUNCTURE: CPT | Performed by: INTERNAL MEDICINE

## 2024-05-03 PROCEDURE — 250N000011 HC RX IP 250 OP 636: Performed by: STUDENT IN AN ORGANIZED HEALTH CARE EDUCATION/TRAINING PROGRAM

## 2024-05-03 PROCEDURE — 97116 GAIT TRAINING THERAPY: CPT | Mod: GP | Performed by: PHYSICAL THERAPIST

## 2024-05-03 PROCEDURE — 250N000013 HC RX MED GY IP 250 OP 250 PS 637: Performed by: STUDENT IN AN ORGANIZED HEALTH CARE EDUCATION/TRAINING PROGRAM

## 2024-05-03 PROCEDURE — 83735 ASSAY OF MAGNESIUM: CPT | Performed by: INTERNAL MEDICINE

## 2024-05-03 PROCEDURE — 80048 BASIC METABOLIC PNL TOTAL CA: CPT | Performed by: INTERNAL MEDICINE

## 2024-05-03 PROCEDURE — 97530 THERAPEUTIC ACTIVITIES: CPT | Mod: GP | Performed by: PHYSICAL THERAPIST

## 2024-05-03 PROCEDURE — 250N000011 HC RX IP 250 OP 636: Performed by: INTERNAL MEDICINE

## 2024-05-03 PROCEDURE — 250N000013 HC RX MED GY IP 250 OP 250 PS 637: Performed by: INTERNAL MEDICINE

## 2024-05-03 PROCEDURE — 99239 HOSP IP/OBS DSCHRG MGMT >30: CPT | Performed by: INTERNAL MEDICINE

## 2024-05-03 RX ORDER — MAGNESIUM SULFATE 4 G/50ML
4 INJECTION INTRAVENOUS ONCE
Qty: 50 ML | Refills: 0 | Status: COMPLETED | OUTPATIENT
Start: 2024-05-03 | End: 2024-05-03

## 2024-05-03 RX ORDER — CEFDINIR 300 MG/1
300 CAPSULE ORAL 2 TIMES DAILY
Qty: 6 CAPSULE | Refills: 0 | Status: SHIPPED | OUTPATIENT
Start: 2024-05-03 | End: 2024-05-06

## 2024-05-03 RX ADMIN — CEFTRIAXONE SODIUM 1 G: 1 INJECTION, POWDER, FOR SOLUTION INTRAMUSCULAR; INTRAVENOUS at 08:25

## 2024-05-03 RX ADMIN — MAGNESIUM SULFATE HEPTAHYDRATE 4 G: 80 INJECTION, SOLUTION INTRAVENOUS at 09:37

## 2024-05-03 RX ADMIN — THERA TABS 1 TABLET: TAB at 08:25

## 2024-05-03 RX ADMIN — ASPIRIN 81 MG: 81 TABLET, COATED ORAL at 08:25

## 2024-05-03 RX ADMIN — TIMOLOL MALEATE 1 DROP: 5 SOLUTION/ DROPS OPHTHALMIC at 08:25

## 2024-05-03 RX ADMIN — PANTOPRAZOLE SODIUM 40 MG: 40 TABLET, DELAYED RELEASE ORAL at 06:17

## 2024-05-03 ASSESSMENT — ACTIVITIES OF DAILY LIVING (ADL)
ADLS_ACUITY_SCORE: 35
ADLS_ACUITY_SCORE: 35
ADLS_ACUITY_SCORE: 39
ADLS_ACUITY_SCORE: 39
ADLS_ACUITY_SCORE: 35
ADLS_ACUITY_SCORE: 39
ADLS_ACUITY_SCORE: 35
ADLS_ACUITY_SCORE: 39

## 2024-05-03 NOTE — PLAN OF CARE
"  Problem: Adult Inpatient Plan of Care  Goal: Plan of Care Review  Description: The Plan of Care Review/Shift note should be completed every shift.  The Outcome Evaluation is a brief statement about your assessment that the patient is improving, declining, or no change.  This information will be displayed automatically on your shift  note.  Outcome: Progressing  Goal: Patient-Specific Goal (Individualized)  Description: You can add care plan individualizations to a care plan. Examples of Individualization might be:  \"Parent requests to be called daily at 9am for status\", \"I have a hard time hearing out of my right ear\", or \"Do not touch me to wake me up as it startles  me\".  Outcome: Progressing  Goal: Absence of Hospital-Acquired Illness or Injury  Outcome: Progressing  Intervention: Identify and Manage Fall Risk  Recent Flowsheet Documentation  Taken 5/3/2024 0210 by Jhoana Aldana, RN  Safety Promotion/Fall Prevention: activity supervised  Intervention: Prevent Skin Injury  Recent Flowsheet Documentation  Taken 5/3/2024 0210 by Jhoana Aldana, RN  Body Position: position changed independently  Goal: Optimal Comfort and Wellbeing  Outcome: Progressing  Goal: Readiness for Transition of Care  Outcome: Progressing   Goal Outcome Evaluation:       Pt denies pain and nausea. Vss. Bg 154. Pt slept well. Iv leaking. New iv placed. Iv Rocephin still ordered.                 "

## 2024-05-03 NOTE — PLAN OF CARE
Problem: Adult Inpatient Plan of Care  Goal: Absence of Hospital-Acquired Illness or Injury  Outcome: Progressing  Intervention: Identify and Manage Fall Risk  Recent Flowsheet Documentation  Taken 5/2/2024 1700 by Boris Galo RN  Safety Promotion/Fall Prevention:   activity supervised   assistive device/personal items within reach   clutter free environment maintained   lighting adjusted   nonskid shoes/slippers when out of bed   patient and family education   safety round/check completed   supervised activity     Problem: Adult Inpatient Plan of Care  Goal: Optimal Comfort and Wellbeing  Outcome: Progressing     Problem: Risk for Delirium  Goal: Optimal Coping  Outcome: Progressing  Goal: Improved Behavioral Control  Outcome: Progressing  Intervention: Minimize Safety Risk  Recent Flowsheet Documentation  Taken 5/2/2024 1700 by Boris Galo RN  Enhanced Safety Measures:   assistive devices when indicated   pain management  Goal: Improved Attention and Thought Clarity  Outcome: Progressing  Goal: Improved Sleep  Outcome: Progressing     Problem: Skin Injury Risk Increased  Goal: Skin Health and Integrity  Outcome: Progressing  Intervention: Plan: Nurse Driven Intervention: Moisture Management  Recent Flowsheet Documentation  Taken 5/2/2024 1700 by Boris Galo RN  Moisture Interventions: Encourage regular toileting  Intervention: Plan: Nurse Driven Intervention: Friction and Shear  Recent Flowsheet Documentation  Taken 5/2/2024 1700 by Boris Galo RN  Friction/Shear Interventions: HOB 30 degrees or less     Problem: Fall Injury Risk  Goal: Absence of Fall and Fall-Related Injury  Outcome: Progressing  Intervention: Promote Injury-Free Environment  Recent Flowsheet Documentation  Taken 5/2/2024 1700 by Boris Galo RN  Safety Promotion/Fall Prevention:   activity supervised   assistive device/personal items within reach   clutter free environment maintained   lighting adjusted   nonskid shoes/slippers  when out of bed   patient and family education   safety round/check completed   supervised activity     Problem: Comorbidity Management  Goal: Blood Glucose Levels Within Targeted Range  Outcome: Progressing     Problem: UTI (Urinary Tract Infection)  Goal: Improved Infection Symptoms  Outcome: Progressing     Problem: Malnutrition  Goal: Improved Nutritional Intake  Outcome: Not Progressing     Pt alert and oriented. Denied pain at this time. Pre-prandial BG of 148 and pt refused insulin per sliding scale, even after pt education. HS BG of 184. Pt stated she had no BM yet, but that she is starting to pass flatus. Trace edema observed to BLE. Pt w/ poor appetite; she ate 25% of her dinner.

## 2024-05-03 NOTE — PLAN OF CARE
Physical Therapy Discharge Summary    Reason for therapy discharge:    Discharged to transitional care facility.    Progress towards therapy goal(s). See goals on Care Plan in [facility] electronic health record for goal details.  Goals partially met.  Barriers to achieving goals:   discharge from facility.    Therapy recommendation(s):    Continued therapy is recommended.  Rationale/Recommendations:  recommend continued PT at TCU.    Daysi Bliss, PT  5/3/2024

## 2024-05-03 NOTE — PLAN OF CARE
Occupational Therapy Discharge Summary    Reason for therapy discharge:    Discharged to transitional care facility.    Progress towards therapy goal(s). See goals on Care Plan in [facility] electronic health record for goal details.  Goals partially met.  Barriers to achieving goals:   discharge from facility.    Therapy recommendation(s):    Continued therapy is recommended.  Rationale/Recommendations:  to maximize ADL independence.

## 2024-05-03 NOTE — PLAN OF CARE
"  Problem: Adult Inpatient Plan of Care  Goal: Patient-Specific Goal (Individualized)  Description: You can add care plan individualizations to a care plan. Examples of Individualization might be:  \"Parent requests to be called daily at 9am for status\", \"I have a hard time hearing out of my right ear\", or \"Do not touch me to wake me up as it startles  me\".  Outcome: Met     Problem: Adult Inpatient Plan of Care  Goal: Absence of Hospital-Acquired Illness or Injury  Outcome: Met     Problem: Adult Inpatient Plan of Care  Goal: Absence of Hospital-Acquired Illness or Injury  Intervention: Identify and Manage Fall Risk  Recent Flowsheet Documentation  Taken 5/3/2024 0800 by Boris Yeung RN  Safety Promotion/Fall Prevention: activity supervised   Goal Outcome Evaluation:  No verbal or nonverbal expressions of pain, ambulating in hallway. No BM, refused bowel meds until she gets to the TCU. Patient will discharge at 1500 to a TCU, friend will transport.                      "

## 2024-05-03 NOTE — PROGRESS NOTES
Care Management Discharge Note    Discharge Date: 05/03/2024       Discharge Disposition: Transitional Care    Discharge Services: None    Discharge DME:  (per treatment team)    Discharge Transportation: family or friend will provide    Private pay costs discussed: Not applicable    Does the patient's insurance plan have a 3 day qualifying hospital stay waiver?  Yes     Which insurance plan 3 day waiver is available? Alternative insurance waiver    Will the waiver be used for post-acute placement? No    PAS Confirmation Code: [id number]  Patient/family educated on Medicare website which has current facility and service quality ratings: yes    Education Provided on the Discharge Plan: Yes (per treatment team)  Persons Notified of Discharge Plans: patient   Patient/Family in Agreement with the Plan: yes    Handoff Referral Completed: Yes    Additional Information:  Patient accepted for TCU placement at [facility]. SW met with patient. Patient reports agreement with discharge to [facility]; friend [facility] will transport around 1500.  PAS completed and on chart. Updated MD and RN.     KEVIN Nguyen

## 2024-05-03 NOTE — DISCHARGE SUMMARY
[facility] MEDICINE  DISCHARGE SUMMARY     Primary Care Physician: Shelbi Little  Admission Date: 4/29/2024   Discharge Provider: ANETA Martinez Discharge Date: 5/3/2024   Diet: as below   Code Status: No CPR- Pre-arrest intubation OK   Activity: DCACTIVITY: Activity as tolerated        Condition at Discharge: Stable     REASON FOR PRESENTATION(See Admission Note for Details)   Fall, weakness    PRINCIPAL & ACTIVE DISCHARGE DIAGNOSES     Active Problems:    Hypomagnesemia    Hyponatremia    Falls frequently    GILSON (acute kidney injury) (H24)      PENDING LABS     Unresulted Labs Ordered in the Past 30 Days of this Admission       Date and Time Order Name Status Description    4/29/2024  2:53 PM Blood Culture Arm, Right Preliminary               PROCEDURES ( this hospitalization only)          RECOMMENDATIONS TO OUTPATIENT PROVIDER FOR F/U VISIT     Follow-up Appointments     Follow Up and recommended labs and tests      Follow up with Nursing home physician.                DISPOSITION     Skilled Nursing Facility    SUMMARY OF HOSPITAL COURSE:      Gabi Sandhu is a 69 year old female admitted on 4/29/2024. She has a h/o HLD, DM presented with recurrent falls and generalized weakness for several weeks. Today, pt felt lightheaded, legs gave out, and fell on her back. Denied hitting head, loss of consciousness, chest pain, shortness of breath. Pt last ate 3 days ago stating she doesn't feel hungry. Per ED, pt BP was 80/50. In ED, BP was borderline, received NS bolus. Labs showed hyponatremia, elevated creatinine, hypomagnesemia, RBS: 145, ketone 2.7. Please review the admission H&P for details. In summary,       S/p fall, recurrent  Generalized weakness  -- Fall precaution  -- PT/OT consulted  -- Ck: 79  -- CT-head: negative for acute intracranial process     GILSON  -- Likely 2/2 dehydration. Better with hydration. Stopped IVF. Baseline creatinine is about 0.8.  Presented with creatinine of 2.3. Resolved now  -- Renal US- hydronephrosis ruled out  -- Held ACEI. Resumed upon discharge     AG metabolic acidosis  Ketonemia  -- Likely 2/2 dehydration  -- Ketosis likely 2/2 starvation  given RBS: 145 and pt not on SGL2I  -- ABG. Ph: 7.36.     Elevated d-dimer  -- No shortness of breath.  Saturating well on RA  -- Left leg seems larger than left. No tenderness.  -- US-duplex- LE negative for DVT     Acute cystitis without hematuria  -- Urine culture- E coli. Treated with IV ceftriaxone while here. Discharging on PO cefdinir for next few days  -- B/C reported to be positive for staph species (1/2 bottles). Suspect this is contamination. Procal- normal. Checked nasal swab for MRSA- negative.      Hyponatremia  -- Due to dehydration. Improved from 128-133     Hypomagnesemia  -- S/p mag sulfate 2 gm in ED  -- Monitor Mg level in am labs     Elevated troponin  -- Troponins flat. No chest pain. EKG: non specific ST waves in lateral leads  -- Likely 2/2 demand ischemia (type II MI)     DM-II  -- Not on meds  -- RBS: 145  -- A1c- 5.6  -- Accu-checks, moderate sliding scale     Essential hypertension  -- Held lisinopril due to GILSON. Resumed now.  -- Monitor vital signs per protocol  -- Hydralazin iv prn     HLD  -- C/w PTA lipitor.      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  -- ED consulted orthopedics: no intervention     Glaucoma  -- C/w PTA timoptic     Decreased po intake  -- RD consulted    Discharge Medications with Med changes:     Discharge Medication List as of 5/3/2024  2:51 PM        START taking these medications    Details   cefdinir (OMNICEF) 300 MG capsule Take 1 capsule (300 mg) by mouth 2 times daily for 3 days, Disp-6 capsule, R-0, E-Prescribe           CONTINUE these medications which have NOT CHANGED    Details   aspirin 81 MG EC tablet [ASPIRIN 81 MG EC TABLET] Take 81 mg by mouth once daily., Historical      atorvastatin (LIPITOR) 40 MG  tablet Take 1 tablet (40 mg) by mouth daily, Disp-90 tablet, R-1, E-Prescribe      cholecalciferol, vitamin D3, 2,000 unit Tab [CHOLECALCIFEROL, VITAMIN D3, 2,000 UNIT TAB] Take 1 tablet by mouth once daily., Historical      lisinopril (ZESTRIL) 20 MG tablet [LISINOPRIL (PRINIVIL,ZESTRIL) 20 MG TABLET] TAKE 1 TABLET BY MOUTH EVERY DAY, Disp-90 tablet, R-1, E-Prescribe      Omega-3 Fatty Acids (FISH OIL) 1200 MG capsule Take 1,200 mg by mouth daily, Historical      omeprazole (PRILOSEC) 20 MG DR capsule TAKE 1 CAPSULE (20 MG TOTAL) BY MOUTH DAILY BEFORE BREAKFAST., Disp-90 capsule, R-2, E-Prescribe      timolol maleate (TIMOPTIC) 0.5 % ophthalmic solution [TIMOLOL MALEATE (TIMOPTIC) 0.5 % OPHTHALMIC SOLUTION] APPLY 1 DROP IN BOTH EYES ONCE IN THE MORNING, R-3, Historical      blood sugar diagnostic (ONETOUCH VERIO) Strp [BLOOD SUGAR DIAGNOSTIC (ONETOUCH VERIO) STRP] Apply 1 each topically 2 (two) times a day., Historical                   Rationale for medication changes:      Please see above        Consults   None      Immunizations given this encounter     Most Recent Immunizations   Administered Date(s) Administered     COVID-19 12+ (2023-24) (Pfizer) 10/26/2023     COVID-19 Bivalent 12+ (Pfizer) 05/02/2023     COVID-19 MONOVALENT 12+ (Pfizer) 11/15/2021     COVID-19 Monovalent 12+ (Pfizer 2022) 06/14/2022     Influenza (High Dose) 3 valent vaccine 11/19/2019     Influenza (IIV3) PF 10/11/2014     Influenza Vaccine 65+ (Fluzone HD) 10/26/2023     Influenza Vaccine >6 months,quad, PF 11/15/2021     Influenza Vaccine, 6+MO IM (QUADRIVALENT W/PRESERVATIVES) 11/11/2016     Pneumococcal 20 valent Conjugate (Prevnar 20) 10/26/2023     Pneumococcal 23 valent 06/14/2022     TD,PF 7+ (Tenivac) 09/29/2004     TDAP (Adacel,Boostrix) 11/11/2016     Td,adult,historic,unspecified 09/29/2004     Zoster recombinant adjuvanted (SHINGRIX) 11/20/2018           Anticoagulation Information      Recent INR results: No results for  "input(s): \"INR\" in the last 168 hours.  Warfarin doses (if applicable) or name of other anticoagulant: NA      SIGNIFICANT IMAGING FINDINGS     Results for orders placed or performed during the hospital encounter of 04/29/24   Head CT w/o contrast    Impression    IMPRESSION:  1.  No CT evidence for acute intracranial process.  2.  Brain atrophy and presumed chronic microvascular ischemic changes as above.   CT Pelvis Bone wo Contrast    Impression    IMPRESSION:  1.  No acute fracture.  2.  Avascular necrosis of the femoral heads bilaterally without articular surface collapse.  3.  Mild degenerative arthrosis both hips.   US Renal Complete Non-Vascular    Impression    IMPRESSION: Normal study.   US Lower Extremity Venous Duplex Bilateral    Impression    IMPRESSION:  1.  No deep venous thrombosis in the bilateral lower extremities.       SIGNIFICANT LABORATORY FINDINGS     Most Recent 3 CBC's:  Recent Labs   Lab Test 04/30/24  0718 04/29/24  1359 10/26/23  0843   WBC 7.2 6.9 8.3   HGB 11.8 12.8 14.3   MCV 93 93 96    182 235     Most Recent 3 BMP's:  Recent Labs   Lab Test 05/03/24  1143 05/03/24  0754 05/03/24  0556 05/02/24  0801 05/02/24  0745 05/01/24  0751 05/01/24  0525   NA  --   --  136  --  134*  --  133*   POTASSIUM  --   --  3.8  --  3.9  --  4.0   CHLORIDE  --   --  105  --  103  --  103   CO2  --   --  22  --  21*  --  19*   BUN  --   --  12.7  --  18.3  --  21.5   CR  --   --  0.85  --  0.94  --  1.20*   ANIONGAP  --   --  9  --  10  --  11   KAR  --   --  9.5  --  9.3  --  9.4   * 111* 131*   < > 123*   < > 112*    < > = values in this interval not displayed.     Most Recent 2 LFT's:  Recent Labs   Lab Test 06/14/22  1243 01/27/22  1235   AST 38 119*   ALT 28 57*   ALKPHOS 97 95   BILITOTAL 0.7 0.9     Most Recent 3 INR's:No lab results found.      Discharge Orders        Primary Care - Care Coordination Referral      General info for SNF    Length of Stay Estimate: Short Term Care: " "Estimated # of Days <30  Condition at Discharge: Improving  Level of care:skilled   Rehabilitation Potential: Good  Admission H&P remains valid and up-to-date: Yes  Recent Chemotherapy: N/A  Use Nursing Home Standing Orders: Yes     Mantoux instructions    Give two-step Mantoux (PPD) Per Facility Policy Yes     Follow Up and recommended labs and tests    Follow up with Nursing home physician.     Reason for your hospital stay    UTI, Acute renal failure     Activity - Up ad lauren     No CPR- Pre-arrest intubation OK     Physical Therapy Adult Consult    Evaluate and treat as clinically indicated.    Reason:  Weakness     Occupational Therapy Adult Consult    Evaluate and treat as clinically indicated.    Reason:  Weakness     Diet    Follow this diet upon discharge: Orders Placed This Encounter      Snacks/Supplements Adult: Ensure Enlive; With Meals      Combination Diet Moderate Consistent Carb (60 g CHO per Meal) Diet       Examination   /64 (BP Location: Left arm)   Pulse 80   Temp 98  F (36.7  C) (Oral)   Resp 16   Ht 1.626 m (5' 4\")   Wt 82.1 kg (181 lb)   LMP  (LMP Unknown)   SpO2 98%   BMI 31.07 kg/m    General: Not in obvious distress.  HEENT: NC, AT   Chest: Clear to auscultation bilaterally  Heart: S1S2 normal, regular. No M/R/G  Abdomen: Soft. NT, ND. Bowel sounds- active.  Extremities: No legs swelling  Neuro: Alert and awake, grossly non-focal    Please see EMR for more detailed significant labs, imaging, consultant notes etc.    IBeny MBBS, personally saw the patient today and spent greater than 30 minutes discharging this patient.    ANETA Martinez  [facility]    CC:Shelbi Little    "

## 2024-05-04 LAB — BACTERIA BLD CULT: NO GROWTH

## 2024-05-06 ENCOUNTER — PATIENT OUTREACH (OUTPATIENT)
Dept: CARE COORDINATION | Facility: CLINIC | Age: 69
End: 2024-05-06
Payer: COMMERCIAL

## 2024-05-06 PROCEDURE — 86481 TB AG RESPONSE T-CELL SUSP: CPT | Performed by: FAMILY MEDICINE

## 2024-05-06 PROCEDURE — 36415 COLL VENOUS BLD VENIPUNCTURE: CPT | Performed by: FAMILY MEDICINE

## 2024-05-06 PROCEDURE — P9603 ONE-WAY ALLOW PRORATED MILES: HCPCS | Performed by: FAMILY MEDICINE

## 2024-05-06 NOTE — LETTER
[facility]       To:  Eva Crespo TCU SW            Please give to facility      From:   Kate Begum [facility]  Care Coordinator   [facility]   P: [phone]  [email or web address]        Patient Name:    Gabi Sandhu   YOB: 1955   Admit date:   5-3-2024        Information Needed:  Please contact me when the patient will discharge (or if they will move to long term care)- include the discharge date, disposition, and main diagnosis       If the patient is discharged with home care services, please provide the name of the agency    Also- Please inform me if a care conference is being held.     Phone or Email with information                              Thank you

## 2024-05-06 NOTE — PROGRESS NOTES
Clinic Care Coordination Contact  Care Coordination Transition Communication    Clinical Data: Patient was hospitalized at           [facility] MEDICINE  DISCHARGE SUMMARY      Primary Care Physician: Shelbi Little                                                                  Admission Date: 4/29/2024   Discharge Provider: ANETA Martinez Discharge Date: 5/3/2024   Diet: as below    Code Status: No CPR- Pre-arrest intubation OK   Activity: DCACTIVITY: Activity as tolerated           Condition at Discharge: Stable     REASON FOR PRESENTATION(See Admission Note for Details)   Fall, weakness     PRINCIPAL & ACTIVE DISCHARGE DIAGNOSES      Active Problems:    Hypomagnesemia    Hyponatremia    Falls frequently    GILSON (acute kidney injury) (H24)        Assessment: Patient has transitioned to TCU/ARU for short term rehabilitation:    Facility Name: [facility]  Transition Communication:  Notified facility of Primary Care- Care Coordination support via Epic fax.    Plan: Care Coordinator will await notification from facility staff informing of patient's discharge plans/needs. Care Coordinator will review chart and outreach to facility staff every 4 weeks and as needed.     Kate Begum,   [facility]  [phone]

## 2024-05-07 ENCOUNTER — MEDICAL CORRESPONDENCE (OUTPATIENT)
Dept: HEALTH INFORMATION MANAGEMENT | Facility: CLINIC | Age: 69
End: 2024-05-07

## 2024-05-07 LAB
GAMMA INTERFERON BACKGROUND BLD IA-ACNC: 0.01 IU/ML
M TB IFN-G BLD-IMP: NEGATIVE
M TB IFN-G CD4+ BCKGRND COR BLD-ACNC: 9.99 IU/ML
MITOGEN IGNF BCKGRD COR BLD-ACNC: 0 IU/ML
MITOGEN IGNF BCKGRD COR BLD-ACNC: 0.02 IU/ML
QUANTIFERON MITOGEN: 10 IU/ML
QUANTIFERON NIL TUBE: 0.01 IU/ML
QUANTIFERON TB1 TUBE: 0.03 IU/ML
QUANTIFERON TB2 TUBE: 0.01

## 2024-05-08 ENCOUNTER — PATIENT OUTREACH (OUTPATIENT)
Dept: CARE COORDINATION | Facility: CLINIC | Age: 69
End: 2024-05-08
Payer: COMMERCIAL

## 2024-05-08 ENCOUNTER — LAB REQUISITION (OUTPATIENT)
Dept: LAB | Facility: CLINIC | Age: 69
End: 2024-05-08
Payer: COMMERCIAL

## 2024-05-08 DIAGNOSIS — R39.15 URGENCY OF URINATION: ICD-10-CM

## 2024-05-08 DIAGNOSIS — E11.9 TYPE 2 DIABETES MELLITUS WITHOUT COMPLICATIONS (H): ICD-10-CM

## 2024-05-08 DIAGNOSIS — I10 ESSENTIAL (PRIMARY) HYPERTENSION: ICD-10-CM

## 2024-05-08 DIAGNOSIS — N30.00 ACUTE CYSTITIS WITHOUT HEMATURIA: ICD-10-CM

## 2024-05-08 LAB
ALBUMIN UR-MCNC: NEGATIVE MG/DL
APPEARANCE UR: CLEAR
BACTERIA #/AREA URNS HPF: ABNORMAL /HPF
BILIRUB UR QL STRIP: NEGATIVE
COLOR UR AUTO: ABNORMAL
GLUCOSE UR STRIP-MCNC: 300 MG/DL
HGB UR QL STRIP: NEGATIVE
HYALINE CASTS: 1 /LPF
KETONES UR STRIP-MCNC: NEGATIVE MG/DL
LEUKOCYTE ESTERASE UR QL STRIP: NEGATIVE
NITRATE UR QL: NEGATIVE
PH UR STRIP: 5.5 [PH] (ref 5–7)
RBC URINE: <1 /HPF
SP GR UR STRIP: 1.01 (ref 1–1.03)
SQUAMOUS EPITHELIAL: 1 /HPF
TRANSITIONAL EPI: <1 /HPF
UROBILINOGEN UR STRIP-MCNC: NORMAL MG/DL
WBC URINE: 4 /HPF

## 2024-05-08 PROCEDURE — 87086 URINE CULTURE/COLONY COUNT: CPT | Performed by: NURSE PRACTITIONER

## 2024-05-08 PROCEDURE — 81001 URINALYSIS AUTO W/SCOPE: CPT | Performed by: NURSE PRACTITIONER

## 2024-05-09 LAB
ANION GAP SERPL CALCULATED.3IONS-SCNC: 10 MMOL/L (ref 7–15)
BACTERIA UR CULT: NO GROWTH
BUN SERPL-MCNC: 15.5 MG/DL (ref 8–23)
CALCIUM SERPL-MCNC: 9.1 MG/DL (ref 8.8–10.2)
CHLORIDE SERPL-SCNC: 100 MMOL/L (ref 98–107)
CREAT SERPL-MCNC: 0.95 MG/DL (ref 0.51–0.95)
DEPRECATED HCO3 PLAS-SCNC: 24 MMOL/L (ref 22–29)
EGFRCR SERPLBLD CKD-EPI 2021: 64 ML/MIN/1.73M2
ERYTHROCYTE [DISTWIDTH] IN BLOOD BY AUTOMATED COUNT: 14.3 % (ref 10–15)
GLUCOSE SERPL-MCNC: 120 MG/DL (ref 70–99)
HCT VFR BLD AUTO: 28.4 % (ref 35–47)
HGB BLD-MCNC: 9.8 G/DL (ref 11.7–15.7)
MCH RBC QN AUTO: 33.4 PG (ref 26.5–33)
MCHC RBC AUTO-ENTMCNC: 34.5 G/DL (ref 31.5–36.5)
MCV RBC AUTO: 97 FL (ref 78–100)
PLATELET # BLD AUTO: 236 10E3/UL (ref 150–450)
POTASSIUM SERPL-SCNC: 4.5 MMOL/L (ref 3.4–5.3)
RBC # BLD AUTO: 2.93 10E6/UL (ref 3.8–5.2)
SODIUM SERPL-SCNC: 134 MMOL/L (ref 135–145)
WBC # BLD AUTO: 9.3 10E3/UL (ref 4–11)

## 2024-05-09 PROCEDURE — 80048 BASIC METABOLIC PNL TOTAL CA: CPT | Performed by: NURSE PRACTITIONER

## 2024-05-09 PROCEDURE — 36415 COLL VENOUS BLD VENIPUNCTURE: CPT | Performed by: NURSE PRACTITIONER

## 2024-05-09 PROCEDURE — 85027 COMPLETE CBC AUTOMATED: CPT | Performed by: NURSE PRACTITIONER

## 2024-05-09 PROCEDURE — P9603 ONE-WAY ALLOW PRORATED MILES: HCPCS | Performed by: NURSE PRACTITIONER

## 2024-05-14 ENCOUNTER — PATIENT OUTREACH (OUTPATIENT)
Dept: CARE COORDINATION | Facility: CLINIC | Age: 69
End: 2024-05-14

## 2024-05-14 ENCOUNTER — OFFICE VISIT (OUTPATIENT)
Dept: FAMILY MEDICINE | Facility: CLINIC | Age: 69
End: 2024-05-14
Payer: COMMERCIAL

## 2024-05-14 VITALS
OXYGEN SATURATION: 99 % | RESPIRATION RATE: 15 BRPM | SYSTOLIC BLOOD PRESSURE: 127 MMHG | HEART RATE: 72 BPM | BODY MASS INDEX: 32.61 KG/M2 | WEIGHT: 191 LBS | HEIGHT: 64 IN | TEMPERATURE: 97.5 F | DIASTOLIC BLOOD PRESSURE: 68 MMHG

## 2024-05-14 DIAGNOSIS — Z01.818 PREOP GENERAL PHYSICAL EXAM: Primary | ICD-10-CM

## 2024-05-14 DIAGNOSIS — H26.9 CATARACT OF BOTH EYES, UNSPECIFIED CATARACT TYPE: ICD-10-CM

## 2024-05-14 DIAGNOSIS — E78.5 HYPERLIPIDEMIA, UNSPECIFIED HYPERLIPIDEMIA TYPE: ICD-10-CM

## 2024-05-14 DIAGNOSIS — E11.65 TYPE 2 DIABETES MELLITUS WITH HYPERGLYCEMIA, WITHOUT LONG-TERM CURRENT USE OF INSULIN (H): ICD-10-CM

## 2024-05-14 DIAGNOSIS — E83.42 HYPOMAGNESEMIA: ICD-10-CM

## 2024-05-14 DIAGNOSIS — E66.01 MORBID OBESITY (H): ICD-10-CM

## 2024-05-14 DIAGNOSIS — I10 BENIGN ESSENTIAL HYPERTENSION: ICD-10-CM

## 2024-05-14 DIAGNOSIS — K21.9 GASTROESOPHAGEAL REFLUX DISEASE WITHOUT ESOPHAGITIS: ICD-10-CM

## 2024-05-14 PROCEDURE — 99214 OFFICE O/P EST MOD 30 MIN: CPT

## 2024-05-14 RX ORDER — RESPIRATORY SYNCYTIAL VIRUS VACCINE 120MCG/0.5
0.5 KIT INTRAMUSCULAR ONCE
Qty: 1 EACH | Refills: 0 | Status: CANCELLED | OUTPATIENT
Start: 2024-05-14 | End: 2024-05-14

## 2024-05-14 RX ORDER — TIMOLOL MALEATE 5 MG/ML
SOLUTION/ DROPS OPHTHALMIC
Refills: 3 | Status: CANCELLED | OUTPATIENT
Start: 2024-05-14

## 2024-05-14 RX ORDER — ATORVASTATIN CALCIUM 40 MG/1
40 TABLET, FILM COATED ORAL DAILY
Qty: 90 TABLET | Refills: 0 | Status: SHIPPED | OUTPATIENT
Start: 2024-05-14 | End: 2024-07-22

## 2024-05-14 RX ORDER — LISINOPRIL 20 MG/1
TABLET ORAL
Qty: 90 TABLET | Refills: 0 | Status: SHIPPED | OUTPATIENT
Start: 2024-05-14 | End: 2024-07-22

## 2024-05-14 ASSESSMENT — PAIN SCALES - GENERAL: PAINLEVEL: NO PAIN (0)

## 2024-05-14 NOTE — PROGRESS NOTES
Preoperative Evaluation  [facility]  [address] [place] [address]  Phone: [phone]  Fax: [phone]  Primary Provider: Shelbi Little  Pre-op Performing Provider: JOEY BOURGEOIS  May 14, 2024     Gabi is a 69 year old, presenting for the following:  Recheck Medication and Pre-Op Exam (Right Cataract surgery on 5/22 , Left on 6/5 at [facility] with Luke Edwards)        5/14/2024     9:21 AM   Additional Questions   Roomed by ani streeter     Surgical Information  Surgery/Procedure: Cataract  Surgery Location: [facility]  Surgeon: Dr. Edwards  Surgery Date: right 5/22, left 6/5  Time of Surgery: TBD  Where patient plans to recover: At home with family  Fax number for surgical facility: [phone]    Assessment & Plan     The proposed surgical procedure is considered LOW risk.    Preop general physical exam  Preoperative exam completed today. Chronic conditions stable. Cardiac risk very low. Recently hospitalized with hypomagnesemia, urinary tract infection, GILSON, falls.  Reviewed recent labs and discharge summary, she is now stable and feeling better.     Cataract of both eyes, unspecified cataract type  Surgery planned for 5/22 and 6/5    Benign essential hypertension  Stable. Short refill provided today. Due for AWV. She will make an appointment with her PCP.   - lisinopril (ZESTRIL) 20 MG tablet  Dispense: 90 tablet; Refill: 0    Gastroesophageal reflux disease without esophagitis  Stable. Short refill provided today. Due for AWV.  - omeprazole (PRILOSEC) 20 MG DR capsule  Dispense: 90 capsule; Refill: 0    Hyperlipidemia, unspecified hyperlipidemia type  Will at times forget to take her evening medication. Short refill provided today. Due for AWV.  - atorvastatin (LIPITOR) 40 MG tablet  Dispense: 90 tablet; Refill: 0    Type II diabetes mellitus with hyperglycemia, without long-term current use of insulin (H)  Last A1c 5.6. Diet and exercise  controlled.    Hypomagnesemia  Stable. Replaced during recent hospitalization.     Morbid Obesity (H)  Working on diet and increasing physical activity.    Plan to follow up with her PCP for AWV.     MED REC REQUIRED  Post Medication Reconciliation Status: discharge medications reconciled, continue medications without change       Risks and Recommendations  The patient has the following additional risks and recommendations for perioperative complications:  Diabetes:  - Patient is not on insulin therapy: regular NPO guidelines can be followed.     Antiplatelet or Anticoagulation Medication Instructions   - aspirin: Discontinue aspirin 7-10 days prior to procedure to reduce bleeding risk. It should be resumed postoperatively.     Additional Medication Instructions   - ACE/ARB: HOLD on day of surgery (minimum 11 hours for general anesthesia).   - Statins: Continue taking on the day of surgery.    - Herbal medications and vitamins: HOLD 14 days prior to surgery.    Recommendation  APPROVAL GIVEN to proceed with proposed procedure, without further diagnostic evaluation.        Subjective       HPI related to upcoming procedure: Right cataract worse than left. Been affecting her depth perception.         5/7/2024    12:51 PM   Preop Questions   1. Have you ever had a heart attack or stroke? No   2. Have you ever had surgery on your heart or blood vessels, such as a stent placement, a coronary artery bypass, or surgery on an artery in your head, neck, heart, or legs? No   3. Do you have chest pain with activity? No   4. Do you have a history of  heart failure? No   5. Do you currently have a cold, bronchitis or symptoms of other infection? No   6. Do you have a cough, shortness of breath, or wheezing? No   7. Do you or anyone in your family have previous history of blood clots? No   8. Do you or does anyone in your family have a serious bleeding problem such as prolonged bleeding following surgeries or cuts? No   9. Have  you ever had problems with anemia or been told to take iron pills? No   10. Have you had any abnormal blood loss such as black, tarry or bloody stools, or abnormal vaginal bleeding? No   11. Have you ever had a blood transfusion? No   12. Are you willing to have a blood transfusion if it is medically needed before, during, or after your surgery? Yes   13. Have you or any of your relatives ever had problems with anesthesia? No   14. Do you have sleep apnea, excessive snoring or daytime drowsiness? No   15. Do you have any artifical heart valves or other implanted medical devices like a pacemaker, defibrillator, or continuous glucose monitor? No   16. Do you have artificial joints? No   17. Are you allergic to latex? No       Health Care Directive  Patient has a Health Care Directive on file      Preoperative Review of    reviewed - no record of controlled substances prescribed.        Patient Active Problem List    Diagnosis Date Noted    Hypomagnesemia 04/29/2024     Priority: Medium    Hyponatremia 04/29/2024     Priority: Medium    Falls frequently 04/29/2024     Priority: Medium    GILSON (acute kidney injury) (H24) 04/29/2024     Priority: Medium    Positive colorectal cancer screening using Cologuard test 05/02/2023     Priority: Medium    Morbid obesity (H) 01/27/2022     Priority: Medium    Sudden hearing loss, left 08/01/2018     Priority: Medium    Benign essential hypertension 11/11/2016     Priority: Medium    Hyperlipidemia, unspecified hyperlipidemia type 11/11/2016     Priority: Medium    Type II diabetes mellitus (H) 11/11/2016     Priority: Medium    Acid reflux 03/26/2014     Priority: Medium      No past medical history on file.  Past Surgical History:   Procedure Laterality Date    CHOLECYSTECTOMY      HYSTERECTOMY  1998    for dysfunctional uterine bleeding, no cancer    OOPHORECTOMY Bilateral 1998    OVARIAN CYST SURGERY      TONSILLECTOMY       Current Outpatient Medications   Medication Sig  Dispense Refill    aspirin 81 MG EC tablet [ASPIRIN 81 MG EC TABLET] Take 81 mg by mouth once daily.      atorvastatin (LIPITOR) 40 MG tablet Take 1 tablet (40 mg) by mouth daily 90 tablet 1    blood sugar diagnostic (ONETOUCH VERIO) Strp [BLOOD SUGAR DIAGNOSTIC (ONETOUCH VERIO) STRP] Apply 1 each topically 2 (two) times a day.      cholecalciferol, vitamin D3, 2,000 unit Tab [CHOLECALCIFEROL, VITAMIN D3, 2,000 UNIT TAB] Take 1 tablet by mouth once daily.      lisinopril (ZESTRIL) 20 MG tablet [LISINOPRIL (PRINIVIL,ZESTRIL) 20 MG TABLET] TAKE 1 TABLET BY MOUTH EVERY DAY 90 tablet 1    Omega-3 Fatty Acids (FISH OIL) 1200 MG capsule Take 1,200 mg by mouth daily      omeprazole (PRILOSEC) 20 MG DR capsule TAKE 1 CAPSULE (20 MG TOTAL) BY MOUTH DAILY BEFORE BREAKFAST. 90 capsule 2    timolol maleate (TIMOPTIC) 0.5 % ophthalmic solution [TIMOLOL MALEATE (TIMOPTIC) 0.5 % OPHTHALMIC SOLUTION] APPLY 1 DROP IN BOTH EYES ONCE IN THE MORNING  3       No Known Allergies     Social History     Tobacco Use    Smoking status: Never    Smokeless tobacco: Never   Substance Use Topics    Alcohol use: Yes     Alcohol/week: 12.0 - 15.0 standard drinks of alcohol     Comment: 6-7 per week     Family History   Problem Relation Age of Onset    Diabetes Mother     Thyroid Cancer Mother     Heart Disease Father         CHF    Diabetes Brother     Breast Cancer Paternal Aunt         early 40s    Prostate Cancer No family hx of     Cancer No family hx of     Colon Cancer No family hx of      History   Drug Use Unknown         Review of Systems    Review of Systems  CONSTITUTIONAL: NEGATIVE for fever, chills, change in weight  INTEGUMENTARY/SKIN: NEGATIVE for worrisome rashes, moles or lesions  EYES: Hx cataracts  ENT/MOUTH: NEGATIVE for ear, mouth and throat problems  RESP: NEGATIVE for significant cough or SOB  BREAST: NEGATIVE for masses, tenderness or discharge  CV: NEGATIVE for chest pain, palpitations or peripheral edema  GI: NEGATIVE for  "nausea, abdominal pain, heartburn, or change in bowel habits  : NEGATIVE for frequency, dysuria, or hematuria  MUSCULOSKELETAL: NEGATIVE for significant arthralgias or myalgia  NEURO: NEGATIVE for weakness, dizziness or paresthesias  ENDOCRINE: NEGATIVE for temperature intolerance, skin/hair changes  HEME: NEGATIVE for bleeding problems  PSYCHIATRIC: NEGATIVE for changes in mood or affect    Objective    /68 (BP Location: Left arm, Patient Position: Sitting, Cuff Size: Adult Large)   Pulse 72   Temp 97.5  F (36.4  C)   Resp 15   Ht 1.626 m (5' 4\")   Wt 86.6 kg (191 lb)   LMP  (LMP Unknown)   SpO2 99%   BMI 32.79 kg/m     Estimated body mass index is 32.79 kg/m  as calculated from the following:    Height as of this encounter: 1.626 m (5' 4\").    Weight as of this encounter: 86.6 kg (191 lb).  Physical Exam  GENERAL: alert and no distress  EYES: Eyes grossly normal to inspection, PERRL and conjunctivae and sclerae normal  HENT: ear canals and TM's normal, nose and mouth without ulcers or lesions  NECK: no adenopathy, no asymmetry, masses, or scars  RESP: lungs clear to auscultation - no rales, rhonchi or wheezes  CV: regular rate and rhythm, normal S1 S2, no S3 or S4, no murmur, click or rub, no peripheral edema  ABDOMEN: soft, nontender, no hepatosplenomegaly, no masses and bowel sounds normal  MS: no gross musculoskeletal defects noted, no edema  SKIN: no suspicious lesions or rashes  NEURO: Normal strength and tone, mentation intact and speech normal  PSYCH: mentation appears normal, affect normal/bright    Recent Labs   Lab Test 05/09/24  0647 05/03/24  0556 05/01/24  0525 04/30/24  0718 04/29/24  1611 04/29/24  1359 10/26/23  0843   HGB 9.8*  --   --  11.8  --  12.8 14.3     --   --  169  --  182 235   * 136   < > 132*   < > 128* 131*   POTASSIUM 4.5 3.8   < > 4.1  --  4.0 5.1   CR 0.95 0.85   < > 1.86*   < > 2.38* 0.88   A1C  --   --   --   --   --  5.6 6.9*    < > = values in this " interval not displayed.        Diagnostics  No labs were ordered during this visit.   No EKG required for low risk surgery (cataract, skin procedure, breast biopsy, etc).  No EKG required, no history of coronary heart disease, significant arrhythmia, peripheral arterial disease or other structural heart disease.    Revised Cardiac Risk Index (RCRI)  The patient has the following serious cardiovascular risks for perioperative complications:   - No serious cardiac risks = 0 points     RCRI Interpretation: 0 points: Class I (very low risk - 0.4% complication rate)         Signed Electronically by: ZEKE De CNP  Copy of this evaluation report is provided to requesting physician.

## 2024-05-14 NOTE — PROGRESS NOTES
Contact   Chart Review     Situation: Patient chart reviewed by .    Background: following for discharge from [facility].      Assessment: Was discharged to home on 5-10.  Patient had physical today so no outreach is needed as PCP completed assessment.     Plan/Recommendations: closed primary care coordination episode.      Kate Begum,   [facility]  [phone]

## 2024-05-14 NOTE — PATIENT INSTRUCTIONS
Preparing for Your Surgery  Getting started  A nurse will call you to review your health history and instructions. They will give you an arrival time based on your scheduled surgery time. Please be ready to share:  Your doctor's clinic name and phone number  Your medical, surgical, and anesthesia history  A list of allergies and sensitivities  A list of medicines, including herbal treatments and over-the-counter drugs  Whether the patient has a legal guardian (ask how to send us the papers in advance)  Please tell us if you're pregnant--or if there's any chance you might be pregnant. Some surgeries may injure a fetus (unborn baby), so they require a pregnancy test. Surgeries that are safe for a fetus don't always need a test, and you can choose whether to have one.   If you have a child who's having surgery, please ask for a copy of Preparing for Your Child's Surgery.    Preparing for surgery  Within 10 to 30 days of surgery: Have a pre-op exam (sometimes called an H&P, or History and Physical). This can be done at a clinic or pre-operative center.  If you're having a , you may not need this exam. Talk to your care team.  At your pre-op exam, talk to your care team about all medicines you take. If you need to stop any medicines before surgery, ask when to start taking them again.  We do this for your safety. Many medicines can make you bleed too much during surgery. Some change how well surgery (anesthesia) drugs work.  Call your insurance company to let them know you're having surgery. (If you don't have insurance, call [phone].)  Call your clinic if there's any change in your health. This includes signs of a cold or flu (sore throat, runny nose, cough, rash, fever). It also includes a scrape or scratch near the surgery site.  If you have questions on the day of surgery, call your hospital or surgery center.  Eating and drinking guidelines  For your safety: Unless your surgeon tells you otherwise,  follow the guidelines below.  Eat and drink as usual until 8 hours before you arrive for surgery. After that, no food or milk.  Drink clear liquids until 2 hours before you arrive. These are liquids you can see through, like water, Gatorade, and Propel Water. They also include plain black coffee and tea (no cream or milk), candy, and breath mints. You can spit out gum when you arrive.  If you drink alcohol: Stop drinking it the night before surgery.  If your care team tells you to take medicine on the morning of surgery, it's okay to take it with a sip of water.  Preventing infection  Shower or bathe the night before and morning of your surgery. Follow the instructions your clinic gave you. (If no instructions, use regular soap.)  Don't shave or clip hair near your surgery site. We'll remove the hair if needed.  Don't smoke or vape the morning of surgery. You may chew nicotine gum up to 2 hours before surgery. A nicotine patch is okay.  Note: Some surgeries require you to completely quit smoking and nicotine. Check with your surgeon.  Your care team will make every effort to keep you safe from infection. We will:  Clean our hands often with soap and water (or an alcohol-based hand rub).  Clean the skin at your surgery site with a special soap that kills germs.  Give you a special gown to keep you warm. (Cold raises the risk of infection.)  Wear special hair covers, masks, gowns and gloves during surgery.  Give antibiotic medicine, if prescribed. Not all surgeries need antibiotics.  What to bring on the day of surgery  Photo ID and insurance card  Copy of your health care directive, if you have one  Glasses and hearing aids (bring cases)  You can't wear contacts during surgery  Inhaler and eye drops, if you use them (tell us about these when you arrive)  CPAP machine or breathing device, if you use them  A few personal items, if spending the night  If you have . . .  A pacemaker, ICD (cardiac defibrillator) or other  implant: Bring the ID card.  An implanted stimulator: Bring the remote control.  A legal guardian: Bring a copy of the certified (court-stamped) guardianship papers.  Please remove any jewelry, including body piercings. Leave jewelry and other valuables at home.  If you're going home the day of surgery  You must have a responsible adult drive you home. They should stay with you overnight as well.  If you don't have someone to stay with you, and you aren't safe to go home alone, we may keep you overnight. Insurance often won't pay for this.  After surgery  If it's hard to control your pain or you need more pain medicine, please call your surgeon's office.  Questions?   If you have any questions for your care team, list them here: _________________________________________________________________________________________________________________________________________________________________________ ____________________________________ ____________________________________ ____________________________________  For informational purposes only. Not to replace the advice of your health care provider. Copyright   2003, 2019 [employer]. All rights reserved. Clinically reviewed by Yuliana Leos MD. [employer] 324853 - REV 12/22.    How to Take Your Medication Before Surgery  - HOLD (do not take) Aspirin for 7 days  - STOP taking all vitamins and herbal supplements 14 days before surgery.  - Can continue your atorvastatin and omeprazole  - Hold lisinopril morning of surgery

## 2024-05-15 ENCOUNTER — DOCUMENTATION ONLY (OUTPATIENT)
Dept: OTHER | Facility: CLINIC | Age: 69
End: 2024-05-15
Payer: COMMERCIAL

## 2024-05-16 ENCOUNTER — MEDICAL CORRESPONDENCE (OUTPATIENT)
Dept: HEALTH INFORMATION MANAGEMENT | Facility: CLINIC | Age: 69
End: 2024-05-16

## 2024-05-17 ENCOUNTER — TELEPHONE (OUTPATIENT)
Dept: FAMILY MEDICINE | Facility: CLINIC | Age: 69
End: 2024-05-17
Payer: COMMERCIAL

## 2024-05-17 NOTE — TELEPHONE ENCOUNTER
Verbal Orders    Skilled nursing   1 x a week for 3 weeks     Home Health Aide  1 x a week for 2 weeks     [facility]  [phone]

## 2024-05-17 NOTE — TELEPHONE ENCOUNTER
Home Health Care    Reason for call:  Verbal Orders    Orders are needed for this patient.  OT: Starting week of 5/27 1 visit per week for 4 weeks for balance, walking and strengthening     Pt Provider: Dr. Shelbi Little     Phone Number Homecare Nurse can be reached at: [phone] Amber with [facility].  Okay to leave detailed message voice mail is secure and confidential.

## 2024-05-17 NOTE — TELEPHONE ENCOUNTER
Home Care is calling regarding an established patient with [facility].       Requesting orders from: Shelbi Little  Provider is following patient: No       Orders Requested    Skilled Nursing  Request for initial certification (first set of orders)   Frequency:  1x/wk for 3 wks      HHA (Home Health Aide)  Request for initial certification (first set of orders)   Frequency: then 1x/wk for 2 wks      Information was gathered and will be sent to provider for review.  RN will contact Home Care with information after provider review.  Information was gathered and will be sent to provider to confirm provider will be following patient.  RN will contact Home Care with information after provider review.

## 2024-05-17 NOTE — TELEPHONE ENCOUNTER
Home Care is calling regarding an established patient with [facility].       Requesting orders from: Shelbi Little  Provider is following patient: Yes  Is this a 60-day recertification request?  No    Orders Requested    Occupational Therapy  Request for initial certification (first set of orders)   Frequency:  Starting week of 5/27 1 visit per week for 4 weeks for balance, walking and strengthening       Information was gathered and will be sent to provider for review.  RN will contact Home Care with information after provider review.    Radha Arauz RN

## 2024-05-21 NOTE — TELEPHONE ENCOUNTER
Left message for Love explaining that we are not able to approve home care orders at this time as the patient has not been seen by Dr. Little since 2022.  Additionally that we will contact patient to inform of her need for appointment.

## 2024-05-21 NOTE — TELEPHONE ENCOUNTER
Left message for patient that PCP unable to give home care orders as she has not seen patient since 2022, to call back to get scheduled for office visit in order to facilitate home care orders.

## 2024-05-21 NOTE — TELEPHONE ENCOUNTER
I have not seen patient since 2022. She needs to be seen for home care orders. Can be with me or any other provider at the clinic

## 2024-05-23 NOTE — TELEPHONE ENCOUNTER
Home Health Care    Reason for call:  Verbal Orders     Patient completed appt with CLEM Chavez on 05/14/24. Would like to know if provider will follow patient by phone and fax for home care services until patient can be seen by primary care provider Dr. Shelbi Little on 07/08/24?    Orders are needed for this patient.     Skilled Nursing  Request for initial certification (first set of orders)   Frequency:  1x/wk for 3 wks        HHA (Home Health Aide)  Request for initial certification (first set of orders)   Frequency: then 1x/wk for 2 wks         Pt Provider: Dr. Shelbi Little has declined as patient has not been seen in two years. Patient is scheduled for 07/08/24.  Patient however, completed Pre Op on 05/14/24 [facility] is requesting to see if CLEM Gage will follow patient temporarily?    Phone Number Homecare Nurse can be reached at: [phone] Okay to leave detailed message. Voice mail is secure and confidential.

## 2024-05-24 NOTE — TELEPHONE ENCOUNTER
Home Care is calling regarding an established patient with  [facility].        5/24/2024     8:27 AM   Home Care Information   Current following provider Sirena Chavez (temporary)   Date provider agreed to follow 5/24/2024   Home Care agency [facility]     Requesting orders from:  Sirena Chavez CNP  Provider is following patient: Yes (temporary)  Is this a 60-day recertification request?  No    Orders Requested    Skilled Nursing  Request for initial certification (first set of orders)   Frequency:  1x/wk for 3 wks    Physical Therapy  Request for initial certification (first set of orders)   Frequency:  1x/wk for 5 wks    HHA (Home Health Aide)  Request for initial certification (first set of orders)   Frequency:  1x/wk for 2 wks      Verbal orders given.  Home Care will send orders for provider to sign.  Information was gathered and will be sent to provider for review.  RN will contact Home Care with information after provider review.  Confirmed ok to leave a detailed message with call back.  Contact information confirmed and updated as needed.  See below for provider approval. Per Sirena, this will be temporary until she sees her PCP again in July. Relayed this to Je who verbalized understanding.    Neftaly Bills RN

## 2024-05-31 ENCOUNTER — TELEPHONE (OUTPATIENT)
Dept: FAMILY MEDICINE | Facility: CLINIC | Age: 69
End: 2024-05-31
Payer: COMMERCIAL

## 2024-05-31 NOTE — TELEPHONE ENCOUNTER
Home Health Care    Reason for call:  verbal ordres    Orders are needed for this patient.  PT-Move visit from 6/3 to 6/24 due to pt is having cataract surgery next week.      Pt Provider: Sirena Chavez NP    Phone Number Homecare Nurse can be reached at: [phone] ok for detailed message

## 2024-06-04 NOTE — TELEPHONE ENCOUNTER
Home Care is calling regarding an established patient with  [facility].        5/24/2024     8:27 AM   Home Care Information   Current following provider Sirena Chavez (temporary)   Date provider agreed to follow 5/24/2024   Home Care agency [facility]     Requesting orders from: Shelbi Little  Provider is following patient: Yes  Is this a 60-day recertification request?  No    Orders Requested  PT-Move visit from 6/3 to 6/24 due to pt is having cataract surgery next week.         Domonique Kelly RN

## 2024-06-06 ENCOUNTER — TELEPHONE (OUTPATIENT)
Dept: FAMILY MEDICINE | Facility: CLINIC | Age: 69
End: 2024-06-06
Payer: COMMERCIAL

## 2024-06-06 NOTE — TELEPHONE ENCOUNTER
Received fax from [facility] that needs provider to review and to sign. Form placed in Dr. Little inbox.

## 2024-06-06 NOTE — TELEPHONE ENCOUNTER
June 6, 2024    Home health orders was received via fax for Sirena Chavez DNP.  Patient label was attached to paperwork and placed in provider's inbox to be signed.    Samantha Woods

## 2024-06-07 NOTE — TELEPHONE ENCOUNTER
June 7, 2024    Home health orders was picked up from outbox of Sirena Chavez DNP and sent via fax to [phone].    Samantha Woods

## 2024-06-07 NOTE — TELEPHONE ENCOUNTER
Spoke with patient regarding not been seen. [facility]. Also saw juanjo gaitan in October. Patient is scheduled with you on 7/8/24. Form placed back in your inbox please review

## 2024-06-13 ENCOUNTER — TELEPHONE (OUTPATIENT)
Dept: FAMILY MEDICINE | Facility: CLINIC | Age: 69
End: 2024-06-13
Payer: COMMERCIAL

## 2024-06-13 NOTE — TELEPHONE ENCOUNTER
Order/Referral Request    Who is requesting: Lilliam    Orders being requested: Patient to decline any future Skilled nursing visits     [facility]   [phone]

## 2024-06-13 NOTE — TELEPHONE ENCOUNTER
Home Care is calling regarding an established patient with  [facility] [place] [facility].        5/24/2024     8:27 AM   Home Care Information   Current following provider Sirena Chavez (temporary)   Date provider agreed to follow 5/24/2024   Home Care agency [facility]     Requesting orders from:  Sirena Chavez  Provider is following patient: Yes  Is this a 60-day recertification request?  No    Orders Requested    Skilled Nursing  Request for discontinuation of care   Patient to declining any future Skilled nursing visits         Information was gathered and will be sent to provider for review.  RN will contact Home Care with information after provider review.

## 2024-06-14 ENCOUNTER — TELEPHONE (OUTPATIENT)
Dept: FAMILY MEDICINE | Facility: CLINIC | Age: 69
End: 2024-06-14
Payer: COMMERCIAL

## 2024-06-14 NOTE — TELEPHONE ENCOUNTER
June 14, 2024    Home health orders was received via fax for Dr. Little. Patient label was attached to paperwork and placed in provider's inbox to be signed.    MEÑO Powers

## 2024-06-17 NOTE — TELEPHONE ENCOUNTER
Patient needs to be seen before I can sign off on any home health orders    Will hold onto the paperwork until I see her in person

## 2024-06-18 ENCOUNTER — TELEPHONE (OUTPATIENT)
Dept: FAMILY MEDICINE | Facility: CLINIC | Age: 69
End: 2024-06-18
Payer: COMMERCIAL

## 2024-06-18 NOTE — TELEPHONE ENCOUNTER
Home Care is calling regarding an established patient with  [facility].        5/24/2024     8:27 AM   Home Care Information   Current following provider Sirena Chavez (temporary)   Date provider agreed to follow 5/24/2024   Home Care agency [facility]     Requesting orders from:  Sirena Chavez   Provider is following patient: Yes (temporarily following)  Is this a 60-day recertification request?  No    Orders Requested    Physical Therapy  Request for delay in care, service is not able to be provided within same scheduled day.   Patient declining PT visit this week.      Information was gathered and will be sent to provider for review.  RN will contact Home Care with information after provider review.    Radha Arauz RN

## 2024-06-18 NOTE — TELEPHONE ENCOUNTER
Home Health Care    Reason for call:  Verbal Orders    Orders are needed for this patient.  PT: patient is declining PT visit this week.      Pt Provider: Dr. Shelbi Little    Phone Number Homecare Nurse can be reached at: [phone] - okay to leave message per Amber.  Voice mail is secure and confidential.

## 2024-06-24 ENCOUNTER — TELEPHONE (OUTPATIENT)
Dept: FAMILY MEDICINE | Facility: CLINIC | Age: 69
End: 2024-06-24
Payer: COMMERCIAL

## 2024-06-24 NOTE — TELEPHONE ENCOUNTER
Called and left message for patient to return call. Patient is scheduled for a VV on 7/8 with Dr Little, this appt will need to be in person per provider as she needs to be seen for a Physical/AWV.    If patient calls back please assist in changing this appt to in person Physical.    Margarita Childers CMA (AAMA)  [facility]

## 2024-06-25 ENCOUNTER — TRANSFERRED RECORDS (OUTPATIENT)
Dept: HEALTH INFORMATION MANAGEMENT | Facility: CLINIC | Age: 69
End: 2024-06-25
Payer: COMMERCIAL

## 2024-06-25 NOTE — TELEPHONE ENCOUNTER
Called and spoke with patient and she wanted to cancel the appt on 7.8.24 and keep the one on the 7.22.24

## 2024-06-26 ENCOUNTER — TELEPHONE (OUTPATIENT)
Dept: FAMILY MEDICINE | Facility: CLINIC | Age: 69
End: 2024-06-26
Payer: COMMERCIAL

## 2024-06-26 NOTE — TELEPHONE ENCOUNTER
Fax received from [facility] with Urgent notice    Order Number # [id number]    Placed into PCP inbox for review until upcoming appt.    Per providers previous statements on incoming forms- these will not be signed until the patient is seen in person for appt. Patient has not seen Dr Little since 6/14/2022.    I called [facility] and spoke to the sender- Yumi Mchugh (terrence Lares). Advised her that patient has not been seen and forms will not be signed from Dr Little. Yumi states she is going to look into this and call clinic back tomorrow.     Form is placed into PCP inbox until next appt.    Margarita Childers CMA ([facility]

## 2024-07-02 ENCOUNTER — E-VISIT (OUTPATIENT)
Dept: INTERNAL MEDICINE | Facility: CLINIC | Age: 69
End: 2024-07-02
Payer: COMMERCIAL

## 2024-07-02 DIAGNOSIS — N39.0 ACUTE UTI (URINARY TRACT INFECTION): Primary | ICD-10-CM

## 2024-07-02 PROCEDURE — 99421 OL DIG E/M SVC 5-10 MIN: CPT | Performed by: INTERNAL MEDICINE

## 2024-07-02 RX ORDER — SULFAMETHOXAZOLE/TRIMETHOPRIM 800-160 MG
1 TABLET ORAL 2 TIMES DAILY
Qty: 6 TABLET | Refills: 0 | Status: SHIPPED | OUTPATIENT
Start: 2024-07-02 | End: 2024-07-05

## 2024-07-02 NOTE — PATIENT INSTRUCTIONS
Dear Gabi Sandhu    After reviewing your responses, I've been able to diagnose you with a urinary tract infection, which is a common infection of the bladder with bacteria.  This is not a sexually transmitted infection, though urinating immediately after intercourse can help prevent infections.  Drinking lots of fluids is also helpful to clear your current infection and prevent the next one.      I have sent a prescription for antibiotics to your pharmacy to treat this infection.    It is important that you take all of your prescribed medication even if your symptoms are improving after a few doses.  Taking all of your medicine helps prevent the symptoms from returning.     If your symptoms worsen, you develop pain in your back or stomach, develop fevers, or are not improving in 5 days, please contact your primary care provider for an appointment or visit any of our convenient Walk-in or Urgent Care Centers to be seen, which can be found on our website here.    Thanks again for choosing us as your health care partner,    Luis Alberto Mccormick MD

## 2024-07-17 SDOH — HEALTH STABILITY: PHYSICAL HEALTH: ON AVERAGE, HOW MANY DAYS PER WEEK DO YOU ENGAGE IN MODERATE TO STRENUOUS EXERCISE (LIKE A BRISK WALK)?: 0 DAYS

## 2024-07-17 SDOH — HEALTH STABILITY: PHYSICAL HEALTH: ON AVERAGE, HOW MANY MINUTES DO YOU ENGAGE IN EXERCISE AT THIS LEVEL?: 0 MIN

## 2024-07-17 ASSESSMENT — SOCIAL DETERMINANTS OF HEALTH (SDOH): HOW OFTEN DO YOU GET TOGETHER WITH FRIENDS OR RELATIVES?: ONCE A WEEK

## 2024-07-19 ENCOUNTER — TELEPHONE (OUTPATIENT)
Dept: FAMILY MEDICINE | Facility: CLINIC | Age: 69
End: 2024-07-19
Payer: COMMERCIAL

## 2024-07-19 NOTE — TELEPHONE ENCOUNTER
Fax received from [facility] needs PCP review/signature. Placed into PCP inbox for review.    Order #[id number]    Margarita Childers CMA ([facility])  [facility]

## 2024-07-21 ENCOUNTER — HEALTH MAINTENANCE LETTER (OUTPATIENT)
Age: 69
End: 2024-07-21

## 2024-07-22 ENCOUNTER — OFFICE VISIT (OUTPATIENT)
Dept: FAMILY MEDICINE | Facility: CLINIC | Age: 69
End: 2024-07-22
Payer: COMMERCIAL

## 2024-07-22 VITALS
BODY MASS INDEX: 35.54 KG/M2 | HEIGHT: 63 IN | HEART RATE: 70 BPM | RESPIRATION RATE: 16 BRPM | TEMPERATURE: 98.2 F | SYSTOLIC BLOOD PRESSURE: 142 MMHG | DIASTOLIC BLOOD PRESSURE: 72 MMHG | OXYGEN SATURATION: 99 % | WEIGHT: 200.6 LBS

## 2024-07-22 DIAGNOSIS — D50.9 IRON DEFICIENCY ANEMIA, UNSPECIFIED IRON DEFICIENCY ANEMIA TYPE: ICD-10-CM

## 2024-07-22 DIAGNOSIS — E78.5 HYPERLIPIDEMIA, UNSPECIFIED HYPERLIPIDEMIA TYPE: ICD-10-CM

## 2024-07-22 DIAGNOSIS — E11.65 TYPE 2 DIABETES MELLITUS WITH HYPERGLYCEMIA, WITHOUT LONG-TERM CURRENT USE OF INSULIN (H): ICD-10-CM

## 2024-07-22 DIAGNOSIS — M87.9 OSTEONECROSIS OF BOTH HIPS (H): ICD-10-CM

## 2024-07-22 DIAGNOSIS — E66.812 CLASS 2 SEVERE OBESITY DUE TO EXCESS CALORIES WITH SERIOUS COMORBIDITY AND BODY MASS INDEX (BMI) OF 35.0 TO 35.9 IN ADULT (H): ICD-10-CM

## 2024-07-22 DIAGNOSIS — E83.42 HYPOMAGNESEMIA: ICD-10-CM

## 2024-07-22 DIAGNOSIS — K21.9 GASTROESOPHAGEAL REFLUX DISEASE WITHOUT ESOPHAGITIS: ICD-10-CM

## 2024-07-22 DIAGNOSIS — I10 ESSENTIAL HYPERTENSION: ICD-10-CM

## 2024-07-22 DIAGNOSIS — R19.5 POSITIVE COLORECTAL CANCER SCREENING USING COLOGUARD TEST: ICD-10-CM

## 2024-07-22 DIAGNOSIS — Z90.710 H/O TOTAL HYSTERECTOMY: ICD-10-CM

## 2024-07-22 DIAGNOSIS — Z12.11 SCREEN FOR COLON CANCER: ICD-10-CM

## 2024-07-22 DIAGNOSIS — I87.8 VENOUS STASIS: ICD-10-CM

## 2024-07-22 DIAGNOSIS — I10 BENIGN ESSENTIAL HYPERTENSION: ICD-10-CM

## 2024-07-22 DIAGNOSIS — Z00.00 ENCOUNTER FOR MEDICARE ANNUAL WELLNESS EXAM: Primary | ICD-10-CM

## 2024-07-22 DIAGNOSIS — R35.0 URINARY FREQUENCY: ICD-10-CM

## 2024-07-22 DIAGNOSIS — E66.01 CLASS 2 SEVERE OBESITY DUE TO EXCESS CALORIES WITH SERIOUS COMORBIDITY AND BODY MASS INDEX (BMI) OF 35.0 TO 35.9 IN ADULT (H): ICD-10-CM

## 2024-07-22 DIAGNOSIS — E87.6 HYPOKALEMIA: ICD-10-CM

## 2024-07-22 DIAGNOSIS — Z79.899 CURRENT USE OF PROTON PUMP INHIBITOR: ICD-10-CM

## 2024-07-22 DIAGNOSIS — R26.9 IMPAIRED GAIT: ICD-10-CM

## 2024-07-22 LAB
ALBUMIN SERPL BCG-MCNC: 4.2 G/DL (ref 3.5–5.2)
ALBUMIN UR-MCNC: NEGATIVE MG/DL
ALP SERPL-CCNC: 111 U/L (ref 40–150)
ALT SERPL W P-5'-P-CCNC: 10 U/L (ref 0–50)
ANION GAP SERPL CALCULATED.3IONS-SCNC: 10 MMOL/L (ref 7–15)
APPEARANCE UR: CLEAR
AST SERPL W P-5'-P-CCNC: 19 U/L (ref 0–45)
BACTERIA #/AREA URNS HPF: ABNORMAL /HPF
BILIRUB SERPL-MCNC: 0.3 MG/DL
BILIRUB UR QL STRIP: NEGATIVE
BUN SERPL-MCNC: 20.1 MG/DL (ref 8–23)
CALCIUM SERPL-MCNC: 9.8 MG/DL (ref 8.8–10.4)
CHLORIDE SERPL-SCNC: 93 MMOL/L (ref 98–107)
CHOLEST SERPL-MCNC: 136 MG/DL
COLOR UR AUTO: YELLOW
CREAT SERPL-MCNC: 0.9 MG/DL (ref 0.51–0.95)
EGFRCR SERPLBLD CKD-EPI 2021: 68 ML/MIN/1.73M2
ERYTHROCYTE [DISTWIDTH] IN BLOOD BY AUTOMATED COUNT: 11.3 % (ref 10–15)
FASTING STATUS PATIENT QL REPORTED: ABNORMAL
FASTING STATUS PATIENT QL REPORTED: NORMAL
GLUCOSE SERPL-MCNC: 127 MG/DL (ref 70–99)
GLUCOSE UR STRIP-MCNC: NEGATIVE MG/DL
HCO3 SERPL-SCNC: 23 MMOL/L (ref 22–29)
HCT VFR BLD AUTO: 32.6 % (ref 35–47)
HDLC SERPL-MCNC: 59 MG/DL
HGB BLD-MCNC: 11.8 G/DL (ref 11.7–15.7)
HGB UR QL STRIP: NEGATIVE
KETONES UR STRIP-MCNC: NEGATIVE MG/DL
LDLC SERPL CALC-MCNC: 60 MG/DL
LEUKOCYTE ESTERASE UR QL STRIP: ABNORMAL
MAGNESIUM SERPL-MCNC: 1.5 MG/DL (ref 1.7–2.3)
MCH RBC QN AUTO: 33.7 PG (ref 26.5–33)
MCHC RBC AUTO-ENTMCNC: 36.2 G/DL (ref 31.5–36.5)
MCV RBC AUTO: 93 FL (ref 78–100)
NITRATE UR QL: NEGATIVE
NONHDLC SERPL-MCNC: 77 MG/DL
PH UR STRIP: 6 [PH] (ref 5–8)
PLATELET # BLD AUTO: 236 10E3/UL (ref 150–450)
POTASSIUM SERPL-SCNC: 5 MMOL/L (ref 3.4–5.3)
PROT SERPL-MCNC: 7.6 G/DL (ref 6.4–8.3)
RBC # BLD AUTO: 3.5 10E6/UL (ref 3.8–5.2)
RBC #/AREA URNS AUTO: ABNORMAL /HPF
SODIUM SERPL-SCNC: 126 MMOL/L (ref 135–145)
SP GR UR STRIP: 1.01 (ref 1–1.03)
SQUAMOUS #/AREA URNS AUTO: ABNORMAL /LPF
TRIGL SERPL-MCNC: 86 MG/DL
UROBILINOGEN UR STRIP-ACNC: 0.2 E.U./DL
WBC # BLD AUTO: 9.1 10E3/UL (ref 4–11)
WBC #/AREA URNS AUTO: ABNORMAL /HPF

## 2024-07-22 PROCEDURE — 36415 COLL VENOUS BLD VENIPUNCTURE: CPT | Performed by: STUDENT IN AN ORGANIZED HEALTH CARE EDUCATION/TRAINING PROGRAM

## 2024-07-22 PROCEDURE — 80053 COMPREHEN METABOLIC PANEL: CPT | Performed by: STUDENT IN AN ORGANIZED HEALTH CARE EDUCATION/TRAINING PROGRAM

## 2024-07-22 PROCEDURE — 81001 URINALYSIS AUTO W/SCOPE: CPT | Performed by: STUDENT IN AN ORGANIZED HEALTH CARE EDUCATION/TRAINING PROGRAM

## 2024-07-22 PROCEDURE — 99214 OFFICE O/P EST MOD 30 MIN: CPT | Mod: 25 | Performed by: STUDENT IN AN ORGANIZED HEALTH CARE EDUCATION/TRAINING PROGRAM

## 2024-07-22 PROCEDURE — 91320 SARSCV2 VAC 30MCG TRS-SUC IM: CPT | Performed by: STUDENT IN AN ORGANIZED HEALTH CARE EDUCATION/TRAINING PROGRAM

## 2024-07-22 PROCEDURE — 80061 LIPID PANEL: CPT | Performed by: STUDENT IN AN ORGANIZED HEALTH CARE EDUCATION/TRAINING PROGRAM

## 2024-07-22 PROCEDURE — 90480 ADMN SARSCOV2 VAC 1/ONLY CMP: CPT | Performed by: STUDENT IN AN ORGANIZED HEALTH CARE EDUCATION/TRAINING PROGRAM

## 2024-07-22 PROCEDURE — 85027 COMPLETE CBC AUTOMATED: CPT | Performed by: STUDENT IN AN ORGANIZED HEALTH CARE EDUCATION/TRAINING PROGRAM

## 2024-07-22 PROCEDURE — G0439 PPPS, SUBSEQ VISIT: HCPCS | Performed by: STUDENT IN AN ORGANIZED HEALTH CARE EDUCATION/TRAINING PROGRAM

## 2024-07-22 PROCEDURE — 83735 ASSAY OF MAGNESIUM: CPT | Performed by: STUDENT IN AN ORGANIZED HEALTH CARE EDUCATION/TRAINING PROGRAM

## 2024-07-22 RX ORDER — ATORVASTATIN CALCIUM 40 MG/1
40 TABLET, FILM COATED ORAL DAILY
Qty: 90 TABLET | Refills: 0 | Status: SHIPPED | OUTPATIENT
Start: 2024-07-22

## 2024-07-22 RX ORDER — LISINOPRIL 20 MG/1
TABLET ORAL
Qty: 90 TABLET | Refills: 0 | Status: SHIPPED | OUTPATIENT
Start: 2024-07-22

## 2024-07-22 ASSESSMENT — PAIN SCALES - GENERAL: PAINLEVEL: NO PAIN (0)

## 2024-07-22 NOTE — PROGRESS NOTES
Preventive Care Visit  [facility]  Jul 22, 2024      Assessment & Plan      Diagnosis Comments   1. Encounter for Medicare annual wellness exam        2. Hyperlipidemia, unspecified hyperlipidemia type  atorvastatin (LIPITOR) 40 MG tablet       3. Benign essential hypertension  lisinopril (ZESTRIL) 20 MG tablet       4. Gastroesophageal reflux disease without esophagitis  omeprazole (PRILOSEC) 20 MG DR capsule       5. Screen for colon cancer        6. Type 2 diabetes mellitus with hyperglycemia, without long-term current use of insulin (H)  Lipid panel reflex to direct LDL Non-fasting       7. Positive colorectal cancer screening using Cologuard test        8. Iron deficiency anemia, unspecified iron deficiency anemia type  CBC with platelets      9. Hypokalemia  Comprehensive metabolic panel (BMP + Alb, Alk Phos, ALT, AST, Total. Bili, TP)       10. Hypomagnesemia  Magnesium       11. Venous stasis  Compression Sleeve/Stocking Order for DME - ONLY FOR DME       12. Urinary frequency  UA with Microscopic reflex to Culture - lab collect, UA Microscopic with Reflex to Culture       13. H/O total hysterectomy        14. Class 2 severe obesity due to excess calories with serious comorbidity and body mass index (BMI) of 35.0 to 35.9 in adult (H)        15. Osteonecrosis of both hips (H)        16. Essential hypertension        17. Impaired gait        18. Current use of proton pump inhibitor                Patient is a 69-year-old female with PMH of type 2 diabetes, left-sided hearing loss, hyperlipidemia, essential hypertension, acid reflux, bilateral osteo process of the femoral heads who presents today for a physical.  I have not seen patient in over a year.      Home life: Lives by herself  Occupation: Retired  Sexually active: no   Safety concerns: no   Diet/exercise: Tries to eat healthy.  Is quite sedentary.  Elevated BMI.  Counseling provided.  Family  history of cancers:Had a paternal aunt with breast cancer in her 40s and mother with thyroid cancer.  Eye exam/dental exam: UTD   Alcohol use: No heavy use reported  Tobacco use/marijuana use/drug use: no   Mental health:stable. Doing better   Vaccines: Okay for COVID.  She will wait for the RSV vaccine in the fall.  Blood work: ordered       Menses/menopause: Postmenopausal without any atrophic vaginitis.  DEXA : 2022-normal bone density.  Last Pap smear: s/p hysterectomy   Mammogram: Has an upcoming mammogram on 7/25.  Last 1 was in 2022  Colon cancer screening: History of positive Cologuard in 2018.  Patient did not follow-up with a colonoscopy.  Is not interested in revisiting colon cancer screening or doing a repeat Cologuard.  Understands that there is a chance we might miss malignancy.      IADLs/ADLs intact: Intact.  Driving: No, s/p cataract surgery.  Has a friend who drives her.  This makes it difficult for her to come to her doctors appointments.  Memory: Stable  Constipation, vision, hearing concerns: is worried about her hearing but does not want to see audiology at this time.  Does not drive.  Falls over the last year: Yes    Type 2 diabetes:  Last A1c on file 5.6 from 4/29/2024  Is diet controlled currently  Is not checking her sugars regularly  Plan:  - Will recheck A1c today  - Is quite sedentary, advised her to increase her activity level    Chronic PPI usage:  Last time she saw my colleague Natalia, she had encouraged her to come off the PPI by introducing famotidine  Today, she continues on the omeprazole and is not using the famotidine at all.    Generalized weakness:  Recurrent falls  Patient ended up in the emergency room 4/18/2024 for fall after she lost her balance  She then went to the emergency room again 4/29 for generalized weakness and falls  CT spine and CT were unremarkable both times she was in the hospital  Patient has chronic balance issues  She did just finish home health therapy  "and feels that she is stronger.  She does not want any additional intervention for this at this time.  She is feeling stronger and more balanced  She wants to monitor for now    Acute cystitis:  During the hospitalization, patient was found to have E. coli UTI.  Treated with treated with ceftriaxone and discharged on cefdinir.  Today, she is asymptomatic except for ongoing urinary frequency.  Plan:  - Recheck UA to assess for UTI    Hyponatremia:  Noted in the hospital and presumed to be 2/2 dehydration  Recheck today    Hypomagnesemia:  Repleted during the hospitalization  Recheck today    Essential hypertension:  During the hospitalization, lisinopril was held 2/2 GILSON but resumed at the time of discharge  Blood pressure today is elevated at 142/72.  Will recheck BMP and adjust medications as indicated.    Bilateral osteonecrosis of the femoral her heads without articular surface collapse:  Gait instability  Patient had orthopedics consulted in the emergency room.  They did not recommend any intervention  Today, patient reports that she does not have any significant hip pain.  She does still have a hard time walking long distances.  She is not interested in seeing orthopedics at this time.  She would like to monitor clinically.    Chronic venous stasis:  Requesting compression stockings.  Provided.    Anemia:  Noted on last set of blood work.  Repeat CBC collected today.      Patient has been advised of split billing requirements and indicates understanding: Yes       35 minutes in addition to the annual preventative spent on the date of the encounter doing chart review, history and exam, documentation and further activities per the note        BMI  Estimated body mass index is 35.53 kg/m  as calculated from the following:    Height as of this encounter: 1.6 m (5' 3\").    Weight as of this encounter: 91 kg (200 lb 9.6 oz).   Weight management plan: Discussed healthy diet and exercise " guidelines    Counseling  Appropriate preventive services were addressed with this patient via screening, questionnaire, or discussion as appropriate for fall prevention, nutrition, physical activity, Tobacco-use cessation, weight loss and cognition.  Checklist reviewing preventive services available has been given to the patient.  Reviewed patient's diet, addressing concerns and/or questions.   Discussed possible causes of fatigue. The patient was provided with written information regarding signs of hearing loss.       Gina Ashley is a 69 year old, presenting for the following:  Annual Visit (Annual wellness visit today. No questions. )        7/22/2024    12:34 PM   Additional Questions   Roomed by Teri DIETRICH MA   Accompanied by Self           Patient was admitted to the hospital 4/29/2024 to 5/3/2024 at [facility] for acute cystitis.  Also found to have hypomagnesemia, hyponatremia and GILSON.  GILSON was felt to be 2/2 dehydration.  Renal ultrasound unremarkable and ACE was resumed at the time of discharge.  Also found to have a UTI and was discharged with oral antibiotics.  Was reported to have generalized weakness and recurrent falls.  Was sent home with home health therapy.    Is here today for follow-up on general health as I have not seen her since 2022.  Health Care Directive  Patient has a Health Care Directive on file  Discussed advance care planning with patient.    HPI        7/17/2024   General Health   How would you rate your overall physical health? Good   Feel stress (tense, anxious, or unable to sleep) To some extent      (!) STRESS CONCERN      7/17/2024   Nutrition   Diet: Regular (no restrictions)            7/17/2024   Exercise   Days per week of moderate/strenous exercise 0 days   Average minutes spent exercising at this level 0 min      (!) EXERCISE CONCERN      7/17/2024   Social Factors   Frequency of gathering with friends or relatives Once a week   Worry food won't last until get  money to buy more No   Food not last or not have enough money for food? No   Do you have housing? (Housing is defined as stable permanent housing and does not include staying ouside in a car, in a tent, in an abandoned building, in an overnight shelter, or couch-surfing.) Yes   Are you worried about losing your housing? No   Lack of transportation? No   Unable to get utilities (heat,electricity)? No            7/22/2024   Fall Risk   Gait Speed Test (Document in seconds) 8.19             7/17/2024   Activities of Daily Living- Home Safety   Needs help with the following daily activites None of the above   Safety concerns in the home None of the above            7/17/2024   Dental   Dentist two times every year? Yes            7/17/2024   Hearing Screening   Hearing concerns? (!) I NEED TO ASK PEOPLE TO SPEAK UP OR REPEAT THEMSELVES.    (!) IT'S HARD TO FOLLOW A CONVERSATION IN A NOISY RESTAURANT OR CROWDED ROOM.       Multiple values from one day are sorted in reverse-chronological order         7/17/2024   Driving Risk Screening   Patient/family members have concerns about driving No            7/17/2024   General Alertness/Fatigue Screening   Have you been more tired than usual lately? (!) YES            7/17/2024   Urinary Incontinence Screening   Bothered by leaking urine in past 6 months No                 Today's PHQ-2 Score:       5/14/2024     8:58 AM   PHQ-2 ( 1999 [employer])   Q1: Little interest or pleasure in doing things 0   Q2: Feeling down, depressed or hopeless 0   PHQ-2 Score 0   Q1: Little interest or pleasure in doing things Not at all   Q2: Feeling down, depressed or hopeless Not at all   PHQ-2 Score 0         7/17/2024   Substance Use   Alcohol more than 3/day or more than 7/wk No   Do you have a current opioid prescription? No   How severe/bad is pain from 1 to 10? 0/10 (No Pain)   Do you use any other substances recreationally? No        Social History     Tobacco Use    Smoking status: Never     Smokeless tobacco: Never   Vaping Use    Vaping status: Never Used   Substance Use Topics    Alcohol use: Not Currently     Alcohol/week: 12.0 - 15.0 standard drinks of alcohol     Comment: 6-7 per week    Drug use: Never         5/4/2022   LAST FHS-7 RESULTS   1st degree relative breast or ovarian cancer No   Any relative bilateral breast cancer No   Any male have breast cancer No   Any ONE woman have BOTH breast AND ovarian cancer No   Any woman with breast cancer before 50yrs Yes   2 or more relatives with breast AND/OR ovarian cancer No   2 or more relatives with breast AND/OR bowel cancer No           Mammogram Screening - Mammogram every 1-2 years updated in Health Maintenance based on mutual decision making      History of abnormal Pap smear: No - age 30- 64 PAP with HPV every 5 years recommended       ASCVD Risk   The 10-year ASCVD risk score (Sheron PARNELL, et al., 2019) is: 24.5%    Values used to calculate the score:      Age: 69 years      Sex: Female      Is Non- : No      Diabetic: Yes      Tobacco smoker: No      Systolic Blood Pressure: 142 mmHg      Is BP treated: Yes      HDL Cholesterol: 59 mg/dL      Total Cholesterol: 136 mg/dL        Reviewed and updated as needed this visit by Provider   Tobacco  Allergies  Meds  Problems  Med Hx  Surg Hx  Fam Hx            Past Medical History:   Diagnosis Date    Diabetes (H) 2005     Past Surgical History:   Procedure Laterality Date    APPENDECTOMY  1989    CHOLECYSTECTOMY      COLONOSCOPY  1988    GYN SURGERY  1998    HYSTERECTOMY  01/01/1998    for dysfunctional uterine bleeding, no cancer    OOPHORECTOMY Bilateral 01/01/1998    OVARIAN CYST SURGERY      TONSILLECTOMY       OB History   No obstetric history on file.     Lab work is in process  Labs reviewed in EPIC  BP Readings from Last 3 Encounters:   07/22/24 (!) 142/72   05/14/24 127/68   05/03/24 132/64    Wt Readings from Last 3 Encounters:   07/22/24 91 kg (200  lb 9.6 oz)   05/14/24 86.6 kg (191 lb)   04/29/24 82.1 kg (181 lb)                  Patient Active Problem List   Diagnosis    Acid reflux    Benign essential hypertension    Hyperlipidemia, unspecified hyperlipidemia type    Type 2 diabetes mellitus with hyperglycemia, without long-term current use of insulin (H)    Sudden hearing loss, left    Positive colorectal cancer screening using Cologuard test    Hypomagnesemia    Hyponatremia    Falls frequently    GILSON (acute kidney injury) (H24)    Class 1 obesity due to excess calories with serious comorbidity and body mass index (BMI) of 32.0 to 32.9 in adult     Past Surgical History:   Procedure Laterality Date    APPENDECTOMY  1989    CHOLECYSTECTOMY      COLONOSCOPY  1988    GYN SURGERY  1998    HYSTERECTOMY  01/01/1998    for dysfunctional uterine bleeding, no cancer    OOPHORECTOMY Bilateral 01/01/1998    OVARIAN CYST SURGERY      TONSILLECTOMY         Social History     Tobacco Use    Smoking status: Never    Smokeless tobacco: Never   Substance Use Topics    Alcohol use: Not Currently     Alcohol/week: 12.0 - 15.0 standard drinks of alcohol     Comment: 6-7 per week     Family History   Problem Relation Age of Onset    Diabetes Mother     Thyroid Cancer Mother     Heart Disease Father         CHF    Diabetes Brother     Breast Cancer Paternal Aunt         early 40s    Prostate Cancer No family hx of     Cancer No family hx of     Colon Cancer No family hx of          Current Outpatient Medications   Medication Sig Dispense Refill    aspirin 81 MG EC tablet [ASPIRIN 81 MG EC TABLET] Take 81 mg by mouth once daily.      atorvastatin (LIPITOR) 40 MG tablet Take 1 tablet (40 mg) by mouth daily 90 tablet 0    blood sugar diagnostic (ONETOUCH VERIO) Strp [BLOOD SUGAR DIAGNOSTIC (ONETOUCH VERIO) STRP] Apply 1 each topically 2 (two) times a day.      cholecalciferol, vitamin D3, 2,000 unit Tab [CHOLECALCIFEROL, VITAMIN D3, 2,000 UNIT TAB] Take 1 tablet by mouth once  daily.      lisinopril (ZESTRIL) 20 MG tablet [LISINOPRIL (PRINIVIL,ZESTRIL) 20 MG TABLET] TAKE 1 TABLET BY MOUTH EVERY DAY 90 tablet 0    Omega-3 Fatty Acids (FISH OIL) 1200 MG capsule Take 1,200 mg by mouth daily      omeprazole (PRILOSEC) 20 MG DR capsule TAKE 1 CAPSULE (20 MG TOTAL) BY MOUTH DAILY BEFORE BREAKFAST. 90 capsule 0    timolol maleate (TIMOPTIC) 0.5 % ophthalmic solution [TIMOLOL MALEATE (TIMOPTIC) 0.5 % OPHTHALMIC SOLUTION] APPLY 1 DROP IN BOTH EYES ONCE IN THE MORNING  3    magnesium oxide (MAG-OX) 400 MG tablet Take 1 tablet (400 mg) by mouth daily 90 tablet 0     No Known Allergies  Recent Labs   Lab Test 07/22/24  1411 05/09/24  0647 04/29/24  1611 04/29/24  1359 10/26/23  0843 05/02/23  0952 06/14/22  1243 06/14/22  1243 01/27/22  1235 06/09/20  0920 06/09/20  0920 07/09/18  0753   A1C  --   --   --  5.6 6.9* 7.4*  --  8.0* 8.9*  --  7.0*  --    LDL 60  --   --   --   --  137*  --   --  157*  --  164*  --    HDL 59  --   --   --   --  50  --   --  40*  --  46*  --    TRIG 86  --   --   --   --  132  --   --  232*  --  154*  --    ALT 10  --   --   --   --   --   --  28 57*  --  22  --    CR 0.90 0.95   < > 2.38* 0.88  --   --  0.95 1.09  --  0.84 0.92   GFRESTIMATED 68 64   < > 21* 71  --   --  65 55*   < > >60 >60   GFRESTBLACK  --   --   --   --   --   --   --   --   --   --  >60 >60   POTASSIUM 5.0 4.5   < > 4.0 5.1  --    < > 4.7 4.1  --  4.2 4.9   TSH  --   --   --   --  4.21*  --   --   --   --   --   --   --     < > = values in this interval not displayed.      Current providers sharing in care for this patient include:  Patient Care Team:  Shelbi Little DO as PCP - General (Family Medicine)  Natalia Kenny NP as Assigned PCP    The following health maintenance items are reviewed in Epic and correct as of today:  Health Maintenance   Topic Date Due    COLORECTAL CANCER SCREENING  Never done    RSV VACCINE (Pregnancy & 60+) (1 - 1-dose 60+ series) Never done    MICROALBUMIN  06/16/2023  "   INFLUENZA VACCINE (1) 09/01/2024    DIABETIC FOOT EXAM  10/26/2024    ANNUAL REVIEW OF HM ORDERS  10/26/2024    A1C  10/29/2024    EYE EXAM  06/25/2025    MEDICARE ANNUAL WELLNESS VISIT  07/22/2025    BMP  07/22/2025    LIPID  07/22/2025    FALL RISK ASSESSMENT  07/22/2025    MAMMO SCREENING  07/31/2026    DTAP/TDAP/TD IMMUNIZATION (2 - Td or Tdap) 11/11/2026    ADVANCE CARE PLANNING  05/15/2029    DEXA  05/04/2037    PHQ-2 (once per calendar year)  Completed    Pneumococcal Vaccine: 65+ Years  Completed    ZOSTER IMMUNIZATION  Completed    COVID-19 Vaccine  Completed    IPV IMMUNIZATION  Aged Out    HPV IMMUNIZATION  Aged Out    MENINGITIS IMMUNIZATION  Aged Out    RSV MONOCLONAL ANTIBODY  Aged Out    HEPATITIS C SCREENING  Discontinued         Review of Systems  Constitutional, neuro, ENT, endocrine, pulmonary, cardiac, gastrointestinal, genitourinary, musculoskeletal, integument and psychiatric systems are negative, except as otherwise noted.     Objective    Exam  BP (!) 142/72   Pulse 70   Temp 98.2  F (36.8  C) (Oral)   Resp 16   Ht 1.6 m (5' 3\")   Wt 91 kg (200 lb 9.6 oz)   LMP  (LMP Unknown)   SpO2 99%   BMI 35.53 kg/m     Estimated body mass index is 35.53 kg/m  as calculated from the following:    Height as of this encounter: 1.6 m (5' 3\").    Weight as of this encounter: 91 kg (200 lb 9.6 oz).    Physical Exam  GENERAL: alert and no distress, hard of hearing  NECK: no adenopathy, no asymmetry, masses, or scars  RESP: lungs clear to auscultation - no rales, rhonchi or wheezes  CV: regular rate and rhythm, no murmur, click or rub, no peripheral edema  MS: no gross musculoskeletal defects noted, no edema  Uses cane at baseline   Slightly unsteady with walking   PSYCH: mentation appears normal, affect anxious, somewhat circular reasoning          7/22/2024   Mini Cog   Clock Draw Score 2 Normal   3 Item Recall 3 objects recalled   Mini Cog Total Score 5          Signed Electronically by: Shelbi " DO [place]

## 2024-07-24 DIAGNOSIS — E83.42 HYPOMAGNESEMIA: ICD-10-CM

## 2024-07-24 DIAGNOSIS — E87.1 HYPONATREMIA: Primary | ICD-10-CM

## 2024-07-24 RX ORDER — MAGNESIUM OXIDE 400 MG/1
400 TABLET ORAL DAILY
Qty: 90 TABLET | Refills: 0 | Status: SHIPPED | OUTPATIENT
Start: 2024-07-24

## 2024-07-24 NOTE — TELEPHONE ENCOUNTER
Form has been completed by provider and faxed back to [facility].     Copy placed into JI bin and original sent to scanning.    Margarita Childers CMA (AAMA)  [facility]

## 2024-07-24 NOTE — TELEPHONE ENCOUNTER
Paperwork signed and placed in my outbox    I also signed all the other home health orders that were pending.  They have been placed in my outbox.

## 2024-07-25 ENCOUNTER — ANCILLARY PROCEDURE (OUTPATIENT)
Dept: MAMMOGRAPHY | Facility: CLINIC | Age: 69
End: 2024-07-25
Attending: NURSE PRACTITIONER
Payer: COMMERCIAL

## 2024-07-25 ENCOUNTER — MYC MEDICAL ADVICE (OUTPATIENT)
Dept: FAMILY MEDICINE | Facility: CLINIC | Age: 69
End: 2024-07-25

## 2024-07-25 DIAGNOSIS — Z12.31 ENCOUNTER FOR SCREENING MAMMOGRAM FOR BREAST CANCER: ICD-10-CM

## 2024-07-25 PROCEDURE — 77067 SCR MAMMO BI INCL CAD: CPT | Mod: TC | Performed by: STUDENT IN AN ORGANIZED HEALTH CARE EDUCATION/TRAINING PROGRAM

## 2024-07-25 PROCEDURE — 77063 BREAST TOMOSYNTHESIS BI: CPT | Mod: TC | Performed by: STUDENT IN AN ORGANIZED HEALTH CARE EDUCATION/TRAINING PROGRAM

## 2024-07-25 NOTE — TELEPHONE ENCOUNTER
Patient states she will not take famotidine because it has caused very bad reflux for her in the past.

## 2024-07-26 NOTE — TELEPHONE ENCOUNTER
Looks like this was faxed on 07/24/24      Guilherme Rios Jr., [facility] on 7/26/2024 at 2:37 PM

## 2024-07-31 ENCOUNTER — ANCILLARY PROCEDURE (OUTPATIENT)
Dept: MAMMOGRAPHY | Facility: CLINIC | Age: 69
End: 2024-07-31
Attending: NURSE PRACTITIONER
Payer: COMMERCIAL

## 2024-07-31 DIAGNOSIS — R92.1 BREAST CALCIFICATIONS: ICD-10-CM

## 2024-07-31 PROBLEM — E66.01 MORBID OBESITY (H): Status: RESOLVED | Noted: 2022-01-27 | Resolved: 2024-07-31

## 2024-07-31 PROBLEM — E66.812 CLASS 2 SEVERE OBESITY DUE TO EXCESS CALORIES WITH SERIOUS COMORBIDITY IN ADULT (H): Status: ACTIVE | Noted: 2024-07-31

## 2024-07-31 PROBLEM — M87.9 OSTEONECROSIS OF BOTH HIPS (H): Status: ACTIVE | Noted: 2024-07-31

## 2024-07-31 PROBLEM — E66.811 CLASS 1 OBESITY DUE TO EXCESS CALORIES WITH SERIOUS COMORBIDITY AND BODY MASS INDEX (BMI) OF 32.0 TO 32.9 IN ADULT: Status: ACTIVE | Noted: 2024-07-31

## 2024-07-31 PROBLEM — R26.9 IMPAIRED GAIT: Status: ACTIVE | Noted: 2024-07-31

## 2024-07-31 PROBLEM — Z90.710 H/O TOTAL HYSTERECTOMY: Status: ACTIVE | Noted: 2024-07-31

## 2024-07-31 PROBLEM — I87.8 VENOUS STASIS: Status: ACTIVE | Noted: 2024-07-31

## 2024-07-31 PROBLEM — E66.01 CLASS 2 SEVERE OBESITY DUE TO EXCESS CALORIES WITH SERIOUS COMORBIDITY IN ADULT (H): Status: ACTIVE | Noted: 2024-07-31

## 2024-07-31 PROBLEM — Z79.899 CURRENT USE OF PROTON PUMP INHIBITOR: Status: ACTIVE | Noted: 2024-07-31

## 2024-07-31 PROBLEM — E66.09 CLASS 1 OBESITY DUE TO EXCESS CALORIES WITH SERIOUS COMORBIDITY AND BODY MASS INDEX (BMI) OF 32.0 TO 32.9 IN ADULT: Status: ACTIVE | Noted: 2024-07-31

## 2024-07-31 PROCEDURE — 77065 DX MAMMO INCL CAD UNI: CPT | Mod: RT

## 2024-08-02 ENCOUNTER — LAB (OUTPATIENT)
Dept: LAB | Facility: CLINIC | Age: 69
End: 2024-08-02
Payer: COMMERCIAL

## 2024-08-02 DIAGNOSIS — E87.1 HYPONATREMIA: ICD-10-CM

## 2024-08-02 LAB
ANION GAP SERPL CALCULATED.3IONS-SCNC: 10 MMOL/L (ref 7–15)
BUN SERPL-MCNC: 22.4 MG/DL (ref 8–23)
CALCIUM SERPL-MCNC: 9.5 MG/DL (ref 8.8–10.4)
CHLORIDE SERPL-SCNC: 96 MMOL/L (ref 98–107)
CREAT SERPL-MCNC: 0.84 MG/DL (ref 0.51–0.95)
EGFRCR SERPLBLD CKD-EPI 2021: 74 ML/MIN/1.73M2
GLUCOSE SERPL-MCNC: 128 MG/DL (ref 70–99)
HCO3 SERPL-SCNC: 24 MMOL/L (ref 22–29)
OSMOLALITY SERPL: 286 MMOL/KG (ref 280–301)
OSMOLALITY UR: 232 MMOL/KG (ref 100–1200)
POTASSIUM SERPL-SCNC: 4.5 MMOL/L (ref 3.4–5.3)
SODIUM SERPL-SCNC: 130 MMOL/L (ref 135–145)
SODIUM UR-SCNC: 37 MMOL/L

## 2024-08-02 PROCEDURE — 83935 ASSAY OF URINE OSMOLALITY: CPT

## 2024-08-02 PROCEDURE — 84300 ASSAY OF URINE SODIUM: CPT

## 2024-08-02 PROCEDURE — 83930 ASSAY OF BLOOD OSMOLALITY: CPT

## 2024-08-02 PROCEDURE — 36415 COLL VENOUS BLD VENIPUNCTURE: CPT

## 2024-08-02 PROCEDURE — 80048 BASIC METABOLIC PNL TOTAL CA: CPT

## 2024-09-18 ENCOUNTER — E-VISIT (OUTPATIENT)
Dept: FAMILY MEDICINE | Facility: CLINIC | Age: 69
End: 2024-09-18
Payer: COMMERCIAL

## 2024-09-18 DIAGNOSIS — N30.90 CYSTITIS: Primary | ICD-10-CM

## 2024-09-18 PROCEDURE — 99421 OL DIG E/M SVC 5-10 MIN: CPT | Performed by: STUDENT IN AN ORGANIZED HEALTH CARE EDUCATION/TRAINING PROGRAM

## 2024-09-18 RX ORDER — CEPHALEXIN 500 MG/1
500 CAPSULE ORAL 2 TIMES DAILY
Qty: 6 CAPSULE | Refills: 0 | Status: SHIPPED | OUTPATIENT
Start: 2024-09-18

## 2024-10-17 DIAGNOSIS — E78.5 HYPERLIPIDEMIA, UNSPECIFIED HYPERLIPIDEMIA TYPE: ICD-10-CM

## 2024-10-17 RX ORDER — ATORVASTATIN CALCIUM 40 MG/1
40 TABLET, FILM COATED ORAL DAILY
Qty: 100 TABLET | Refills: 1 | Status: SHIPPED | OUTPATIENT
Start: 2024-10-17

## 2024-10-17 NOTE — TELEPHONE ENCOUNTER
Patient has [facility] coverage and is eligible to get certain prescriptions as a 100-day supply.      Prescriptions updated to 100-day supply: atorvastatin     Saroj SchererD, [facility]  Retail Pharmacy Specialist  [phone]

## 2024-10-18 DIAGNOSIS — K21.9 GASTROESOPHAGEAL REFLUX DISEASE WITHOUT ESOPHAGITIS: ICD-10-CM

## 2024-10-18 NOTE — TELEPHONE ENCOUNTER
Pharmacy calling to get a medication refill on medications attached     Disp Refills Start End AMI   omeprazole (PRILOSEC) 20 MG DR capsule 90 capsule 0 7/22/2024 -- No   Sig: TAKE 1 CAPSULE (20 MG TOTAL) BY MOUTH DAILY BEFORE BREAKFAST.   Sent to pharmacy as: Omeprazole 20 MG Oral Capsule Delayed Release (PriLOSEC)   Class: E-Prescribe   Order: [id number]   E-Prescribing Status: Receipt confirmed by pharmacy (7/22/2024  1:49 PM CDT)

## 2024-10-31 DIAGNOSIS — E83.42 HYPOMAGNESEMIA: ICD-10-CM

## 2024-10-31 RX ORDER — MAGNESIUM OXIDE 400 MG/1
400 TABLET ORAL DAILY
Qty: 90 TABLET | Refills: 0 | Status: SHIPPED | OUTPATIENT
Start: 2024-10-31

## 2024-10-31 NOTE — TELEPHONE ENCOUNTER
FAX Prescription Refill Request    MAC-OXIDE 400 MG TABLETS    Last Visit with PCP: Wade 09/18/24  Next Visit with PCP: nothing scheduled

## 2024-12-08 ENCOUNTER — HEALTH MAINTENANCE LETTER (OUTPATIENT)
Age: 69
End: 2024-12-08

## 2024-12-26 ENCOUNTER — PATIENT OUTREACH (OUTPATIENT)
Dept: CARE COORDINATION | Facility: CLINIC | Age: 69
End: 2024-12-26
Payer: COMMERCIAL

## 2024-12-30 DIAGNOSIS — I10 BENIGN ESSENTIAL HYPERTENSION: ICD-10-CM

## 2024-12-30 NOTE — TELEPHONE ENCOUNTER
FAX [facility] Prescription Refill Request     LISINOPRIL 20 mg tablets    Last Visit with PCP:  09/18/2024  Next Visit with PCP: 07/29/25

## 2024-12-31 RX ORDER — LISINOPRIL 20 MG/1
TABLET ORAL
Qty: 90 TABLET | Refills: 0 | Status: SHIPPED | OUTPATIENT
Start: 2024-12-31

## 2025-03-26 DIAGNOSIS — I10 BENIGN ESSENTIAL HYPERTENSION: ICD-10-CM

## 2025-03-26 RX ORDER — LISINOPRIL 20 MG/1
20 TABLET ORAL
Qty: 90 TABLET | Refills: 0 | Status: SHIPPED | OUTPATIENT
Start: 2025-03-26

## 2025-03-27 ENCOUNTER — MYC MEDICAL ADVICE (OUTPATIENT)
Dept: FAMILY MEDICINE | Facility: CLINIC | Age: 70
End: 2025-03-27
Payer: COMMERCIAL

## 2025-03-27 DIAGNOSIS — R30.0 DYSURIA: Primary | ICD-10-CM

## 2025-03-27 NOTE — TELEPHONE ENCOUNTER
Sent [employer] message to Gabi Sandhu in regards to their atorvastatin being overdue for refill. Per our records last filled 10/17/24 for 90 day supply.     Treasure Kelly, PharmD, BCACP  Population Health Pharmacist  [phone]

## 2025-03-28 RX ORDER — CEPHALEXIN 500 MG/1
500 CAPSULE ORAL 2 TIMES DAILY
Qty: 6 CAPSULE | Refills: 0 | Status: SHIPPED | OUTPATIENT
Start: 2025-03-28

## 2025-03-28 NOTE — TELEPHONE ENCOUNTER
I cannot see the results of what she sent me on the pictures.  Will treat based on symptoms as him not in the office next week.  Keflex ordered    If no improvement with this, she will need to be seen

## 2025-04-03 ENCOUNTER — TELEPHONE (OUTPATIENT)
Dept: FAMILY MEDICINE | Facility: CLINIC | Age: 70
End: 2025-04-03
Payer: COMMERCIAL

## 2025-04-03 NOTE — TELEPHONE ENCOUNTER
Velia from [facility] calling to confirm if patient is on a statin. Advised she is currently taking atorvastatin 40 mg daily.

## 2025-06-29 ENCOUNTER — HEALTH MAINTENANCE LETTER (OUTPATIENT)
Age: 70
End: 2025-06-29

## 2025-07-03 DIAGNOSIS — I10 BENIGN ESSENTIAL HYPERTENSION: ICD-10-CM

## 2025-07-03 RX ORDER — LISINOPRIL 20 MG/1
20 TABLET ORAL DAILY
Qty: 90 TABLET | Refills: 0 | Status: SHIPPED | OUTPATIENT
Start: 2025-07-03

## 2025-07-17 ENCOUNTER — TELEPHONE (OUTPATIENT)
Dept: FAMILY MEDICINE | Facility: CLINIC | Age: 70
End: 2025-07-17
Payer: COMMERCIAL

## 2025-07-17 NOTE — TELEPHONE ENCOUNTER
Patient Quality Outreach    Patient is due for the following:   Diabetes -  Diabetic Follow-Up Visit      AWV - AWV is scheduled 09/16/2025 with Natalia Kenny NP    Action(s) Taken:   If patient calls back, schedule a office visit for Diabetes Management Follow Up     Type of outreach:    Sent bVisual message.    Questions for provider review:    None         Sheila Roy  Chart routed to None.

## 2025-07-31 ENCOUNTER — E-VISIT (OUTPATIENT)
Dept: FAMILY MEDICINE | Facility: CLINIC | Age: 70
End: 2025-07-31
Payer: COMMERCIAL

## 2025-07-31 DIAGNOSIS — R30.0 DYSURIA: Primary | ICD-10-CM
